# Patient Record
Sex: MALE | Race: OTHER | NOT HISPANIC OR LATINO | URBAN - METROPOLITAN AREA
[De-identification: names, ages, dates, MRNs, and addresses within clinical notes are randomized per-mention and may not be internally consistent; named-entity substitution may affect disease eponyms.]

---

## 2019-04-16 RX ORDER — CHLORHEXIDINE GLUCONATE 213 G/1000ML
1 SOLUTION TOPICAL ONCE
Qty: 0 | Refills: 0 | Status: DISCONTINUED | OUTPATIENT
Start: 2019-04-17 | End: 2019-05-02

## 2019-04-16 NOTE — H&P ADULT - HISTORY OF PRESENT ILLNESS
SDA Skeleton  *Confirm Medications on Arrival    50 y/o M with PMH of   who presented to cardiologist Dr. Zhong c/o…  Pt denies … CP, SOB, dizziness, diaphoresis, palpitations, fatigue, LE edema, orthopnea, PND, syncope.  Pt. had STRESS ECHO 4/15/19 which was terminated 2/2 exertional chest pain. Pt. only achieved 72 % of maximum predicted target HR which renders the stress load achieved inconclusive/non-diagnostic for illustration of possible presence of myocardial ischemia. Baseline ECHO revealed normal LV size; wall motion; systolic function with LVEF 60-65%.   Due to pts risk factors, CCS Angina Class _ Sx, abnormal STRESS ECHO , pt is referred for cardiac catheterization w/ possible intervention.

## 2019-04-17 ENCOUNTER — OUTPATIENT (OUTPATIENT)
Dept: OUTPATIENT SERVICES | Facility: HOSPITAL | Age: 49
LOS: 1 days | Discharge: HOME | End: 2019-04-17
Payer: COMMERCIAL

## 2019-04-17 ENCOUNTER — INPATIENT (INPATIENT)
Facility: HOSPITAL | Age: 49
LOS: 9 days | Discharge: HOME HEALTH | DRG: 233 | End: 2019-04-27
Attending: THORACIC SURGERY (CARDIOTHORACIC VASCULAR SURGERY) | Admitting: THORACIC SURGERY (CARDIOTHORACIC VASCULAR SURGERY)
Payer: COMMERCIAL

## 2019-04-17 VITALS
HEIGHT: 67 IN | TEMPERATURE: 98 F | OXYGEN SATURATION: 98 % | SYSTOLIC BLOOD PRESSURE: 118 MMHG | DIASTOLIC BLOOD PRESSURE: 76 MMHG | WEIGHT: 154.32 LBS | HEART RATE: 69 BPM | RESPIRATION RATE: 19 BRPM

## 2019-04-17 DIAGNOSIS — Z98.890 OTHER SPECIFIED POSTPROCEDURAL STATES: Chronic | ICD-10-CM

## 2019-04-17 DIAGNOSIS — I10 ESSENTIAL (PRIMARY) HYPERTENSION: ICD-10-CM

## 2019-04-17 DIAGNOSIS — D72.829 ELEVATED WHITE BLOOD CELL COUNT, UNSPECIFIED: ICD-10-CM

## 2019-04-17 DIAGNOSIS — E78.5 HYPERLIPIDEMIA, UNSPECIFIED: ICD-10-CM

## 2019-04-17 DIAGNOSIS — I20.0 UNSTABLE ANGINA: ICD-10-CM

## 2019-04-17 DIAGNOSIS — Z82.49 FAMILY HISTORY OF ISCHEMIC HEART DISEASE AND OTHER DISEASES OF THE CIRCULATORY SYSTEM: ICD-10-CM

## 2019-04-17 DIAGNOSIS — I25.110 ATHEROSCLEROTIC HEART DISEASE OF NATIVE CORONARY ARTERY WITH UNSTABLE ANGINA PECTORIS: ICD-10-CM

## 2019-04-17 PROBLEM — Z00.00 ENCOUNTER FOR PREVENTIVE HEALTH EXAMINATION: Status: ACTIVE | Noted: 2019-04-17

## 2019-04-17 LAB
ALBUMIN SERPL ELPH-MCNC: 4 G/DL — SIGNIFICANT CHANGE UP (ref 3.3–5)
ALP SERPL-CCNC: 88 U/L — SIGNIFICANT CHANGE UP (ref 40–120)
ALT FLD-CCNC: 17 U/L — SIGNIFICANT CHANGE UP (ref 10–45)
ANION GAP SERPL CALC-SCNC: 11 MMOL/L — SIGNIFICANT CHANGE UP (ref 5–17)
APTT BLD: 38.2 SEC — HIGH (ref 27.5–36.3)
AST SERPL-CCNC: 15 U/L — SIGNIFICANT CHANGE UP (ref 10–40)
BASOPHILS # BLD AUTO: 0.05 K/UL — SIGNIFICANT CHANGE UP (ref 0–0.2)
BASOPHILS NFR BLD AUTO: 0.5 % — SIGNIFICANT CHANGE UP (ref 0–2)
BILIRUB SERPL-MCNC: 0.2 MG/DL — SIGNIFICANT CHANGE UP (ref 0.2–1.2)
BUN SERPL-MCNC: 17 MG/DL — SIGNIFICANT CHANGE UP (ref 7–23)
CALCIUM SERPL-MCNC: 9.2 MG/DL — SIGNIFICANT CHANGE UP (ref 8.4–10.5)
CHLORIDE SERPL-SCNC: 104 MMOL/L — SIGNIFICANT CHANGE UP (ref 96–108)
CO2 SERPL-SCNC: 25 MMOL/L — SIGNIFICANT CHANGE UP (ref 22–31)
CREAT SERPL-MCNC: 0.91 MG/DL — SIGNIFICANT CHANGE UP (ref 0.5–1.3)
EOSINOPHIL # BLD AUTO: 0.29 K/UL — SIGNIFICANT CHANGE UP (ref 0–0.5)
EOSINOPHIL NFR BLD AUTO: 2.6 % — SIGNIFICANT CHANGE UP (ref 0–6)
GLUCOSE SERPL-MCNC: 99 MG/DL — SIGNIFICANT CHANGE UP (ref 70–99)
HCT VFR BLD CALC: 46.1 % — SIGNIFICANT CHANGE UP (ref 39–50)
HGB BLD-MCNC: 14.5 G/DL — SIGNIFICANT CHANGE UP (ref 13–17)
IMM GRANULOCYTES NFR BLD AUTO: 0.4 % — SIGNIFICANT CHANGE UP (ref 0–1.5)
INR BLD: 1.09 — SIGNIFICANT CHANGE UP (ref 0.88–1.16)
LYMPHOCYTES # BLD AUTO: 4.46 K/UL — HIGH (ref 1–3.3)
LYMPHOCYTES # BLD AUTO: 40.7 % — SIGNIFICANT CHANGE UP (ref 13–44)
MCHC RBC-ENTMCNC: 27.5 PG — SIGNIFICANT CHANGE UP (ref 27–34)
MCHC RBC-ENTMCNC: 31.5 GM/DL — LOW (ref 32–36)
MCV RBC AUTO: 87.5 FL — SIGNIFICANT CHANGE UP (ref 80–100)
MONOCYTES # BLD AUTO: 1.12 K/UL — HIGH (ref 0–0.9)
MONOCYTES NFR BLD AUTO: 10.2 % — SIGNIFICANT CHANGE UP (ref 2–14)
NEUTROPHILS # BLD AUTO: 4.99 K/UL — SIGNIFICANT CHANGE UP (ref 1.8–7.4)
NEUTROPHILS NFR BLD AUTO: 45.6 % — SIGNIFICANT CHANGE UP (ref 43–77)
NRBC # BLD: 0 /100 WBCS — SIGNIFICANT CHANGE UP (ref 0–0)
PLATELET # BLD AUTO: 372 K/UL — SIGNIFICANT CHANGE UP (ref 150–400)
POTASSIUM SERPL-MCNC: 4 MMOL/L — SIGNIFICANT CHANGE UP (ref 3.5–5.3)
POTASSIUM SERPL-SCNC: 4 MMOL/L — SIGNIFICANT CHANGE UP (ref 3.5–5.3)
PROT SERPL-MCNC: 7.6 G/DL — SIGNIFICANT CHANGE UP (ref 6–8.3)
PROTHROM AB SERPL-ACNC: 12.4 SEC — SIGNIFICANT CHANGE UP (ref 10–12.9)
RBC # BLD: 5.27 M/UL — SIGNIFICANT CHANGE UP (ref 4.2–5.8)
RBC # FLD: 15.2 % — HIGH (ref 10.3–14.5)
SODIUM SERPL-SCNC: 140 MMOL/L — SIGNIFICANT CHANGE UP (ref 135–145)
TROPONIN T SERPL-MCNC: <0.01 NG/ML — SIGNIFICANT CHANGE UP (ref 0–0.01)
WBC # BLD: 10.95 K/UL — HIGH (ref 3.8–10.5)
WBC # FLD AUTO: 10.95 K/UL — HIGH (ref 3.8–10.5)

## 2019-04-17 PROCEDURE — 99223 1ST HOSP IP/OBS HIGH 75: CPT

## 2019-04-17 PROCEDURE — 94010 BREATHING CAPACITY TEST: CPT | Mod: 26

## 2019-04-17 PROCEDURE — 71045 X-RAY EXAM CHEST 1 VIEW: CPT | Mod: 26

## 2019-04-17 PROCEDURE — 93880 EXTRACRANIAL BILAT STUDY: CPT | Mod: 26

## 2019-04-17 PROCEDURE — 99285 EMERGENCY DEPT VISIT HI MDM: CPT

## 2019-04-17 PROCEDURE — 93458 L HRT ARTERY/VENTRICLE ANGIO: CPT

## 2019-04-17 RX ORDER — ACETAMINOPHEN 500 MG
1000 TABLET ORAL ONCE
Qty: 0 | Refills: 0 | Status: COMPLETED | OUTPATIENT
Start: 2019-04-17 | End: 2019-04-17

## 2019-04-17 RX ORDER — ATORVASTATIN CALCIUM 80 MG/1
80 TABLET, FILM COATED ORAL AT BEDTIME
Qty: 0 | Refills: 0 | Status: DISCONTINUED | OUTPATIENT
Start: 2019-04-17 | End: 2019-04-19

## 2019-04-17 RX ORDER — HEPARIN SODIUM 5000 [USP'U]/ML
10 INJECTION INTRAVENOUS; SUBCUTANEOUS
Qty: 25000 | Refills: 0 | Status: DISCONTINUED | OUTPATIENT
Start: 2019-04-17 | End: 2019-04-17

## 2019-04-17 RX ORDER — ISOSORBIDE MONONITRATE 60 MG/1
30 TABLET, EXTENDED RELEASE ORAL DAILY
Qty: 0 | Refills: 0 | Status: DISCONTINUED | OUTPATIENT
Start: 2019-04-17 | End: 2019-04-19

## 2019-04-17 RX ORDER — ASPIRIN/CALCIUM CARB/MAGNESIUM 324 MG
325 TABLET ORAL ONCE
Qty: 0 | Refills: 0 | Status: COMPLETED | OUTPATIENT
Start: 2019-04-17 | End: 2019-04-17

## 2019-04-17 RX ORDER — MORPHINE SULFATE 50 MG/1
4 CAPSULE, EXTENDED RELEASE ORAL ONCE
Qty: 0 | Refills: 0 | Status: DISCONTINUED | OUTPATIENT
Start: 2019-04-17 | End: 2019-04-17

## 2019-04-17 RX ORDER — NICOTINE POLACRILEX 2 MG
1 GUM BUCCAL DAILY
Qty: 0 | Refills: 0 | Status: DISCONTINUED | OUTPATIENT
Start: 2019-04-17 | End: 2019-04-19

## 2019-04-17 RX ORDER — ATORVASTATIN CALCIUM 80 MG/1
40 TABLET, FILM COATED ORAL AT BEDTIME
Qty: 0 | Refills: 0 | Status: DISCONTINUED | OUTPATIENT
Start: 2019-04-17 | End: 2019-04-17

## 2019-04-17 RX ORDER — METOPROLOL TARTRATE 50 MG
12.5 TABLET ORAL EVERY 12 HOURS
Qty: 0 | Refills: 0 | Status: DISCONTINUED | OUTPATIENT
Start: 2019-04-17 | End: 2019-04-17

## 2019-04-17 RX ORDER — HEPARIN SODIUM 5000 [USP'U]/ML
1000 INJECTION INTRAVENOUS; SUBCUTANEOUS
Qty: 25000 | Refills: 0 | Status: DISCONTINUED | OUTPATIENT
Start: 2019-04-18 | End: 2019-04-19

## 2019-04-17 RX ORDER — ASPIRIN/CALCIUM CARB/MAGNESIUM 324 MG
81 TABLET ORAL DAILY
Qty: 0 | Refills: 0 | Status: DISCONTINUED | OUTPATIENT
Start: 2019-04-18 | End: 2019-04-19

## 2019-04-17 RX ORDER — SODIUM CHLORIDE 9 MG/ML
500 INJECTION INTRAMUSCULAR; INTRAVENOUS; SUBCUTANEOUS
Qty: 0 | Refills: 0 | Status: DISCONTINUED | OUTPATIENT
Start: 2019-04-17 | End: 2019-04-19

## 2019-04-17 RX ORDER — PANTOPRAZOLE SODIUM 20 MG/1
40 TABLET, DELAYED RELEASE ORAL
Qty: 0 | Refills: 0 | Status: DISCONTINUED | OUTPATIENT
Start: 2019-04-17 | End: 2019-04-19

## 2019-04-17 RX ORDER — SODIUM CHLORIDE 9 MG/ML
500 INJECTION INTRAMUSCULAR; INTRAVENOUS; SUBCUTANEOUS
Qty: 0 | Refills: 0 | Status: DISCONTINUED | OUTPATIENT
Start: 2019-04-17 | End: 2019-04-17

## 2019-04-17 RX ORDER — NITROGLYCERIN 6.5 MG
0.4 CAPSULE, EXTENDED RELEASE ORAL
Qty: 0 | Refills: 0 | Status: DISCONTINUED | OUTPATIENT
Start: 2019-04-17 | End: 2019-04-17

## 2019-04-17 RX ORDER — MORPHINE SULFATE 50 MG/1
2 CAPSULE, EXTENDED RELEASE ORAL EVERY 4 HOURS
Qty: 0 | Refills: 0 | Status: DISCONTINUED | OUTPATIENT
Start: 2019-04-17 | End: 2019-04-19

## 2019-04-17 RX ORDER — CLOPIDOGREL BISULFATE 75 MG/1
600 TABLET, FILM COATED ORAL ONCE
Qty: 0 | Refills: 0 | Status: COMPLETED | OUTPATIENT
Start: 2019-04-17 | End: 2019-04-17

## 2019-04-17 RX ORDER — NITROGLYCERIN 6.5 MG
0.4 CAPSULE, EXTENDED RELEASE ORAL
Qty: 0 | Refills: 0 | Status: DISCONTINUED | OUTPATIENT
Start: 2019-04-17 | End: 2019-04-19

## 2019-04-17 RX ORDER — HEPARIN SODIUM 5000 [USP'U]/ML
1000 INJECTION INTRAVENOUS; SUBCUTANEOUS
Qty: 25000 | Refills: 0 | Status: DISCONTINUED | OUTPATIENT
Start: 2019-04-17 | End: 2019-04-17

## 2019-04-17 RX ORDER — METOPROLOL TARTRATE 50 MG
12.5 TABLET ORAL EVERY 12 HOURS
Qty: 0 | Refills: 0 | Status: DISCONTINUED | OUTPATIENT
Start: 2019-04-17 | End: 2019-04-19

## 2019-04-17 RX ADMIN — ATORVASTATIN CALCIUM 80 MILLIGRAM(S): 80 TABLET, FILM COATED ORAL at 23:07

## 2019-04-17 RX ADMIN — CLOPIDOGREL BISULFATE 600 MILLIGRAM(S): 75 TABLET, FILM COATED ORAL at 08:50

## 2019-04-17 RX ADMIN — Medication 400 MILLIGRAM(S): at 16:02

## 2019-04-17 RX ADMIN — Medication 0.4 MILLIGRAM(S): at 08:51

## 2019-04-17 RX ADMIN — MORPHINE SULFATE 4 MILLIGRAM(S): 50 CAPSULE, EXTENDED RELEASE ORAL at 10:08

## 2019-04-17 RX ADMIN — Medication 325 MILLIGRAM(S): at 08:50

## 2019-04-17 NOTE — H&P ADULT - NSHPLABSRESULTS_GEN_ALL_CORE
14.5   10.95 )-----------( 372      ( 17 Apr 2019 08:30 )             46.1       04-17    140  |  104  |  17  ----------------------------<  99  4.0   |  25  |  0.91    Ca    9.2      17 Apr 2019 08:30    TPro  7.6  /  Alb  4.0  /  TBili  0.2  /  DBili  x   /  AST  15  /  ALT  17  /  AlkPhos  88  04-17      PT/INR - ( 17 Apr 2019 08:30 )   PT: 12.4 sec;   INR: 1.09          PTT - ( 17 Apr 2019 08:30 )  PTT:38.2 sec    CARDIAC MARKERS ( 17 Apr 2019 08:30 )  x     / <0.01 ng/mL / x     / x     / x

## 2019-04-17 NOTE — H&P ADULT - NSHPPHYSICALEXAM_GEN_ALL_CORE
V/S 	BP: [id number] 	  HR: 60s     RR: 16   T: 97   O2: 98% RA   	  GEN: NAD  PULM:  CTA B/L, No Crackles, Wheezes or Rhonchi  CARD: No JVD B/L, RRR, S1 and S2   ABD: +BS, NT, soft/ND	  EXT: No Edema B/L LE  NEURO: A+Ox3, CN 2-12 intact, motor strength 5/5 in B/L UE and LE, sensation intact in all extremities   PULSES: 2+ Radial b/l, 1-2+ Femoral b/l (no bruit b/l), DP 2+ b/l, PT 1-2+ B/L  ASA III, Mallampati III
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
## 2019-04-17 NOTE — ED ADULT TRIAGE NOTE - CHIEF COMPLAINT QUOTE
Pt states per translation line ID #[id number] "he was scheduled for a cardiac catheterization this morning but his insurance didn't approve it. He has Chest Pain right now". RN in cath lab confirmed pt statement

## 2019-04-17 NOTE — ED ADULT NURSE NOTE - OBJECTIVE STATEMENT
50 y/o male presents to the ED c/o 10/10 midsternal CP that began this AM associated w/ dizziness. Pt was scheduled for cardiac cath today for +stress test. Denies SOB, headache, back pain, nausea, vomiting, and any other sx.

## 2019-04-17 NOTE — H&P ADULT - PROBLEM SELECTOR PLAN 2
Afebrile. No infiltrate on CXR per verbal radiology read. No infectious symptoms.  DVT PPX: Intermittent Pneumatic Compression B/L LE     Case discussed w/ Dr. Zhong    Dispo: for cardiac cath. Afebrile. No infiltrate on CXR per verbal radiology read. No infectious symptoms.    DVT PPX: Intermittent Pneumatic Compression B/L LE     Case discussed w/ Dr. Zhong    Dispo: for cardiac cath.

## 2019-04-17 NOTE — ED PROVIDER NOTE - CARDIOVASCULAR [+], MLM
Controlled Substance Refill Request for Norco 5-325mg    Last refill: 12/1/17 - 60qty    Last clinic visit: 12/21/17     Next appt: None with [facility] provider    Controlled substance agreement on file: No.    Documentation in problem list reviewed:  Yes    Processing:  Mail to pended pharmacy   CHEST PAIN
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
## 2019-04-17 NOTE — ED PROVIDER NOTE - CLINICAL SUMMARY MEDICAL DECISION MAKING FREE TEXT BOX
48 yo M, unstable angina, given aspirin, plavix, nitro in the ED. Patient discussed with Dr. Zhong. Admit to cardiology prior to labs returning for cath.

## 2019-04-17 NOTE — ED ADULT NURSE NOTE - CHIEF COMPLAINT QUOTE
Pt states per translation line ID #[id number] "he was scheduled for a cardiac catheterization this morning but his insurance didn't approve it. He has Chest Pain right now". RN in cath lab confirmed pt statement

## 2019-04-17 NOTE — H&P ADULT - PROBLEM SELECTOR PLAN 1
ASA/Plavix load given in ED  Consented for cath.  Plan for cath. now  Risks & benefits of procedure and alternative therapy have been explained to the patient including but not limited to: allergic reaction, bleeding w/possible need for blood transfusion, infection, renal and vascular compromise, limb damage, arrhythmia, stroke, vessel dissection/perforation, Myocardial infarction, emergent CABG. Informed consent obtained and in chart.     TSH/A1C/Lipid Panel  NS @ 50cc/hr pre-cath  BP similar in B/L UE. R /82, L 126/78. No radial-femoral pulse delay B/L.

## 2019-04-17 NOTE — H&P ADULT - HISTORY OF PRESENT ILLNESS
History obtained from pt via translation of [employer]   ID [id number] and ID [id number].   50 y/o Portuguese Speaking M (who came from [place] 1 month ago, now living in [place]), On No Current Meds at Home, w/ FMHX CAD/MI, Ex-Marijuana User (Quit 15 yrs ago) w/ PMHX Prior Diagnostic Cardiac Cath. / presumed Non-Obstructive CAD in [place], who reports C/P x 3 years, known to Dr. Zhong w/ recent abnormal Stress Echo 4/15/19 revealing, terminated due to C/P, 72 % of maximum predicted target HR which renders the stress load achieved inconclusive/non-diagnostic for illustration of possible presence of myocardial ischemia LVEF 60-65%, who presented to [facility] ED today c/o progressive 10/10 L sided "sharp" C/P independent of rest/activity, w/ prior radiation to the back (not radiating today). Associated symptoms inclide SOB, dizziness, diaphoresis, nausea, fatigue / general weakness. Pt denies vomiting, orthopnea/PND, LE edema, syncope. In ED /76, HR 60s, RR 16, T 97, O2 98% RA. Labs significant for Troponin negative, WBC 10.9. EKG showed NSR @ 73bpm, w/ incomplete RBBB, j point elevation in V2-V4, I, AVL, TWI and ST depression up to 0.5mmg in II, III, AVF. ASA 325mg and Plavix 600mg given. Pt received SL NTG x 3 without relief. C/P noted to be worse with palpation of chest wall. Repeat EKG done, both EKGs reviewed with Dr. Zhong, additional j point elevation noted in V1, otherwise no change. Pt then received Morphine 4mg IV, currently comfortable at rest and on ambulation with no reported symptoms. CXR clear per verbal radiology read. Denies fever/chills, cough, abdominal pain, and diarrhea.    Due to pts risk factors, unable angina and non-conclusive stress echo pt is referred for cardiac catheterization w/ possible intervention History obtained from pt via translation of [employer]   ID [id number] and ID [id number].   48 y/o Spanish Speaking M (who came from [place] 1 month ago, now living in [place]), On No Current Meds at Home, w/ FMHX CAD/MI, Ex-Marijuana User (Quit 15 yrs ago) w/ PMHX Prior Diagnostic Cardiac Cath. / presumed Non-Obstructive CAD in [place], who reports C/P x 3 years, known to Dr. Zhong w/ recent abnormal Stress Echo 4/15/19 revealing, terminated due to C/P, 72 % of maximum predicted target HR which renders the stress load achieved inconclusive/non-diagnostic for illustration of possible presence of myocardial ischemia LVEF 60-65%, patient was initially scheduled for ambulatory cath today however due to unstable angina presented to [facility] ED today. Pt c/o progressive 10/10 L sided "sharp" C/P independent of rest/activity, w/ prior radiation to the back (not radiating today). Associated symptoms include SOB, dizziness, diaphoresis, nausea, fatigue / general weakness. Pt denies vomiting, orthopnea/PND, LE edema, syncope. In ED /76, HR 60s, RR 16, T 97, O2 98% RA. Labs significant for Troponin negative, WBC 10.9. EKG showed NSR @ 73bpm, w/ incomplete RBBB, j point elevation in V2-V4, I, AVL, TWI and ST depression up to 0.5mmg in II, III, AVF. ASA 325mg and Plavix 600mg given. Pt received SL NTG x 3 without relief. C/P noted to be worse with palpation of chest wall. Repeat EKG done, both EKGs reviewed with Dr. Zhong, additional j point elevation noted in V1, otherwise no change. Pt then received Morphine 4mg IV, currently comfortable at rest and on ambulation with no reported symptoms. CXR clear per verbal radiology read. Denies fever/chills, cough, abdominal pain, and diarrhea.    Due to pts risk factors, unable angina and non-conclusive stress echo pt is referred for cardiac catheterization w/ possible intervention

## 2019-04-17 NOTE — ED PROVIDER NOTE - OBJECTIVE STATEMENT
50 yo M, h/o tobacco use, sent in by cardiology for cath. Patient with positive stress test, supposed to have cath scheduled but issues scheduling it. Patient with 10/10 chest pain, burning, substernal. Discussed with Dr. Zhong, patients cardiologist who would like the patient admitted for cardiac cath today.

## 2019-04-17 NOTE — H&P ADULT - ASSESSMENT
50 y/o Kiswahili Speaking M, w/ FMHX CAD/MI, Ex-Marijuana User, w/ PMHX Prior Diagnostic Cardiac Cath. / presumed Non-Obstructive CAD in [place], who presents w/ unstable angina.   Due to pts risk factors, unable angina and non-conclusive stress echo pt is referred for cardiac catheterization w/ possible intervention

## 2019-04-17 NOTE — CHART NOTE - NSCHARTNOTEFT_GEN_A_CORE
Currently VSS. No reported symptoms. R radial access site stable, radial band in place, no hematoma/bleeding, 1+ radial pulse.   s/p cardiac cath 4/17/19: severe 4vCAD, 100% RCA, 95% Ramus, 80% pLCX, 100% dLCX (thrombotic), 75% pLAD, 80% D1, EF 45%, EDP 22  CTS consult (Dr. Goldberg). Pre-op orders.  Started ASA 81mg daily, Lipitor 40 QHS, Lopressor 12.5 BID  No Heparin gtt at this time as per Dr. Zhong post cath., currently C/P free.   Case discussed w/ Dr. Zhong Currently VSS. No reported symptoms. R radial access site stable, radial band in place, no hematoma/bleeding, 1+ radial pulse.   s/p cardiac cath 4/17/19: severe 4vCAD, 100% RCA, 95% Ramus, 80% pLCX, 100% dLCX (thrombotic), 75% pLAD, 80% D1, EF 45%, EDP 22  CTS consult (Dr. Goldberg). Pre-op orders.  Started ASA 81mg daily, Lipitor 80 QHS, Lopressor 12.5 BID  No Heparin gtt at this time as per Dr. Zhong post cath., currently C/P free.   Case discussed w/ Dr. Zhong

## 2019-04-18 LAB
ALBUMIN SERPL ELPH-MCNC: 4.1 G/DL — SIGNIFICANT CHANGE UP (ref 3.3–5)
ALP SERPL-CCNC: 87 U/L — SIGNIFICANT CHANGE UP (ref 40–120)
ALT FLD-CCNC: 17 U/L — SIGNIFICANT CHANGE UP (ref 10–45)
ANION GAP SERPL CALC-SCNC: 12 MMOL/L — SIGNIFICANT CHANGE UP (ref 5–17)
APPEARANCE UR: ABNORMAL
APTT BLD: 35.9 SEC — SIGNIFICANT CHANGE UP (ref 27.5–36.3)
APTT BLD: 40.8 SEC — HIGH (ref 27.5–36.3)
APTT BLD: 47.2 SEC — HIGH (ref 27.5–36.3)
AST SERPL-CCNC: 16 U/L — SIGNIFICANT CHANGE UP (ref 10–40)
BASE EXCESS BLDA CALC-SCNC: -0.4 MMOL/L — SIGNIFICANT CHANGE UP (ref -2–3)
BILIRUB SERPL-MCNC: 0.3 MG/DL — SIGNIFICANT CHANGE UP (ref 0.2–1.2)
BILIRUB UR-MCNC: NEGATIVE — SIGNIFICANT CHANGE UP
BLD GP AB SCN SERPL QL: NEGATIVE — SIGNIFICANT CHANGE UP
BLD GP AB SCN SERPL QL: NEGATIVE — SIGNIFICANT CHANGE UP
BUN SERPL-MCNC: 13 MG/DL — SIGNIFICANT CHANGE UP (ref 7–23)
CALCIUM SERPL-MCNC: 9.4 MG/DL — SIGNIFICANT CHANGE UP (ref 8.4–10.5)
CHLORIDE SERPL-SCNC: 105 MMOL/L — SIGNIFICANT CHANGE UP (ref 96–108)
CO2 SERPL-SCNC: 24 MMOL/L — SIGNIFICANT CHANGE UP (ref 22–31)
COLOR SPEC: YELLOW — SIGNIFICANT CHANGE UP
CREAT SERPL-MCNC: 0.76 MG/DL — SIGNIFICANT CHANGE UP (ref 0.5–1.3)
DIFF PNL FLD: ABNORMAL
GLUCOSE SERPL-MCNC: 103 MG/DL — HIGH (ref 70–99)
GLUCOSE UR QL: NEGATIVE — SIGNIFICANT CHANGE UP
HCO3 BLDA-SCNC: 23 MMOL/L — SIGNIFICANT CHANGE UP (ref 21–28)
HCT VFR BLD CALC: 47 % — SIGNIFICANT CHANGE UP (ref 39–50)
HGB BLD-MCNC: 14.6 G/DL — SIGNIFICANT CHANGE UP (ref 13–17)
INR BLD: 1.09 — SIGNIFICANT CHANGE UP (ref 0.88–1.16)
KETONES UR-MCNC: NEGATIVE — SIGNIFICANT CHANGE UP
LEUKOCYTE ESTERASE UR-ACNC: NEGATIVE — SIGNIFICANT CHANGE UP
MAGNESIUM SERPL-MCNC: 2 MG/DL — SIGNIFICANT CHANGE UP (ref 1.6–2.6)
MCHC RBC-ENTMCNC: 27.1 PG — SIGNIFICANT CHANGE UP (ref 27–34)
MCHC RBC-ENTMCNC: 31.1 GM/DL — LOW (ref 32–36)
MCV RBC AUTO: 87.2 FL — SIGNIFICANT CHANGE UP (ref 80–100)
NITRITE UR-MCNC: NEGATIVE — SIGNIFICANT CHANGE UP
NRBC # BLD: 0 /100 WBCS — SIGNIFICANT CHANGE UP (ref 0–0)
NT-PROBNP SERPL-SCNC: 230 PG/ML — SIGNIFICANT CHANGE UP (ref 0–300)
PCO2 BLDA: 35 MMHG — SIGNIFICANT CHANGE UP (ref 35–48)
PH BLDA: 7.44 — SIGNIFICANT CHANGE UP (ref 7.35–7.45)
PH UR: 6 — SIGNIFICANT CHANGE UP (ref 5–8)
PLATELET # BLD AUTO: 368 K/UL — SIGNIFICANT CHANGE UP (ref 150–400)
PO2 BLDA: 86 MMHG — SIGNIFICANT CHANGE UP (ref 83–108)
POTASSIUM SERPL-MCNC: 4.2 MMOL/L — SIGNIFICANT CHANGE UP (ref 3.5–5.3)
POTASSIUM SERPL-SCNC: 4.2 MMOL/L — SIGNIFICANT CHANGE UP (ref 3.5–5.3)
PROT SERPL-MCNC: 7.5 G/DL — SIGNIFICANT CHANGE UP (ref 6–8.3)
PROT UR-MCNC: NEGATIVE MG/DL — SIGNIFICANT CHANGE UP
PROTHROM AB SERPL-ACNC: 12.3 SEC — SIGNIFICANT CHANGE UP (ref 10–12.9)
RBC # BLD: 5.39 M/UL — SIGNIFICANT CHANGE UP (ref 4.2–5.8)
RBC # FLD: 15.4 % — HIGH (ref 10.3–14.5)
RH IG SCN BLD-IMP: POSITIVE — SIGNIFICANT CHANGE UP
RH IG SCN BLD-IMP: POSITIVE — SIGNIFICANT CHANGE UP
SAO2 % BLDA: 97 % — SIGNIFICANT CHANGE UP (ref 95–100)
SODIUM SERPL-SCNC: 141 MMOL/L — SIGNIFICANT CHANGE UP (ref 135–145)
SP GR SPEC: 1.02 — SIGNIFICANT CHANGE UP (ref 1–1.03)
UROBILINOGEN FLD QL: 0.2 E.U./DL — SIGNIFICANT CHANGE UP
WBC # BLD: 9.04 K/UL — SIGNIFICANT CHANGE UP (ref 3.8–10.5)
WBC # FLD AUTO: 9.04 K/UL — SIGNIFICANT CHANGE UP (ref 3.8–10.5)

## 2019-04-18 PROCEDURE — 93306 TTE W/DOPPLER COMPLETE: CPT | Mod: 26

## 2019-04-18 PROCEDURE — 71250 CT THORAX DX C-: CPT | Mod: 26

## 2019-04-18 PROCEDURE — 93010 ELECTROCARDIOGRAM REPORT: CPT

## 2019-04-18 PROCEDURE — 99231 SBSQ HOSP IP/OBS SF/LOW 25: CPT | Mod: 57

## 2019-04-18 RX ORDER — INFLUENZA VIRUS VACCINE 15; 15; 15; 15 UG/.5ML; UG/.5ML; UG/.5ML; UG/.5ML
0.5 SUSPENSION INTRAMUSCULAR ONCE
Qty: 0 | Refills: 0 | Status: DISCONTINUED | OUTPATIENT
Start: 2019-04-18 | End: 2019-04-19

## 2019-04-18 RX ORDER — CHLORHEXIDINE GLUCONATE 213 G/1000ML
1 SOLUTION TOPICAL ONCE
Qty: 0 | Refills: 0 | Status: COMPLETED | OUTPATIENT
Start: 2019-04-19 | End: 2019-04-19

## 2019-04-18 RX ORDER — CHLORHEXIDINE GLUCONATE 213 G/1000ML
1 SOLUTION TOPICAL ONCE
Qty: 0 | Refills: 0 | Status: COMPLETED | OUTPATIENT
Start: 2019-04-18 | End: 2019-04-18

## 2019-04-18 RX ORDER — CHLORHEXIDINE GLUCONATE 213 G/1000ML
5 SOLUTION TOPICAL ONCE
Qty: 0 | Refills: 0 | Status: COMPLETED | OUTPATIENT
Start: 2019-04-19 | End: 2019-04-19

## 2019-04-18 RX ADMIN — ISOSORBIDE MONONITRATE 30 MILLIGRAM(S): 60 TABLET, EXTENDED RELEASE ORAL at 11:20

## 2019-04-18 RX ADMIN — Medication 12.5 MILLIGRAM(S): at 17:57

## 2019-04-18 RX ADMIN — PANTOPRAZOLE SODIUM 40 MILLIGRAM(S): 20 TABLET, DELAYED RELEASE ORAL at 06:49

## 2019-04-18 RX ADMIN — ATORVASTATIN CALCIUM 80 MILLIGRAM(S): 80 TABLET, FILM COATED ORAL at 22:10

## 2019-04-18 RX ADMIN — Medication 1 PATCH: at 11:20

## 2019-04-18 RX ADMIN — Medication 12.5 MILLIGRAM(S): at 17:51

## 2019-04-18 RX ADMIN — HEPARIN SODIUM 10 UNIT(S)/HR: 5000 INJECTION INTRAVENOUS; SUBCUTANEOUS at 00:20

## 2019-04-18 RX ADMIN — CHLORHEXIDINE GLUCONATE 1 APPLICATION(S): 213 SOLUTION TOPICAL at 22:08

## 2019-04-18 RX ADMIN — Medication 1 PATCH: at 17:58

## 2019-04-18 RX ADMIN — Medication 81 MILLIGRAM(S): at 11:20

## 2019-04-18 RX ADMIN — Medication 12.5 MILLIGRAM(S): at 06:49

## 2019-04-18 RX ADMIN — HEPARIN SODIUM 12 UNIT(S)/HR: 5000 INJECTION INTRAVENOUS; SUBCUTANEOUS at 19:21
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
## 2019-04-19 ENCOUNTER — APPOINTMENT (OUTPATIENT)
Dept: CARDIOTHORACIC SURGERY | Facility: HOSPITAL | Age: 49
End: 2019-04-19
Payer: COMMERCIAL

## 2019-04-19 LAB
ALBUMIN SERPL ELPH-MCNC: 2.8 G/DL — LOW (ref 3.3–5)
ALBUMIN SERPL ELPH-MCNC: 3.4 G/DL — SIGNIFICANT CHANGE UP (ref 3.3–5)
ALBUMIN SERPL ELPH-MCNC: 3.8 G/DL — SIGNIFICANT CHANGE UP (ref 3.3–5)
ALP SERPL-CCNC: 49 U/L — SIGNIFICANT CHANGE UP (ref 40–120)
ALP SERPL-CCNC: 55 U/L — SIGNIFICANT CHANGE UP (ref 40–120)
ALP SERPL-CCNC: 65 U/L — SIGNIFICANT CHANGE UP (ref 40–120)
ALT FLD-CCNC: 17 U/L — SIGNIFICANT CHANGE UP (ref 10–45)
ALT FLD-CCNC: 18 U/L — SIGNIFICANT CHANGE UP (ref 10–45)
ALT FLD-CCNC: 20 U/L — SIGNIFICANT CHANGE UP (ref 10–45)
ANION GAP SERPL CALC-SCNC: 11 MMOL/L — SIGNIFICANT CHANGE UP (ref 5–17)
ANION GAP SERPL CALC-SCNC: 12 MMOL/L — SIGNIFICANT CHANGE UP (ref 5–17)
ANION GAP SERPL CALC-SCNC: 13 MMOL/L — SIGNIFICANT CHANGE UP (ref 5–17)
ANION GAP SERPL CALC-SCNC: 13 MMOL/L — SIGNIFICANT CHANGE UP (ref 5–17)
APTT BLD: 29 SEC — SIGNIFICANT CHANGE UP (ref 27.5–36.3)
APTT BLD: 30.3 SEC — SIGNIFICANT CHANGE UP (ref 27.5–36.3)
APTT BLD: 31.8 SEC — SIGNIFICANT CHANGE UP (ref 27.5–36.3)
APTT BLD: 76.3 SEC — HIGH (ref 27.5–36.3)
AST SERPL-CCNC: 53 U/L — HIGH (ref 10–40)
AST SERPL-CCNC: 61 U/L — HIGH (ref 10–40)
AST SERPL-CCNC: 61 U/L — HIGH (ref 10–40)
BASE EXCESS BLDA CALC-SCNC: -1.2 MMOL/L — SIGNIFICANT CHANGE UP (ref -2–3)
BASOPHILS # BLD AUTO: 0 K/UL — SIGNIFICANT CHANGE UP (ref 0–0.2)
BASOPHILS NFR BLD AUTO: 0 % — SIGNIFICANT CHANGE UP (ref 0–2)
BILIRUB SERPL-MCNC: 0.8 MG/DL — SIGNIFICANT CHANGE UP (ref 0.2–1.2)
BILIRUB SERPL-MCNC: 0.9 MG/DL — SIGNIFICANT CHANGE UP (ref 0.2–1.2)
BILIRUB SERPL-MCNC: 1.3 MG/DL — HIGH (ref 0.2–1.2)
BUN SERPL-MCNC: 10 MG/DL — SIGNIFICANT CHANGE UP (ref 7–23)
BUN SERPL-MCNC: 11 MG/DL — SIGNIFICANT CHANGE UP (ref 7–23)
BUN SERPL-MCNC: 9 MG/DL — SIGNIFICANT CHANGE UP (ref 7–23)
BUN SERPL-MCNC: 9 MG/DL — SIGNIFICANT CHANGE UP (ref 7–23)
CALCIUM SERPL-MCNC: 8.4 MG/DL — SIGNIFICANT CHANGE UP (ref 8.4–10.5)
CALCIUM SERPL-MCNC: 8.4 MG/DL — SIGNIFICANT CHANGE UP (ref 8.4–10.5)
CALCIUM SERPL-MCNC: 9.3 MG/DL — SIGNIFICANT CHANGE UP (ref 8.4–10.5)
CALCIUM SERPL-MCNC: 9.4 MG/DL — SIGNIFICANT CHANGE UP (ref 8.4–10.5)
CHLORIDE SERPL-SCNC: 102 MMOL/L — SIGNIFICANT CHANGE UP (ref 96–108)
CHLORIDE SERPL-SCNC: 107 MMOL/L — SIGNIFICANT CHANGE UP (ref 96–108)
CHLORIDE SERPL-SCNC: 107 MMOL/L — SIGNIFICANT CHANGE UP (ref 96–108)
CHLORIDE SERPL-SCNC: 108 MMOL/L — SIGNIFICANT CHANGE UP (ref 96–108)
CO2 SERPL-SCNC: 24 MMOL/L — SIGNIFICANT CHANGE UP (ref 22–31)
CO2 SERPL-SCNC: 25 MMOL/L — SIGNIFICANT CHANGE UP (ref 22–31)
CO2 SERPL-SCNC: 26 MMOL/L — SIGNIFICANT CHANGE UP (ref 22–31)
CO2 SERPL-SCNC: 28 MMOL/L — SIGNIFICANT CHANGE UP (ref 22–31)
CREAT SERPL-MCNC: 0.75 MG/DL — SIGNIFICANT CHANGE UP (ref 0.5–1.3)
CREAT SERPL-MCNC: 0.77 MG/DL — SIGNIFICANT CHANGE UP (ref 0.5–1.3)
CREAT SERPL-MCNC: 0.78 MG/DL — SIGNIFICANT CHANGE UP (ref 0.5–1.3)
CREAT SERPL-MCNC: 0.83 MG/DL — SIGNIFICANT CHANGE UP (ref 0.5–1.3)
EOSINOPHIL # BLD AUTO: 0.52 K/UL — HIGH (ref 0–0.5)
EOSINOPHIL NFR BLD AUTO: 1.7 % — SIGNIFICANT CHANGE UP (ref 0–6)
GAS PNL BLDA: SIGNIFICANT CHANGE UP
GLUCOSE BLDC GLUCOMTR-MCNC: 134 MG/DL — HIGH (ref 70–99)
GLUCOSE BLDC GLUCOMTR-MCNC: 135 MG/DL — HIGH (ref 70–99)
GLUCOSE BLDC GLUCOMTR-MCNC: 155 MG/DL — HIGH (ref 70–99)
GLUCOSE BLDC GLUCOMTR-MCNC: 82 MG/DL — SIGNIFICANT CHANGE UP (ref 70–99)
GLUCOSE SERPL-MCNC: 140 MG/DL — HIGH (ref 70–99)
GLUCOSE SERPL-MCNC: 148 MG/DL — HIGH (ref 70–99)
GLUCOSE SERPL-MCNC: 151 MG/DL — HIGH (ref 70–99)
GLUCOSE SERPL-MCNC: 83 MG/DL — SIGNIFICANT CHANGE UP (ref 70–99)
HCO3 BLDA-SCNC: 23 MMOL/L — SIGNIFICANT CHANGE UP (ref 21–28)
HCT VFR BLD CALC: 27.4 % — LOW (ref 39–50)
HCT VFR BLD CALC: 30.3 % — LOW (ref 39–50)
HCT VFR BLD CALC: 33.7 % — LOW (ref 39–50)
HCT VFR BLD CALC: 46.6 % — SIGNIFICANT CHANGE UP (ref 39–50)
HGB BLD-MCNC: 11.1 G/DL — LOW (ref 13–17)
HGB BLD-MCNC: 14.6 G/DL — SIGNIFICANT CHANGE UP (ref 13–17)
HGB BLD-MCNC: 8.9 G/DL — LOW (ref 13–17)
HGB BLD-MCNC: 9.9 G/DL — LOW (ref 13–17)
INR BLD: 1.14 — SIGNIFICANT CHANGE UP (ref 0.88–1.16)
INR BLD: 1.33 — HIGH (ref 0.88–1.16)
INR BLD: 1.38 — HIGH (ref 0.88–1.16)
INR BLD: 1.47 — HIGH (ref 0.88–1.16)
LACTATE SERPL-SCNC: 2.6 MMOL/L — HIGH (ref 0.5–2)
LACTATE SERPL-SCNC: 2.7 MMOL/L — HIGH (ref 0.5–2)
LACTATE SERPL-SCNC: 3.3 MMOL/L — HIGH (ref 0.5–2)
LYMPHOCYTES # BLD AUTO: 1.35 K/UL — SIGNIFICANT CHANGE UP (ref 1–3.3)
LYMPHOCYTES # BLD AUTO: 4.4 % — LOW (ref 13–44)
MAGNESIUM SERPL-MCNC: 2.2 MG/DL — SIGNIFICANT CHANGE UP (ref 1.6–2.6)
MAGNESIUM SERPL-MCNC: 2.3 MG/DL — SIGNIFICANT CHANGE UP (ref 1.6–2.6)
MAGNESIUM SERPL-MCNC: 2.3 MG/DL — SIGNIFICANT CHANGE UP (ref 1.6–2.6)
MCHC RBC-ENTMCNC: 26.9 PG — LOW (ref 27–34)
MCHC RBC-ENTMCNC: 27.8 PG — SIGNIFICANT CHANGE UP (ref 27–34)
MCHC RBC-ENTMCNC: 28.2 PG — SIGNIFICANT CHANGE UP (ref 27–34)
MCHC RBC-ENTMCNC: 28.2 PG — SIGNIFICANT CHANGE UP (ref 27–34)
MCHC RBC-ENTMCNC: 31.3 GM/DL — LOW (ref 32–36)
MCHC RBC-ENTMCNC: 32.5 GM/DL — SIGNIFICANT CHANGE UP (ref 32–36)
MCHC RBC-ENTMCNC: 32.7 GM/DL — SIGNIFICANT CHANGE UP (ref 32–36)
MCHC RBC-ENTMCNC: 32.9 GM/DL — SIGNIFICANT CHANGE UP (ref 32–36)
MCV RBC AUTO: 85.6 FL — SIGNIFICANT CHANGE UP (ref 80–100)
MCV RBC AUTO: 85.8 FL — SIGNIFICANT CHANGE UP (ref 80–100)
MCV RBC AUTO: 85.8 FL — SIGNIFICANT CHANGE UP (ref 80–100)
MCV RBC AUTO: 86.3 FL — SIGNIFICANT CHANGE UP (ref 80–100)
MONOCYTES # BLD AUTO: 0.8 K/UL — SIGNIFICANT CHANGE UP (ref 0–0.9)
MONOCYTES NFR BLD AUTO: 2.6 % — SIGNIFICANT CHANGE UP (ref 2–14)
NEUTROPHILS # BLD AUTO: 27.83 K/UL — HIGH (ref 1.8–7.4)
NEUTROPHILS NFR BLD AUTO: 62.3 % — SIGNIFICANT CHANGE UP (ref 43–77)
NRBC # BLD: 0 /100 WBCS — SIGNIFICANT CHANGE UP (ref 0–0)
PCO2 BLDA: 35 MMHG — SIGNIFICANT CHANGE UP (ref 35–48)
PH BLDA: 7.43 — SIGNIFICANT CHANGE UP (ref 7.35–7.45)
PHOSPHATE SERPL-MCNC: 4.1 MG/DL — SIGNIFICANT CHANGE UP (ref 2.5–4.5)
PLATELET # BLD AUTO: 248 K/UL — SIGNIFICANT CHANGE UP (ref 150–400)
PLATELET # BLD AUTO: 264 K/UL — SIGNIFICANT CHANGE UP (ref 150–400)
PLATELET # BLD AUTO: 270 K/UL — SIGNIFICANT CHANGE UP (ref 150–400)
PLATELET # BLD AUTO: 376 K/UL — SIGNIFICANT CHANGE UP (ref 150–400)
PO2 BLDA: 137 MMHG — HIGH (ref 83–108)
POTASSIUM SERPL-MCNC: 3.7 MMOL/L — SIGNIFICANT CHANGE UP (ref 3.5–5.3)
POTASSIUM SERPL-MCNC: 4.1 MMOL/L — SIGNIFICANT CHANGE UP (ref 3.5–5.3)
POTASSIUM SERPL-MCNC: 4.1 MMOL/L — SIGNIFICANT CHANGE UP (ref 3.5–5.3)
POTASSIUM SERPL-MCNC: 4.4 MMOL/L — SIGNIFICANT CHANGE UP (ref 3.5–5.3)
POTASSIUM SERPL-SCNC: 3.7 MMOL/L — SIGNIFICANT CHANGE UP (ref 3.5–5.3)
POTASSIUM SERPL-SCNC: 4.1 MMOL/L — SIGNIFICANT CHANGE UP (ref 3.5–5.3)
POTASSIUM SERPL-SCNC: 4.1 MMOL/L — SIGNIFICANT CHANGE UP (ref 3.5–5.3)
POTASSIUM SERPL-SCNC: 4.4 MMOL/L — SIGNIFICANT CHANGE UP (ref 3.5–5.3)
PROT SERPL-MCNC: 4.9 G/DL — LOW (ref 6–8.3)
PROT SERPL-MCNC: 5.3 G/DL — LOW (ref 6–8.3)
PROT SERPL-MCNC: 5.8 G/DL — LOW (ref 6–8.3)
PROTHROM AB SERPL-ACNC: 12.9 SEC — SIGNIFICANT CHANGE UP (ref 10–12.9)
PROTHROM AB SERPL-ACNC: 15.2 SEC — HIGH (ref 10–12.9)
PROTHROM AB SERPL-ACNC: 15.7 SEC — HIGH (ref 10–12.9)
PROTHROM AB SERPL-ACNC: 16.8 SEC — HIGH (ref 10–12.9)
RBC # BLD: 3.2 M/UL — LOW (ref 4.2–5.8)
RBC # BLD: 3.51 M/UL — LOW (ref 4.2–5.8)
RBC # BLD: 3.93 M/UL — LOW (ref 4.2–5.8)
RBC # BLD: 5.43 M/UL — SIGNIFICANT CHANGE UP (ref 4.2–5.8)
RBC # FLD: 14.6 % — HIGH (ref 10.3–14.5)
RBC # FLD: 14.8 % — HIGH (ref 10.3–14.5)
RBC # FLD: 14.9 % — HIGH (ref 10.3–14.5)
RBC # FLD: 14.9 % — HIGH (ref 10.3–14.5)
SAO2 % BLDA: 99 % — SIGNIFICANT CHANGE UP (ref 95–100)
SODIUM SERPL-SCNC: 138 MMOL/L — SIGNIFICANT CHANGE UP (ref 135–145)
SODIUM SERPL-SCNC: 146 MMOL/L — HIGH (ref 135–145)
WBC # BLD: 11.66 K/UL — HIGH (ref 3.8–10.5)
WBC # BLD: 17.98 K/UL — HIGH (ref 3.8–10.5)
WBC # BLD: 22.98 K/UL — HIGH (ref 3.8–10.5)
WBC # BLD: 30.79 K/UL — HIGH (ref 3.8–10.5)
WBC # FLD AUTO: 11.66 K/UL — HIGH (ref 3.8–10.5)
WBC # FLD AUTO: 17.98 K/UL — HIGH (ref 3.8–10.5)
WBC # FLD AUTO: 22.98 K/UL — HIGH (ref 3.8–10.5)
WBC # FLD AUTO: 30.79 K/UL — HIGH (ref 3.8–10.5)

## 2019-04-19 PROCEDURE — 33508 ENDOSCOPIC VEIN HARVEST: CPT | Mod: AS

## 2019-04-19 PROCEDURE — 99292 CRITICAL CARE ADDL 30 MIN: CPT

## 2019-04-19 PROCEDURE — 99291 CRITICAL CARE FIRST HOUR: CPT

## 2019-04-19 PROCEDURE — 33518 CABG ARTERY-VEIN TWO: CPT | Mod: AS

## 2019-04-19 PROCEDURE — 33534 CABG ARTERIAL TWO: CPT

## 2019-04-19 PROCEDURE — 33518 CABG ARTERY-VEIN TWO: CPT

## 2019-04-19 PROCEDURE — 33534 CABG ARTERIAL TWO: CPT | Mod: AS

## 2019-04-19 PROCEDURE — 71045 X-RAY EXAM CHEST 1 VIEW: CPT | Mod: 26

## 2019-04-19 PROCEDURE — 93010 ELECTROCARDIOGRAM REPORT: CPT

## 2019-04-19 RX ORDER — CHLORHEXIDINE GLUCONATE 213 G/1000ML
5 SOLUTION TOPICAL EVERY 4 HOURS
Qty: 0 | Refills: 0 | Status: DISCONTINUED | OUTPATIENT
Start: 2019-04-19 | End: 2019-04-20

## 2019-04-19 RX ORDER — DEXMEDETOMIDINE HYDROCHLORIDE IN 0.9% SODIUM CHLORIDE 4 UG/ML
0.3 INJECTION INTRAVENOUS
Qty: 200 | Refills: 0 | Status: DISCONTINUED | OUTPATIENT
Start: 2019-04-19 | End: 2019-04-20

## 2019-04-19 RX ORDER — INSULIN HUMAN 100 [IU]/ML
1 INJECTION, SOLUTION SUBCUTANEOUS
Qty: 100 | Refills: 0 | Status: DISCONTINUED | OUTPATIENT
Start: 2019-04-19 | End: 2019-04-20

## 2019-04-19 RX ORDER — DEXTROSE 50 % IN WATER 50 %
50 SYRINGE (ML) INTRAVENOUS
Qty: 0 | Refills: 0 | Status: DISCONTINUED | OUTPATIENT
Start: 2019-04-19 | End: 2019-04-26

## 2019-04-19 RX ORDER — CHLORHEXIDINE GLUCONATE 213 G/1000ML
1 SOLUTION TOPICAL DAILY
Qty: 0 | Refills: 0 | Status: DISCONTINUED | OUTPATIENT
Start: 2019-04-19 | End: 2019-04-26

## 2019-04-19 RX ORDER — FAMOTIDINE 10 MG/ML
20 INJECTION INTRAVENOUS EVERY 12 HOURS
Qty: 0 | Refills: 0 | Status: DISCONTINUED | OUTPATIENT
Start: 2019-04-19 | End: 2019-04-20

## 2019-04-19 RX ORDER — LEVALBUTEROL 1.25 MG/.5ML
0.63 SOLUTION, CONCENTRATE RESPIRATORY (INHALATION) EVERY 6 HOURS
Qty: 0 | Refills: 0 | Status: DISCONTINUED | OUTPATIENT
Start: 2019-04-19 | End: 2019-04-21

## 2019-04-19 RX ORDER — ALBUMIN HUMAN 25 %
250 VIAL (ML) INTRAVENOUS
Qty: 0 | Refills: 0 | Status: COMPLETED | OUTPATIENT
Start: 2019-04-19 | End: 2019-04-19

## 2019-04-19 RX ORDER — PHENYLEPHRINE HYDROCHLORIDE 10 MG/ML
1 INJECTION INTRAVENOUS
Qty: 40 | Refills: 0 | Status: DISCONTINUED | OUTPATIENT
Start: 2019-04-19 | End: 2019-04-20

## 2019-04-19 RX ORDER — SODIUM CHLORIDE 9 MG/ML
500 INJECTION, SOLUTION INTRAVENOUS ONCE
Qty: 0 | Refills: 0 | Status: COMPLETED | OUTPATIENT
Start: 2019-04-19 | End: 2019-04-19

## 2019-04-19 RX ORDER — CEFAZOLIN SODIUM 1 G
2000 VIAL (EA) INJECTION EVERY 8 HOURS
Qty: 0 | Refills: 0 | Status: COMPLETED | OUTPATIENT
Start: 2019-04-19 | End: 2019-04-21

## 2019-04-19 RX ORDER — PROPOFOL 10 MG/ML
10 INJECTION, EMULSION INTRAVENOUS
Qty: 1000 | Refills: 0 | Status: DISCONTINUED | OUTPATIENT
Start: 2019-04-19 | End: 2019-04-20

## 2019-04-19 RX ORDER — NOREPINEPHRINE BITARTRATE/D5W 8 MG/250ML
0.05 PLASTIC BAG, INJECTION (ML) INTRAVENOUS
Qty: 8 | Refills: 0 | Status: DISCONTINUED | OUTPATIENT
Start: 2019-04-19 | End: 2019-04-19

## 2019-04-19 RX ORDER — ACETAMINOPHEN 500 MG
1000 TABLET ORAL ONCE
Qty: 0 | Refills: 0 | Status: COMPLETED | OUTPATIENT
Start: 2019-04-19 | End: 2019-04-20

## 2019-04-19 RX ORDER — DOBUTAMINE HCL 250MG/20ML
2.5 VIAL (ML) INTRAVENOUS
Qty: 500 | Refills: 0 | Status: DISCONTINUED | OUTPATIENT
Start: 2019-04-19 | End: 2019-04-20

## 2019-04-19 RX ORDER — SODIUM CHLORIDE 9 MG/ML
1000 INJECTION INTRAMUSCULAR; INTRAVENOUS; SUBCUTANEOUS
Qty: 0 | Refills: 0 | Status: DISCONTINUED | OUTPATIENT
Start: 2019-04-19 | End: 2019-04-26

## 2019-04-19 RX ORDER — HEPARIN SODIUM 5000 [USP'U]/ML
1400 INJECTION INTRAVENOUS; SUBCUTANEOUS
Qty: 25000 | Refills: 0 | Status: DISCONTINUED | OUTPATIENT
Start: 2019-04-19 | End: 2019-04-19

## 2019-04-19 RX ORDER — KETOROLAC TROMETHAMINE 30 MG/ML
15 SYRINGE (ML) INJECTION ONCE
Qty: 0 | Refills: 0 | Status: DISCONTINUED | OUTPATIENT
Start: 2019-04-19 | End: 2019-04-19

## 2019-04-19 RX ORDER — BUDESONIDE, MICRONIZED 100 %
0.25 POWDER (GRAM) MISCELLANEOUS
Qty: 0 | Refills: 0 | Status: DISCONTINUED | OUTPATIENT
Start: 2019-04-19 | End: 2019-04-21

## 2019-04-19 RX ORDER — HEPARIN SODIUM 5000 [USP'U]/ML
5000 INJECTION INTRAVENOUS; SUBCUTANEOUS EVERY 8 HOURS
Qty: 0 | Refills: 0 | Status: DISCONTINUED | OUTPATIENT
Start: 2019-04-19 | End: 2019-04-27

## 2019-04-19 RX ORDER — ACETAMINOPHEN 500 MG
1000 TABLET ORAL ONCE
Qty: 0 | Refills: 0 | Status: COMPLETED | OUTPATIENT
Start: 2019-04-19 | End: 2019-04-19

## 2019-04-19 RX ORDER — CEFAZOLIN SODIUM 1 G
2000 VIAL (EA) INJECTION EVERY 8 HOURS
Qty: 0 | Refills: 0 | Status: DISCONTINUED | OUTPATIENT
Start: 2019-04-19 | End: 2019-04-19

## 2019-04-19 RX ORDER — FENTANYL CITRATE 50 UG/ML
25 INJECTION INTRAVENOUS ONCE
Qty: 0 | Refills: 0 | Status: DISCONTINUED | OUTPATIENT
Start: 2019-04-19 | End: 2019-04-20

## 2019-04-19 RX ORDER — FENTANYL CITRATE 50 UG/ML
25 INJECTION INTRAVENOUS ONCE
Qty: 0 | Refills: 0 | Status: DISCONTINUED | OUTPATIENT
Start: 2019-04-19 | End: 2019-04-19

## 2019-04-19 RX ORDER — DEXTROSE 50 % IN WATER 50 %
25 SYRINGE (ML) INTRAVENOUS
Qty: 0 | Refills: 0 | Status: DISCONTINUED | OUTPATIENT
Start: 2019-04-19 | End: 2019-04-26

## 2019-04-19 RX ORDER — ASPIRIN/CALCIUM CARB/MAGNESIUM 324 MG
81 TABLET ORAL DAILY
Qty: 0 | Refills: 0 | Status: DISCONTINUED | OUTPATIENT
Start: 2019-04-19 | End: 2019-04-27

## 2019-04-19 RX ADMIN — CHLORHEXIDINE GLUCONATE 1 APPLICATION(S): 213 SOLUTION TOPICAL at 05:39

## 2019-04-19 RX ADMIN — SODIUM CHLORIDE 1000 MILLILITER(S): 9 INJECTION, SOLUTION INTRAVENOUS at 22:00

## 2019-04-19 RX ADMIN — Medication 125 MILLILITER(S): at 16:39

## 2019-04-19 RX ADMIN — Medication 2000 MILLIGRAM(S): at 19:19

## 2019-04-19 RX ADMIN — Medication 125 MILLILITER(S): at 16:35

## 2019-04-19 RX ADMIN — Medication 15 MILLIGRAM(S): at 22:45

## 2019-04-19 RX ADMIN — DEXMEDETOMIDINE HYDROCHLORIDE IN 0.9% SODIUM CHLORIDE 5.25 MICROGRAM(S)/KG/HR: 4 INJECTION INTRAVENOUS at 21:10

## 2019-04-19 RX ADMIN — FENTANYL CITRATE 25 MICROGRAM(S): 50 INJECTION INTRAVENOUS at 15:36

## 2019-04-19 RX ADMIN — Medication 15 MILLIGRAM(S): at 23:15

## 2019-04-19 RX ADMIN — Medication 0.25 MILLIGRAM(S): at 21:21

## 2019-04-19 RX ADMIN — Medication 400 MILLIGRAM(S): at 19:20

## 2019-04-19 RX ADMIN — Medication 125 MILLILITER(S): at 15:36

## 2019-04-19 RX ADMIN — LEVALBUTEROL 0.63 MILLIGRAM(S): 1.25 SOLUTION, CONCENTRATE RESPIRATORY (INHALATION) at 21:43

## 2019-04-19 RX ADMIN — Medication 125 MILLILITER(S): at 18:12

## 2019-04-19 RX ADMIN — HEPARIN SODIUM 14 UNIT(S)/HR: 5000 INJECTION INTRAVENOUS; SUBCUTANEOUS at 05:00

## 2019-04-19 RX ADMIN — FENTANYL CITRATE 25 MICROGRAM(S): 50 INJECTION INTRAVENOUS at 16:00

## 2019-04-19 RX ADMIN — CHLORHEXIDINE GLUCONATE 5 MILLILITER(S): 213 SOLUTION TOPICAL at 05:39

## 2019-04-19 RX ADMIN — HEPARIN SODIUM 14 UNIT(S)/HR: 5000 INJECTION INTRAVENOUS; SUBCUTANEOUS at 03:34

## 2019-04-19 RX ADMIN — FAMOTIDINE 20 MILLIGRAM(S): 10 INJECTION INTRAVENOUS at 17:57

## 2019-04-19 RX ADMIN — CHLORHEXIDINE GLUCONATE 5 MILLILITER(S): 213 SOLUTION TOPICAL at 17:57

## 2019-04-19 NOTE — DIETITIAN INITIAL EVALUATION ADULT. - ENERGY NEEDS
Ht 170.18cm; Wt 70Kg  IBW 67.3Kg; %%  BMI 24.2    Utilized IBW to calculate needs, adjusted for vent/sx.

## 2019-04-19 NOTE — PROGRESS NOTE ADULT - SUBJECTIVE AND OBJECTIVE BOX
CTICU  CRITICAL  CARE  attending     Hand off received 					   Pertinent clinical, laboratory, radiographic, hemodynamic, echocardiographic, respiratory data, microbiologic data and chart were reviewed and analyzed frequently throughout the course of the day and night  Patient seen and examined with CTS/ SH attending at bedside      50 y/o [place]ish Speaking Male with ? H/O DM, HTN, HLD.  (He  came from [place] 1 month ago, now living in [place]), On No Current Meds at Home, w/ FMHX CAD/MI, Ex-Marijuana User (Quit 15 yrs ago) w/ PMHX Prior Diagnostic Cardiac Cath. / presumed Non-Obstructive CAD in [place], who reports C/P x 3 years, known to Dr. Zhong w/ recent abnormal Stress Echo 4/15/19 revealing, terminated due to C/P, 72 % of maximum predicted target HR which renders the stress load achieved inconclusive/non-diagnostic for illustration of possible presence of myocardial ischemia LVEF 60-65%, patient was initially scheduled for ambulatory cath today however due to unstable angina presented to [facility] ED today. Pt c/o progressive 10/10 L sided "sharp" C/P independent of rest/activity, w/ prior radiation to the back (not radiating today). Associated symptoms include SOB, dizziness, diaphoresis, nausea, fatigue / general weakness. Pt denies vomiting, orthopnea/PND, LE edema, syncope. In ED /76, HR 60s, RR 16, T 97, O2 98% RA. Labs significant for Troponin negative, WBC 10.9. EKG showed NSR @ 73bpm, w/ incomplete RBBB, j point elevation in V2-V4, I, AVL, TWI and ST depression up to 0.5mmg in II, III, AVF. ASA 325mg and Plavix 600mg given. Pt received SL NTG x 3 without relief. C/P noted to be worse with palpation of chest wall. Repeat EKG done, both EKGs reviewed with Dr. Zhong, additional j point elevation noted in V1, otherwise no change. Pt then received Morphine 4mg IV, currently comfortable at rest and on ambulation with no reported symptoms. CXR clear per verbal radiology read. Denies fever/chills, cough, abdominal pain, and diarrhea.    Due to patients  risk factors, unable angina and non-conclusive stress echo pt is referred for cardiac catheterization w/ possible intervention       HEALTH ISSUES - PROBLEM Dx:  Leukocytosis: Leukocytosis  Unstable angina: Unstable angina      FAMILY HISTORY:  Family history of myocardial infarction: Father (age unknown)  PAST MEDICAL & SURGICAL HISTORY:  No pertinent past medical history  H/O cardiac catheterization    Patient is a 49y old  Male who presents with Unstable Angina.    14 system review was unremarkable  acute changes include acute respiratory failure  Vital signs, hemodynamic and respiratory parameters were reviewed from the bedside nursing flow sheet.  ICU Vital Signs Last 24 Hrs  T(C): 36.6 (2019 21:40), Max: 36.6 (2019 21:40)  T(F): 97.8 (2019 21:40), Max: 97.8 (2019 21:40)  HR: 98 (2019 21:34) (60 - 106)  BP: 98/54 (2019 05:32) (98/54 - 115/60)  BP(mean): 74 (2019 05:00) (74 - 82)  ABP: 120/68 (2019 21:00) (88/45 - 144/68)  ABP(mean): 86 (2019 21:00) (59 - 90)  RR: 17 (2019 21:34) (14 - 21)  SpO2: 98% (2019 21:34) (95% - 100%)    Adult Advanced Hemodynamics Last 24 Hrs  CVP(mm Hg): 20 (2019 21:00) (0 - 20)  CVP(cm H2O): --  CO: --  CI: --  PA: --  PA(mean): --  PCWP: --  SVR: --  SVRI: --  PVR: --  PVRI: --, ABG - ( 2019 21:49 )  pH, Arterial: 7.43  pH, Blood: x     /  pCO2: 35    /  pO2: 137   / HCO3: 23    / Base Excess: -1.2  /  SaO2: 99                Mode: CPAP with PS  FiO2: 40  PEEP: 5  PS: 12  MAP: 8  PIP: 18    Intake and output was reviewed and the fluid balance was calculated  Daily Height in cm: 170.18 (2019 05:32)    Daily Weight in k.3 (2019 14:27)  I&O's Summary    2019 07:01  -  2019 07:00  --------------------------------------------------------  IN: 411 mL / OUT: 1000 mL / NET: -589 mL    2019 07:01  -  2019 22:27  --------------------------------------------------------  IN: 1198.4 mL / OUT: 1655 mL / NET: -456.6 mL        All lines and drain sites were assessed  Glycemic trend was reviewed CAPILLARY BLOOD GLUCOSE      POCT Blood Glucose.: 135 mg/dL (2019 21:41)        No acute change in mental status from the baseline. Pupils 3 mm (Reactive).  CVS: S1, S2, No S3.  Auscultation of the chest reveals Bilateral Rhonchi.  GI: Abdomen is soft. No tenderness. + Bowel sounds.  Extremities are warm and well perfused.  VASCULAR: + DP/PT.  Wounds appear clean and unremarkable  Antibiotics are perioperative cefazolin.      labs  CBC Full  -  ( 2019 21:53 )  WBC Count : 17.98 K/uL  RBC Count : 3.51 M/uL  Hemoglobin : 9.9 g/dL  Hematocrit : 30.3 %  Platelet Count - Automated : 264 K/uL  Mean Cell Volume : 86.3 fl  Mean Cell Hemoglobin : 28.2 pg  Mean Cell Hemoglobin Concentration : 32.7 gm/dL  Auto Neutrophil # : x  Auto Lymphocyte # : x  Auto Monocyte # : x  Auto Eosinophil # : x  Auto Basophil # : x  Auto Neutrophil % : x  Auto Lymphocyte % : x  Auto Monocyte % : x  Auto Eosinophil % : x  Auto Basophil % : x    04-    146<H>  |  107  |  10  ----------------------------<  151<H>  4.4   |  26  |  0.83    Ca    8.4      2019 21:53  Phos  4.1     04-19  Mg     2.3     -19    TPro  5.8<L>  /  Alb  3.8  /  TBili  0.8  /  DBili  x   /  AST  61<H>  /  ALT  18  /  AlkPhos  55  04-    PT/INR - ( 2019 21:53 )   PT: 15.2 sec;   INR: 1.33          PTT - ( 2019 21:53 )  PTT:31.8 sec  The current medications were reviewed   MEDICATIONS  (STANDING):  acetaminophen  IVPB .. 1000 milliGRAM(s) IV Intermittent once  aspirin enteric coated 81 milliGRAM(s) Oral daily  buDESOnide   0.25 milliGRAM(s) Respule 0.25 milliGRAM(s) Inhalation two times a day  ceFAZolin  Injectable. 2000 milliGRAM(s) IV Push every 8 hours  chlorhexidine 0.12% Liquid 5 milliLiter(s) Oral Mucosa every 4 hours  chlorhexidine 2% Cloths 1 Application(s) Topical daily  dexmedetomidine Infusion 0.3 MICROgram(s)/kG/Hr (5.25 mL/Hr) IV Continuous <Continuous>  dextrose 50% Injectable 50 milliLiter(s) IV Push every 15 minutes  dextrose 50% Injectable 25 milliLiter(s) IV Push every 15 minutes  DOBUTamine Infusion 2.5 MICROgram(s)/kG/Min (5.25 mL/Hr) IV Continuous <Continuous>  famotidine Injectable 20 milliGRAM(s) IV Push every 12 hours  fentaNYL    Injectable 25 MICROGram(s) IV Push once  heparin  Injectable 5000 Unit(s) SubCutaneous every 8 hours  insulin Infusion 1 Unit(s)/Hr (1 mL/Hr) IV Continuous <Continuous>  levalbuterol Inhalation 0.63 milliGRAM(s) Inhalation every 6 hours  phenylephrine    Infusion 1 MICROgram(s)/kG/Min (26.25 mL/Hr) IV Continuous <Continuous>  propofol Infusion 10 MICROgram(s)/kG/Min (4.2 mL/Hr) IV Continuous <Continuous>  sodium chloride 0.9%. 1000 milliLiter(s) (10 mL/Hr) IV Continuous <Continuous>    MEDICATIONS  (PRN):       PROBLEM LIST/ ASSESSMENT:  HEALTH ISSUES - PROBLEM Dx:  Leukocytosis: Leukocytosis  Unstable angina: Unstable angina        Patient is a 49y old  Male who presents with  Unstable Angina   WORK UP: Triple vessel CAD.  S/P CABG  Hemodynamically stable.  Diuresing well.  Good oxygenation.  Overall doing well.       My plan includes :  EXTUBATE  Close hemodynamic, ventilatory and drain monitoring and management.  Monitor for arrhythmias and monitor parameters for organ perfusion  Monitor neurologic status.  BETA  BLOCKER AND STATIN.  Head of the bed should remain elevated to 45 deg .   Chest PT and IS will be encouraged  Monitor adequacy of oxygenation and ventilation and attempt to wean oxygen  Nutritional goals will be met using po eventually , ensure adequate caloric intake and montior the same  Stress ulcer and VTE prophylaxis will be achieved    Glycemic control is satisfactory  Electrolytes have been repleted as necessary and wound care has been carried out. Pain control has been achieved.   agressive physical therapy and early mobility and ambulation goals will be met   The family was updated about the course and plan  CRITICAL CARE TIME SPENT in evaluation and management, reassessments, review and interpretation of labs and x-rays, ventilator and hemodynamic management, formulating a plan and coordinating care: ___30____ MIN.  Time does not include procedural time.  CTICU ATTENDING     					    Nicola Rome MD

## 2019-04-19 NOTE — DIETITIAN INITIAL EVALUATION ADULT. - NS AS NUTRI INTERV ENTERAL NUTRITION
Rate/Route/Composition/If pt remains intubated >48hrs, recommend early EN with route per MD as medically feasible; Vital 1.5 with goal rate of 50ml/hr x 24hr + 1x prostat (1200ml TV, 1900kcal, 96g, 916ml water). Additional fluids per MD. Start at 30ml and advance as tolerated by 10ml q4h. Monitor for s/s of intolerance and maintain aspiration precautions.

## 2019-04-19 NOTE — DIETITIAN INITIAL EVALUATION ADULT. - PROBLEM SELECTOR PLAN 2
Afebrile. No infiltrate on CXR per verbal radiology read. No infectious symptoms.    DVT PPX: Intermittent Pneumatic Compression B/L LE     Case discussed w/ Dr. Zhong    Dispo: for cardiac cath.

## 2019-04-19 NOTE — BRIEF OPERATIVE NOTE - NSICDXBRIEFPROCEDURE_GEN_ALL_CORE_FT
PROCEDURES:  CABG x 4 19-Apr-2019 14:37:16 LIMA - LAD, SVG - RCA, SVG - OM, RADIAL - DIAG, EF 45% Tank Johnson

## 2019-04-19 NOTE — DIETITIAN INITIAL EVALUATION ADULT. - OTHER INFO
50y/o M admitted with unstable angina pre-op CABG today. NPO. Attempted to see pt this am, but off the unit for the OR. Unsure of diet/wt history PTA. With continued intubation, recommend EN initiation with route per MD as medically feasible. If pt able to be safely extubated, perform dysphagia screen vs formal S&S prior to PO. RD to follow per policy.

## 2019-04-19 NOTE — PRE-OP CHECKLIST - SELECT TESTS ORDERED
Type and Screen/CXR/BMP/INR/Urinalysis/EKG/CBC/PT/PTT 82/BMP/Type and Screen/Urinalysis/EKG/POCT Blood Glucose/CXR/CBC/PT/PTT/INR

## 2019-04-19 NOTE — PROGRESS NOTE ADULT - SUBJECTIVE AND OBJECTIVE BOX
CTICU  CRITICAL  CARE  attending     Hand off received 					   Pertinent clinical, laboratory, radiographic, hemodynamic, echocardiographic, respiratory data, microbiologic data and chart were reviewed and analyzed frequently throughout the course of the day and night  Patient seen and examined with CTS/ SH attending at bedside    Pt is a 49y , Male, s/p CABG x 4    intraop:    hemodynamic instability  s/p 1 unit PRBC    post op:    continued hemodynamic instability in the ICU  fluid resuscitated  mild lactic acidosis  full vent support  pressor support          Leukocytosis: Leukocytosis  Unstable angina: Unstable angina      , FAMILY HISTORY:  Family history of myocardial infarction: Father (age unknown)  PAST MEDICAL & SURGICAL HISTORY:  No pertinent past medical history  H/O cardiac catheterization    Patient is a 49y old  Male who presents with a chief complaint of Unstable Angina (2019 16:19)      14 system review was unremarkable  acute changes include acute respiratory failure  Vital signs, hemodynamic and respiratory parameters were reviewed from the bedside nursing flowsheet.  ICU Vital Signs Last 24 Hrs  T(C): 36.2 (2019 17:09), Max: 36.8 (2019 22:24)  T(F): 97.2 (2019 17:09), Max: 98.2 (2019 22:24)  HR: 96 (2019 17:45) (60 - 103)  BP: 98/54 (2019 05:32) (98/54 - 115/60)  BP(mean): 74 (2019 05:00) (74 - 82)  ABP: 130/64 (2019 17:45) (88/45 - 130/64)  ABP(mean): 84 (2019 17:45) (59 - 84)  RR: 18 (2019 17:45) (14 - 21)  SpO2: 100% (2019 17:45) (95% - 100%)    Adult Advanced Hemodynamics Last 24 Hrs  CVP(mm Hg): 19 (2019 17:45) (0 - 19)  CVP(cm H2O): --  CO: --  CI: --  PA: --  PA(mean): --  PCWP: --  SVR: --  SVRI: --  PVR: --  PVRI: --, ABG - ( 2019 17:03 )  pH, Arterial: 7.37  pH, Blood: x     /  pCO2: 49    /  pO2: 170   / HCO3: 28    / Base Excess: 2.0   /  SaO2: 99                Mode: AC/ CMV (Assist Control/ Continuous Mandatory Ventilation)  RR (machine): 15  TV (machine): 450  FiO2: 100  PEEP: 5  ITime: 1  MAP: 10  PIP: 23    Intake and output was reviewed and the fluid balance was calculated  Daily Height in cm: 170.18 (2019 05:32)    Daily Weight in k.3 (2019 14:27)  I&O's Summary    2019 07:  -  2019 07:00  --------------------------------------------------------  IN: 411 mL / OUT: 1000 mL / NET: -589 mL    2019 07:01  -  2019 18:13  --------------------------------------------------------  IN: 1330.3 mL / OUT: 1135 mL / NET: 195.3 mL        All lines and drain sites were assessed  [facility]      POCT Blood Glucose.: 155 mg/dL (2019 17:01)    No acute change in mental status  Auscultation of the chest reveals equal bs  Abdomen is soft  Extremities are warm and well perfused  Wounds appear clean and unremarkable  Antibiotics are periop    labs  CBC Full  -  ( 2019 15:19 )  WBC Count : 30.79 K/uL  RBC Count : 3.93 M/uL  Hemoglobin : 11.1 g/dL  Hematocrit : 33.7 %  Platelet Count - Automated : 270 K/uL  Mean Cell Volume : 85.8 fl  Mean Cell Hemoglobin : 28.2 pg  Mean Cell Hemoglobin Concentration : 32.9 gm/dL  Auto Neutrophil # : 27.83 K/uL  Auto Lymphocyte # : 1.35 K/uL  Auto Monocyte # : 0.80 K/uL  Auto Eosinophil # : 0.52 K/uL  Auto Basophil # : 0.00 K/uL  Auto Neutrophil % : 62.3 %  Auto Lymphocyte % : 4.4 %  Auto Monocyte % : 2.6 %  Auto Eosinophil % : 1.7 %  Auto Basophil % : 0.0 %        146<H>  |  108  |  9   ----------------------------<  148<H>  4.1   |  25  |  0.77    Ca    8.4      2019 17:45  Mg     2.2         TPro  5.3<L>  /  Alb  3.4  /  TBili  1.3<H>  /  DBili  x   /  AST  53<H>  /  ALT  17  /  AlkPhos  49      PT/INR - ( 2019 17:45 )   PT: 16.8 sec;   INR: 1.47          PTT - ( 2019 17:45 )  PTT:30.3 sec  The current medications were reviewed   MEDICATIONS  (STANDING):  aspirin enteric coated 81 milliGRAM(s) Oral daily  ceFAZolin  Injectable. 2000 milliGRAM(s) IV Push every 8 hours  chlorhexidine 0.12% Liquid 5 milliLiter(s) Oral Mucosa every 4 hours  chlorhexidine 2% Cloths 1 Application(s) Topical daily  dexmedetomidine Infusion 0.3 MICROgram(s)/kG/Hr (5.25 mL/Hr) IV Continuous <Continuous>  dextrose 50% Injectable 50 milliLiter(s) IV Push every 15 minutes  dextrose 50% Injectable 25 milliLiter(s) IV Push every 15 minutes  DOBUTamine Infusion 2.5 MICROgram(s)/kG/Min (5.25 mL/Hr) IV Continuous <Continuous>  famotidine Injectable 20 milliGRAM(s) IV Push every 12 hours  heparin  Injectable 5000 Unit(s) SubCutaneous every 8 hours  insulin Infusion 1 Unit(s)/Hr (1 mL/Hr) IV Continuous <Continuous>  norepinephrine Infusion 0.05 MICROgram(s)/kG/Min (6.563 mL/Hr) IV Continuous <Continuous>  phenylephrine    Infusion 1 MICROgram(s)/kG/Min (26.25 mL/Hr) IV Continuous <Continuous>  propofol Infusion 10 MICROgram(s)/kG/Min (4.2 mL/Hr) IV Continuous <Continuous>  sodium chloride 0.9%. 1000 milliLiter(s) (10 mL/Hr) IV Continuous <Continuous>    MEDICATIONS  (PRN):       PROBLEM LIST/ ASSESSMENT:  HEALTH ISSUES - PROBLEM Dx:  hemodynamic instability  s/p CABG  Leukocytosis: Leukocytosis  Unstable angina: Unstable angina      ,   Patient is a 49y old  Male who presents with a chief complaint of Unstable Angina (2019 16:19)     s/p CABG      My plan includes :  close hemodynamic, ventilatory and drain monitoring and management per post op routine    Monitor for arrhythmias and monitor parameters for organ perfusion  monitor neurologic status  Head of the bed should remain elevated to 45 deg .   chest PT and IS will be encouraged  monitor adequacy of oxygenation and ventilation and attempt to wean oxygen  Nutritional goals will be met using po eventually , ensure adequate caloric intake and montior the same  Stress ulcer and VTE prophylaxis will be achieved    Glycemic control is satisfactory  Electrolytes have been repleted as necessary and wound care has been carried out. Pain control has been achieved.   agressive physical therapy and early mobility and ambulation goals will be met   The family was updated about the course and plan  CRITICAL CARE TIME SPENT in evaluation and management, reassessments, review and interpretation of labs and x-rays, ventilator and hemodynamic management, formulating a plan and coordinating care: __55____ MIN.  Time does not include procedural time.  CTICU ATTENDING     					    Niko Cardenas MD

## 2019-04-19 NOTE — DIETITIAN INITIAL EVALUATION ADULT. - NS AS NUTRI INTERV MEALS SNACK
Diets modified for specific foods and ingredients/Mineral - modified diet/Dash/TLC diet per appropriate consistency if extubated post-op

## 2019-04-20 LAB
ALBUMIN SERPL ELPH-MCNC: 3.4 G/DL — SIGNIFICANT CHANGE UP (ref 3.3–5)
ALBUMIN SERPL ELPH-MCNC: 4.2 G/DL — SIGNIFICANT CHANGE UP (ref 3.3–5)
ALP SERPL-CCNC: 45 U/L — SIGNIFICANT CHANGE UP (ref 40–120)
ALP SERPL-CCNC: 53 U/L — SIGNIFICANT CHANGE UP (ref 40–120)
ALT FLD-CCNC: 17 U/L — SIGNIFICANT CHANGE UP (ref 10–45)
ALT FLD-CCNC: 21 U/L — SIGNIFICANT CHANGE UP (ref 10–45)
ANION GAP SERPL CALC-SCNC: 11 MMOL/L — SIGNIFICANT CHANGE UP (ref 5–17)
ANION GAP SERPL CALC-SCNC: 12 MMOL/L — SIGNIFICANT CHANGE UP (ref 5–17)
ANION GAP SERPL CALC-SCNC: 7 MMOL/L — SIGNIFICANT CHANGE UP (ref 5–17)
APTT BLD: 32 SEC — SIGNIFICANT CHANGE UP (ref 27.5–36.3)
APTT BLD: 33.2 SEC — SIGNIFICANT CHANGE UP (ref 27.5–36.3)
APTT BLD: 34.5 SEC — SIGNIFICANT CHANGE UP (ref 27.5–36.3)
AST SERPL-CCNC: 43 U/L — HIGH (ref 10–40)
AST SERPL-CCNC: 46 U/L — HIGH (ref 10–40)
BASE EXCESS BLDV CALC-SCNC: 0.3 MMOL/L — SIGNIFICANT CHANGE UP
BASOPHILS # BLD AUTO: 0.01 K/UL — SIGNIFICANT CHANGE UP (ref 0–0.2)
BASOPHILS NFR BLD AUTO: 0.1 % — SIGNIFICANT CHANGE UP (ref 0–2)
BILIRUB SERPL-MCNC: 0.4 MG/DL — SIGNIFICANT CHANGE UP (ref 0.2–1.2)
BILIRUB SERPL-MCNC: 0.4 MG/DL — SIGNIFICANT CHANGE UP (ref 0.2–1.2)
BUN SERPL-MCNC: 12 MG/DL — SIGNIFICANT CHANGE UP (ref 7–23)
BUN SERPL-MCNC: 12 MG/DL — SIGNIFICANT CHANGE UP (ref 7–23)
BUN SERPL-MCNC: 14 MG/DL — SIGNIFICANT CHANGE UP (ref 7–23)
CALCIUM SERPL-MCNC: 8.4 MG/DL — SIGNIFICANT CHANGE UP (ref 8.4–10.5)
CALCIUM SERPL-MCNC: 8.7 MG/DL — SIGNIFICANT CHANGE UP (ref 8.4–10.5)
CALCIUM SERPL-MCNC: 8.8 MG/DL — SIGNIFICANT CHANGE UP (ref 8.4–10.5)
CHLORIDE SERPL-SCNC: 103 MMOL/L — SIGNIFICANT CHANGE UP (ref 96–108)
CHLORIDE SERPL-SCNC: 106 MMOL/L — SIGNIFICANT CHANGE UP (ref 96–108)
CHLORIDE SERPL-SCNC: 108 MMOL/L — SIGNIFICANT CHANGE UP (ref 96–108)
CO2 SERPL-SCNC: 26 MMOL/L — SIGNIFICANT CHANGE UP (ref 22–31)
CO2 SERPL-SCNC: 26 MMOL/L — SIGNIFICANT CHANGE UP (ref 22–31)
CO2 SERPL-SCNC: 30 MMOL/L — SIGNIFICANT CHANGE UP (ref 22–31)
CREAT SERPL-MCNC: 0.82 MG/DL — SIGNIFICANT CHANGE UP (ref 0.5–1.3)
CREAT SERPL-MCNC: 0.85 MG/DL — SIGNIFICANT CHANGE UP (ref 0.5–1.3)
CREAT SERPL-MCNC: 0.86 MG/DL — SIGNIFICANT CHANGE UP (ref 0.5–1.3)
EOSINOPHIL # BLD AUTO: 0.01 K/UL — SIGNIFICANT CHANGE UP (ref 0–0.5)
EOSINOPHIL NFR BLD AUTO: 0.1 % — SIGNIFICANT CHANGE UP (ref 0–6)
GAS PNL BLDA: SIGNIFICANT CHANGE UP
GLUCOSE BLDC GLUCOMTR-MCNC: 105 MG/DL — HIGH (ref 70–99)
GLUCOSE BLDC GLUCOMTR-MCNC: 121 MG/DL — HIGH (ref 70–99)
GLUCOSE BLDC GLUCOMTR-MCNC: 127 MG/DL — HIGH (ref 70–99)
GLUCOSE BLDC GLUCOMTR-MCNC: 130 MG/DL — HIGH (ref 70–99)
GLUCOSE BLDC GLUCOMTR-MCNC: 130 MG/DL — HIGH (ref 70–99)
GLUCOSE SERPL-MCNC: 117 MG/DL — HIGH (ref 70–99)
GLUCOSE SERPL-MCNC: 132 MG/DL — HIGH (ref 70–99)
GLUCOSE SERPL-MCNC: 219 MG/DL — HIGH (ref 70–99)
HCO3 BLDV-SCNC: 26 MMOL/L — SIGNIFICANT CHANGE UP (ref 20–27)
HCT VFR BLD CALC: 24.8 % — LOW (ref 39–50)
HCT VFR BLD CALC: 26.1 % — LOW (ref 39–50)
HCT VFR BLD CALC: 28.3 % — LOW (ref 39–50)
HCT VFR BLD CALC: 28.6 % — LOW (ref 39–50)
HGB BLD-MCNC: 8 G/DL — LOW (ref 13–17)
HGB BLD-MCNC: 8.3 G/DL — LOW (ref 13–17)
HGB BLD-MCNC: 9 G/DL — LOW (ref 13–17)
HGB BLD-MCNC: 9.3 G/DL — LOW (ref 13–17)
IMM GRANULOCYTES NFR BLD AUTO: 0.3 % — SIGNIFICANT CHANGE UP (ref 0–1.5)
INR BLD: 1.45 — HIGH (ref 0.88–1.16)
INR BLD: 1.6 — HIGH (ref 0.88–1.16)
INR BLD: 1.6 — HIGH (ref 0.88–1.16)
LACTATE SERPL-SCNC: 1.4 MMOL/L — SIGNIFICANT CHANGE UP (ref 0.5–2)
LACTATE SERPL-SCNC: 2.8 MMOL/L — HIGH (ref 0.5–2)
LACTATE SERPL-SCNC: 2.9 MMOL/L — HIGH (ref 0.5–2)
LYMPHOCYTES # BLD AUTO: 19.2 % — SIGNIFICANT CHANGE UP (ref 13–44)
LYMPHOCYTES # BLD AUTO: 2.62 K/UL — SIGNIFICANT CHANGE UP (ref 1–3.3)
MAGNESIUM SERPL-MCNC: 2.1 MG/DL — SIGNIFICANT CHANGE UP (ref 1.6–2.6)
MAGNESIUM SERPL-MCNC: 2.2 MG/DL — SIGNIFICANT CHANGE UP (ref 1.6–2.6)
MAGNESIUM SERPL-MCNC: 2.3 MG/DL — SIGNIFICANT CHANGE UP (ref 1.6–2.6)
MCHC RBC-ENTMCNC: 27.5 PG — SIGNIFICANT CHANGE UP (ref 27–34)
MCHC RBC-ENTMCNC: 27.6 PG — SIGNIFICANT CHANGE UP (ref 27–34)
MCHC RBC-ENTMCNC: 27.9 PG — SIGNIFICANT CHANGE UP (ref 27–34)
MCHC RBC-ENTMCNC: 28.3 PG — SIGNIFICANT CHANGE UP (ref 27–34)
MCHC RBC-ENTMCNC: 31.8 GM/DL — LOW (ref 32–36)
MCHC RBC-ENTMCNC: 31.8 GM/DL — LOW (ref 32–36)
MCHC RBC-ENTMCNC: 32.3 GM/DL — SIGNIFICANT CHANGE UP (ref 32–36)
MCHC RBC-ENTMCNC: 32.5 GM/DL — SIGNIFICANT CHANGE UP (ref 32–36)
MCV RBC AUTO: 85.9 FL — SIGNIFICANT CHANGE UP (ref 80–100)
MCV RBC AUTO: 86.4 FL — SIGNIFICANT CHANGE UP (ref 80–100)
MCV RBC AUTO: 86.8 FL — SIGNIFICANT CHANGE UP (ref 80–100)
MCV RBC AUTO: 87.6 FL — SIGNIFICANT CHANGE UP (ref 80–100)
MONOCYTES # BLD AUTO: 1.39 K/UL — HIGH (ref 0–0.9)
MONOCYTES NFR BLD AUTO: 10.2 % — SIGNIFICANT CHANGE UP (ref 2–14)
NEUTROPHILS # BLD AUTO: 9.58 K/UL — HIGH (ref 1.8–7.4)
NEUTROPHILS NFR BLD AUTO: 70.1 % — SIGNIFICANT CHANGE UP (ref 43–77)
NRBC # BLD: 0 /100 WBCS — SIGNIFICANT CHANGE UP (ref 0–0)
PCO2 BLDV: 45 MMHG — SIGNIFICANT CHANGE UP (ref 41–51)
PH BLDV: 7.37 — SIGNIFICANT CHANGE UP (ref 7.32–7.43)
PHOSPHATE SERPL-MCNC: 3.1 MG/DL — SIGNIFICANT CHANGE UP (ref 2.5–4.5)
PHOSPHATE SERPL-MCNC: 3.3 MG/DL — SIGNIFICANT CHANGE UP (ref 2.5–4.5)
PHOSPHATE SERPL-MCNC: 5 MG/DL — HIGH (ref 2.5–4.5)
PLATELET # BLD AUTO: 206 K/UL — SIGNIFICANT CHANGE UP (ref 150–400)
PLATELET # BLD AUTO: 220 K/UL — SIGNIFICANT CHANGE UP (ref 150–400)
PLATELET # BLD AUTO: 230 K/UL — SIGNIFICANT CHANGE UP (ref 150–400)
PLATELET # BLD AUTO: 250 K/UL — SIGNIFICANT CHANGE UP (ref 150–400)
PO2 BLDV: 31 MMHG — SIGNIFICANT CHANGE UP
POTASSIUM SERPL-MCNC: 3.9 MMOL/L — SIGNIFICANT CHANGE UP (ref 3.5–5.3)
POTASSIUM SERPL-MCNC: 4.3 MMOL/L — SIGNIFICANT CHANGE UP (ref 3.5–5.3)
POTASSIUM SERPL-MCNC: 4.7 MMOL/L — SIGNIFICANT CHANGE UP (ref 3.5–5.3)
POTASSIUM SERPL-SCNC: 3.9 MMOL/L — SIGNIFICANT CHANGE UP (ref 3.5–5.3)
POTASSIUM SERPL-SCNC: 4.3 MMOL/L — SIGNIFICANT CHANGE UP (ref 3.5–5.3)
POTASSIUM SERPL-SCNC: 4.7 MMOL/L — SIGNIFICANT CHANGE UP (ref 3.5–5.3)
PROT SERPL-MCNC: 6 G/DL — SIGNIFICANT CHANGE UP (ref 6–8.3)
PROT SERPL-MCNC: 6.2 G/DL — SIGNIFICANT CHANGE UP (ref 6–8.3)
PROTHROM AB SERPL-ACNC: 16.6 SEC — HIGH (ref 10–12.9)
PROTHROM AB SERPL-ACNC: 18.4 SEC — HIGH (ref 10–12.9)
PROTHROM AB SERPL-ACNC: 18.4 SEC — HIGH (ref 10–12.9)
RBC # BLD: 2.83 M/UL — LOW (ref 4.2–5.8)
RBC # BLD: 3.02 M/UL — LOW (ref 4.2–5.8)
RBC # BLD: 3.26 M/UL — LOW (ref 4.2–5.8)
RBC # BLD: 3.33 M/UL — LOW (ref 4.2–5.8)
RBC # FLD: 14.8 % — HIGH (ref 10.3–14.5)
RBC # FLD: 15.1 % — HIGH (ref 10.3–14.5)
RBC # FLD: 15.3 % — HIGH (ref 10.3–14.5)
RBC # FLD: 15.3 % — HIGH (ref 10.3–14.5)
SAO2 % BLDV: 60 % — SIGNIFICANT CHANGE UP
SODIUM SERPL-SCNC: 141 MMOL/L — SIGNIFICANT CHANGE UP (ref 135–145)
SODIUM SERPL-SCNC: 143 MMOL/L — SIGNIFICANT CHANGE UP (ref 135–145)
SODIUM SERPL-SCNC: 145 MMOL/L — SIGNIFICANT CHANGE UP (ref 135–145)
WBC # BLD: 12.37 K/UL — HIGH (ref 3.8–10.5)
WBC # BLD: 13.65 K/UL — HIGH (ref 3.8–10.5)
WBC # BLD: 13.74 K/UL — HIGH (ref 3.8–10.5)
WBC # BLD: 15.2 K/UL — HIGH (ref 3.8–10.5)
WBC # FLD AUTO: 12.37 K/UL — HIGH (ref 3.8–10.5)
WBC # FLD AUTO: 13.65 K/UL — HIGH (ref 3.8–10.5)
WBC # FLD AUTO: 13.74 K/UL — HIGH (ref 3.8–10.5)
WBC # FLD AUTO: 15.2 K/UL — HIGH (ref 3.8–10.5)

## 2019-04-20 PROCEDURE — 99291 CRITICAL CARE FIRST HOUR: CPT

## 2019-04-20 PROCEDURE — 71045 X-RAY EXAM CHEST 1 VIEW: CPT | Mod: 26

## 2019-04-20 RX ORDER — GLUCAGON INJECTION, SOLUTION 0.5 MG/.1ML
1 INJECTION, SOLUTION SUBCUTANEOUS ONCE
Qty: 0 | Refills: 0 | Status: DISCONTINUED | OUTPATIENT
Start: 2019-04-20 | End: 2019-04-27

## 2019-04-20 RX ORDER — HYDROMORPHONE HYDROCHLORIDE 2 MG/ML
0.5 INJECTION INTRAMUSCULAR; INTRAVENOUS; SUBCUTANEOUS ONCE
Qty: 0 | Refills: 0 | Status: DISCONTINUED | OUTPATIENT
Start: 2019-04-20 | End: 2019-04-20

## 2019-04-20 RX ORDER — FUROSEMIDE 40 MG
20 TABLET ORAL ONCE
Qty: 0 | Refills: 0 | Status: COMPLETED | OUTPATIENT
Start: 2019-04-20 | End: 2019-04-20

## 2019-04-20 RX ORDER — POTASSIUM CHLORIDE 20 MEQ
20 PACKET (EA) ORAL ONCE
Qty: 0 | Refills: 0 | Status: COMPLETED | OUTPATIENT
Start: 2019-04-20 | End: 2019-04-20

## 2019-04-20 RX ORDER — ACETAMINOPHEN 500 MG
1000 TABLET ORAL ONCE
Qty: 0 | Refills: 0 | Status: COMPLETED | OUTPATIENT
Start: 2019-04-20 | End: 2019-04-20

## 2019-04-20 RX ORDER — PANTOPRAZOLE SODIUM 20 MG/1
40 TABLET, DELAYED RELEASE ORAL
Qty: 0 | Refills: 0 | Status: DISCONTINUED | OUTPATIENT
Start: 2019-04-20 | End: 2019-04-27

## 2019-04-20 RX ORDER — ATORVASTATIN CALCIUM 80 MG/1
80 TABLET, FILM COATED ORAL AT BEDTIME
Qty: 0 | Refills: 0 | Status: DISCONTINUED | OUTPATIENT
Start: 2019-04-20 | End: 2019-04-27

## 2019-04-20 RX ORDER — KETOROLAC TROMETHAMINE 30 MG/ML
15 SYRINGE (ML) INJECTION ONCE
Qty: 0 | Refills: 0 | Status: DISCONTINUED | OUTPATIENT
Start: 2019-04-20 | End: 2019-04-20

## 2019-04-20 RX ORDER — OXYCODONE AND ACETAMINOPHEN 5; 325 MG/1; MG/1
1 TABLET ORAL EVERY 6 HOURS
Qty: 0 | Refills: 0 | Status: DISCONTINUED | OUTPATIENT
Start: 2019-04-20 | End: 2019-04-26

## 2019-04-20 RX ORDER — METOPROLOL TARTRATE 50 MG
12.5 TABLET ORAL EVERY 6 HOURS
Qty: 0 | Refills: 0 | Status: DISCONTINUED | OUTPATIENT
Start: 2019-04-20 | End: 2019-04-21

## 2019-04-20 RX ORDER — ALBUMIN HUMAN 25 %
250 VIAL (ML) INTRAVENOUS ONCE
Qty: 0 | Refills: 0 | Status: COMPLETED | OUTPATIENT
Start: 2019-04-20 | End: 2019-04-20

## 2019-04-20 RX ORDER — INSULIN LISPRO 100/ML
VIAL (ML) SUBCUTANEOUS
Qty: 0 | Refills: 0 | Status: DISCONTINUED | OUTPATIENT
Start: 2019-04-20 | End: 2019-04-27

## 2019-04-20 RX ORDER — ONDANSETRON 8 MG/1
4 TABLET, FILM COATED ORAL ONCE
Qty: 0 | Refills: 0 | Status: COMPLETED | OUTPATIENT
Start: 2019-04-20 | End: 2019-04-20

## 2019-04-20 RX ORDER — KETOROLAC TROMETHAMINE 30 MG/ML
30 SYRINGE (ML) INJECTION ONCE
Qty: 0 | Refills: 0 | Status: DISCONTINUED | OUTPATIENT
Start: 2019-04-20 | End: 2019-04-20

## 2019-04-20 RX ORDER — FENTANYL CITRATE 50 UG/ML
25 INJECTION INTRAVENOUS ONCE
Qty: 0 | Refills: 0 | Status: DISCONTINUED | OUTPATIENT
Start: 2019-04-20 | End: 2019-04-20

## 2019-04-20 RX ORDER — CALCIUM GLUCONATE 100 MG/ML
2 VIAL (ML) INTRAVENOUS ONCE
Qty: 0 | Refills: 0 | Status: COMPLETED | OUTPATIENT
Start: 2019-04-20 | End: 2019-04-20

## 2019-04-20 RX ORDER — SODIUM CHLORIDE 9 MG/ML
1000 INJECTION, SOLUTION INTRAVENOUS
Qty: 0 | Refills: 0 | Status: DISCONTINUED | OUTPATIENT
Start: 2019-04-20 | End: 2019-04-26

## 2019-04-20 RX ORDER — DEXTROSE 50 % IN WATER 50 %
15 SYRINGE (ML) INTRAVENOUS ONCE
Qty: 0 | Refills: 0 | Status: DISCONTINUED | OUTPATIENT
Start: 2019-04-20 | End: 2019-04-26

## 2019-04-20 RX ORDER — POLYETHYLENE GLYCOL 3350 17 G/17G
17 POWDER, FOR SOLUTION ORAL DAILY
Qty: 0 | Refills: 0 | Status: DISCONTINUED | OUTPATIENT
Start: 2019-04-20 | End: 2019-04-27

## 2019-04-20 RX ORDER — POTASSIUM CHLORIDE 20 MEQ
40 PACKET (EA) ORAL ONCE
Qty: 0 | Refills: 0 | Status: COMPLETED | OUTPATIENT
Start: 2019-04-20 | End: 2019-04-20

## 2019-04-20 RX ORDER — METOPROLOL TARTRATE 50 MG
12.5 TABLET ORAL EVERY 12 HOURS
Qty: 0 | Refills: 0 | Status: DISCONTINUED | OUTPATIENT
Start: 2019-04-20 | End: 2019-04-20

## 2019-04-20 RX ORDER — ACETAMINOPHEN 500 MG
650 TABLET ORAL EVERY 6 HOURS
Qty: 0 | Refills: 0 | Status: DISCONTINUED | OUTPATIENT
Start: 2019-04-20 | End: 2019-04-27

## 2019-04-20 RX ADMIN — LEVALBUTEROL 0.63 MILLIGRAM(S): 1.25 SOLUTION, CONCENTRATE RESPIRATORY (INHALATION) at 05:03

## 2019-04-20 RX ADMIN — Medication 125 MILLILITER(S): at 09:20

## 2019-04-20 RX ADMIN — Medication 2000 MILLIGRAM(S): at 02:11

## 2019-04-20 RX ADMIN — Medication 200 GRAM(S): at 02:11

## 2019-04-20 RX ADMIN — Medication 30 MILLIGRAM(S): at 19:40

## 2019-04-20 RX ADMIN — Medication 0.25 MILLIGRAM(S): at 21:01

## 2019-04-20 RX ADMIN — LEVALBUTEROL 0.63 MILLIGRAM(S): 1.25 SOLUTION, CONCENTRATE RESPIRATORY (INHALATION) at 09:08

## 2019-04-20 RX ADMIN — Medication 30 MILLIGRAM(S): at 20:00

## 2019-04-20 RX ADMIN — Medication 125 MILLILITER(S): at 11:28

## 2019-04-20 RX ADMIN — FENTANYL CITRATE 25 MICROGRAM(S): 50 INJECTION INTRAVENOUS at 07:05

## 2019-04-20 RX ADMIN — LEVALBUTEROL 0.63 MILLIGRAM(S): 1.25 SOLUTION, CONCENTRATE RESPIRATORY (INHALATION) at 21:01

## 2019-04-20 RX ADMIN — ONDANSETRON 4 MILLIGRAM(S): 8 TABLET, FILM COATED ORAL at 07:05

## 2019-04-20 RX ADMIN — HEPARIN SODIUM 5000 UNIT(S): 5000 INJECTION INTRAVENOUS; SUBCUTANEOUS at 13:47

## 2019-04-20 RX ADMIN — POLYETHYLENE GLYCOL 3350 17 GRAM(S): 17 POWDER, FOR SOLUTION ORAL at 11:38

## 2019-04-20 RX ADMIN — Medication 20 MILLIEQUIVALENT(S): at 21:29

## 2019-04-20 RX ADMIN — FENTANYL CITRATE 25 MICROGRAM(S): 50 INJECTION INTRAVENOUS at 22:00

## 2019-04-20 RX ADMIN — Medication 200 GRAM(S): at 11:29

## 2019-04-20 RX ADMIN — OXYCODONE AND ACETAMINOPHEN 1 TABLET(S): 5; 325 TABLET ORAL at 14:56

## 2019-04-20 RX ADMIN — Medication 2000 MILLIGRAM(S): at 18:16

## 2019-04-20 RX ADMIN — OXYCODONE AND ACETAMINOPHEN 1 TABLET(S): 5; 325 TABLET ORAL at 22:30

## 2019-04-20 RX ADMIN — LEVALBUTEROL 0.63 MILLIGRAM(S): 1.25 SOLUTION, CONCENTRATE RESPIRATORY (INHALATION) at 17:21

## 2019-04-20 RX ADMIN — Medication 2000 MILLIGRAM(S): at 11:37

## 2019-04-20 RX ADMIN — ATORVASTATIN CALCIUM 80 MILLIGRAM(S): 80 TABLET, FILM COATED ORAL at 21:29

## 2019-04-20 RX ADMIN — Medication 20 MILLIGRAM(S): at 21:28

## 2019-04-20 RX ADMIN — Medication 15 MILLIGRAM(S): at 04:00

## 2019-04-20 RX ADMIN — Medication 1000 MILLIGRAM(S): at 10:30

## 2019-04-20 RX ADMIN — OXYCODONE AND ACETAMINOPHEN 1 TABLET(S): 5; 325 TABLET ORAL at 15:30

## 2019-04-20 RX ADMIN — HEPARIN SODIUM 5000 UNIT(S): 5000 INJECTION INTRAVENOUS; SUBCUTANEOUS at 22:38

## 2019-04-20 RX ADMIN — CHLORHEXIDINE GLUCONATE 5 MILLILITER(S): 213 SOLUTION TOPICAL at 07:15

## 2019-04-20 RX ADMIN — HYDROMORPHONE HYDROCHLORIDE 0.5 MILLIGRAM(S): 2 INJECTION INTRAMUSCULAR; INTRAVENOUS; SUBCUTANEOUS at 23:30

## 2019-04-20 RX ADMIN — CHLORHEXIDINE GLUCONATE 5 MILLILITER(S): 213 SOLUTION TOPICAL at 00:27

## 2019-04-20 RX ADMIN — Medication 15 MILLIGRAM(S): at 04:15

## 2019-04-20 RX ADMIN — CHLORHEXIDINE GLUCONATE 1 APPLICATION(S): 213 SOLUTION TOPICAL at 11:38

## 2019-04-20 RX ADMIN — PANTOPRAZOLE SODIUM 40 MILLIGRAM(S): 20 TABLET, DELAYED RELEASE ORAL at 07:45

## 2019-04-20 RX ADMIN — Medication 1000 MILLIGRAM(S): at 02:30

## 2019-04-20 RX ADMIN — FENTANYL CITRATE 25 MICROGRAM(S): 50 INJECTION INTRAVENOUS at 13:58

## 2019-04-20 RX ADMIN — Medication 0.25 MILLIGRAM(S): at 08:23

## 2019-04-20 RX ADMIN — Medication 15 MILLIGRAM(S): at 10:30

## 2019-04-20 RX ADMIN — Medication 125 MILLILITER(S): at 11:46

## 2019-04-20 RX ADMIN — Medication 15 MILLIGRAM(S): at 09:45

## 2019-04-20 RX ADMIN — Medication 20 MILLIGRAM(S): at 04:34

## 2019-04-20 RX ADMIN — Medication 400 MILLIGRAM(S): at 02:05

## 2019-04-20 RX ADMIN — Medication 12.5 MILLIGRAM(S): at 13:47

## 2019-04-20 RX ADMIN — OXYCODONE AND ACETAMINOPHEN 1 TABLET(S): 5; 325 TABLET ORAL at 21:29

## 2019-04-20 RX ADMIN — HEPARIN SODIUM 5000 UNIT(S): 5000 INJECTION INTRAVENOUS; SUBCUTANEOUS at 07:30

## 2019-04-20 RX ADMIN — Medication 40 MILLIEQUIVALENT(S): at 15:32

## 2019-04-20 RX ADMIN — FENTANYL CITRATE 25 MICROGRAM(S): 50 INJECTION INTRAVENOUS at 21:15

## 2019-04-20 RX ADMIN — PHENYLEPHRINE HYDROCHLORIDE 26.25 MICROGRAM(S)/KG/MIN: 10 INJECTION INTRAVENOUS at 00:26

## 2019-04-20 RX ADMIN — Medication 81 MILLIGRAM(S): at 11:37

## 2019-04-20 RX ADMIN — FENTANYL CITRATE 25 MICROGRAM(S): 50 INJECTION INTRAVENOUS at 13:20

## 2019-04-20 RX ADMIN — Medication 125 MILLILITER(S): at 18:44

## 2019-04-20 RX ADMIN — HYDROMORPHONE HYDROCHLORIDE 0.5 MILLIGRAM(S): 2 INJECTION INTRAMUSCULAR; INTRAVENOUS; SUBCUTANEOUS at 23:45

## 2019-04-20 RX ADMIN — Medication 400 MILLIGRAM(S): at 09:58

## 2019-04-20 RX ADMIN — Medication 12.5 MILLIGRAM(S): at 22:38

## 2019-04-20 NOTE — AIRWAY REMOVAL NOTE  ADULT & PEDS - ARTIFICAL AIRWAY REMOVAL COMMENTS
History of Present Illness: I have reviewed and appreciated the technician's history and test results as outlined above.      CC: 1 yr ret for complete eye exam.   Patient states w/out any vision changes or any eye problem,so far feels very good.     Slit Lamp exam:  LIDS, LASHES, LACRIMAL: OD (RIGHT EYE) // normal  OS (LEFT EYE): // normal    CONJUCTIVA OD (RIGHT EYE):// clear OS (LEFT EYE) :// clear    CORNEA:  OD (RIGHT EYE):// clear OS (LEFT EYE):// clear   ANTERIOR CHAMBER:  OD (RIGHT EYE):// deep and quiet OS (LEFT EYE): // deep and quiet    IRIS:  OD (RIGHT EYE)://round and regular without neovascularization OS (LEFT EYE): // round and regular without neovascularization   LENS:  OD (RIGHT EYE): // 1+ nuclear sclerosis OS (LEFT EYE): // 1+ nuclear sclerosis        Posterior Segment Exam:    Vitreous:  OD (Right eye): clear // OS (left eye): clear  Optic Nerve:  OD: flat, pink, healthy with + SVP and C/D 0.40 // OS: flat, pink, healthy with + SVP and C/D 0.40  Macula:  OU: healthy with sharp foveal reflex  , trace pigment changes ?  Vessels:  OD: healthy with normal A-V ratio // OS: healthy with normal A-V ratio  Periphery:  OD: flat, healthy without tears or retinal detachment // OS: flat, healthy without tears or retinal detachment    DX (DIAGNOSIS):  Cataract    TX (TREATMENT):  Copy of MR  Patient is generally getting along okay and will be monitored    Assessment:  GLAUCOMA SUSPECT: Based on Family History  mother  and brother      Plan:   Exam in 1 yr      Patient does have mild pigment changes in the macular area, recommend eye vitamins for macular degeneration patient also takes fresh avocado             per order, vss, post extubation gas wnl

## 2019-04-20 NOTE — PHYSICAL THERAPY INITIAL EVALUATION ADULT - GENERAL OBSERVATIONS, REHAB EVAL
Pt received seated, +TPM, +CT to suction (suction removed prior to ambulation by PETRONA Valentino,) +R radial a-line, +sternal incision bandage C/D/I, +RIJ TLC, +4L NC O2, +telemetry, +pulse ox, +alejandro, +b/l SCDs, NAD, agreeable to PT.

## 2019-04-20 NOTE — PHYSICAL THERAPY INITIAL EVALUATION ADULT - ACTIVE RANGE OF MOTION EXAMINATION, REHAB EVAL
bilateral  lower extremity Active ROM was WFL (within functional limits)/b/l UE AROM WFL with exception of shoulder flex/abd limited to 90 degrees secondary to sternal precautions

## 2019-04-20 NOTE — PHYSICAL THERAPY INITIAL EVALUATION ADULT - ADDITIONAL COMMENTS
Social history obtained via phone from pt's daughter. Pt lives with his friend in an apt with 1 flight of stairs to enter. At baseline, ambulates independently with no DME.

## 2019-04-20 NOTE — PROGRESS NOTE ADULT - SUBJECTIVE AND OBJECTIVE BOX
CTICU  CRITICAL  CARE  attending     Hand off received 					   Pertinent clinical, laboratory, radiographic, hemodynamic, echocardiographic, respiratory data, microbiologic data and chart were reviewed and analyzed frequently throughout the course of the day and night  Patient seen and examined with CTS/ SH attending at bedside  Pt is a 49y , Male, HEALTH ISSUES - PROBLEM Dx:  Leukocytosis: Leukocytosis  Unstable angina: Unstable angina      , FAMILY HISTORY:  Family history of myocardial infarction: Father (age unknown)  PAST MEDICAL & SURGICAL HISTORY:  No pertinent past medical history  H/O cardiac catheterization    Patient is a 49y old  Male who presents with a chief complaint of Unstable Angina (19 Apr 2019 22:26)      14 system review was unremarkable  acute changes include acute respiratory failure  Vital signs, hemodynamic and respiratory parameters were reviewed from the bedside nursing flowsheet.  ICU Vital Signs Last 24 Hrs  T(C): 37.3 (20 Apr 2019 18:05), Max: 37.5 (20 Apr 2019 14:00)  T(F): 99.1 (20 Apr 2019 18:05), Max: 99.5 (20 Apr 2019 14:00)  HR: 98 (20 Apr 2019 20:00) (86 - 110)  BP: 98/58 (20 Apr 2019 08:00) (86/58 - 108/67)  BP(mean): 71 (20 Apr 2019 08:00) (67 - 80)  ABP: 118/52 (20 Apr 2019 20:00) (90/52 - 148/68)  ABP(mean): 70 (20 Apr 2019 20:00) (62 - 90)  RR: 18 (20 Apr 2019 20:00) (16 - 24)  SpO2: 97% (20 Apr 2019 20:00) (91% - 100%)    Adult Advanced Hemodynamics Last 24 Hrs  CVP(mm Hg): 15 (20 Apr 2019 20:00) (7 - 28)  CVP(cm H2O): --  CO: --  CI: --  PA: --  PA(mean): --  PCWP: --  SVR: --  SVRI: --  PVR: --  PVRI: --, ABG - ( 20 Apr 2019 14:10 )  pH, Arterial: 7.46  pH, Blood: x     /  pCO2: 39    /  pO2: 79    / HCO3: 27    / Base Excess: 2.5   /  SaO2: 96                Mode: CPAP with PS  FiO2: 40  PEEP: 5  PS: 12  MAP: 8  PIP: 18    Intake and output was reviewed and the fluid balance was calculated  Daily     Daily   I&O's Summary    19 Apr 2019 07:01  -  20 Apr 2019 07:00  --------------------------------------------------------  IN: 2713.2 mL / OUT: 3305 mL / NET: -591.8 mL    20 Apr 2019 07:01  -  20 Apr 2019 20:08  --------------------------------------------------------  IN: 1320 mL / OUT: 685 mL / NET: 635 mL        All lines and drain sites were assessed  Glycemic trend was reviewedCAPILLARY BLOOD GLUCOSE      POCT Blood Glucose.: 121 mg/dL (20 Apr 2019 18:14)    No acute change in mental status  Auscultation of the chest reveals equal bs  Abdomen is soft  Extremities are warm and well perfused  Wounds appear clean and unremarkable  Antibiotics are periop    labs  CBC Full  -  ( 20 Apr 2019 14:08 )  WBC Count : 13.65 K/uL  RBC Count : 3.02 M/uL  Hemoglobin : 8.3 g/dL  Hematocrit : 26.1 %  Platelet Count - Automated : 220 K/uL  Mean Cell Volume : 86.4 fl  Mean Cell Hemoglobin : 27.5 pg  Mean Cell Hemoglobin Concentration : 31.8 gm/dL  Auto Neutrophil # : 9.58 K/uL  Auto Lymphocyte # : 2.62 K/uL  Auto Monocyte # : 1.39 K/uL  Auto Eosinophil # : 0.01 K/uL  Auto Basophil # : 0.01 K/uL  Auto Neutrophil % : 70.1 %  Auto Lymphocyte % : 19.2 %  Auto Monocyte % : 10.2 %  Auto Eosinophil % : 0.1 %  Auto Basophil % : 0.1 %    04-20    143  |  106  |  12  ----------------------------<  117<H>  3.9   |  30  |  0.82    Ca    8.8      20 Apr 2019 14:07  Phos  3.1     04-20  Mg     2.2     04-20    TPro  6.2  /  Alb  4.2  /  TBili  0.4  /  DBili  x   /  AST  43<H>  /  ALT  21  /  AlkPhos  45  04-20    PT/INR - ( 20 Apr 2019 14:08 )   PT: 18.4 sec;   INR: 1.60          PTT - ( 20 Apr 2019 14:08 )  PTT:34.5 sec  The current medications were reviewed   MEDICATIONS  (STANDING):  aspirin enteric coated 81 milliGRAM(s) Oral daily  atorvastatin 80 milliGRAM(s) Oral at bedtime  buDESOnide   0.25 milliGRAM(s) Respule 0.25 milliGRAM(s) Inhalation two times a day  ceFAZolin  Injectable. 2000 milliGRAM(s) IV Push every 8 hours  chlorhexidine 0.12% Liquid 5 milliLiter(s) Oral Mucosa every 4 hours  chlorhexidine 2% Cloths 1 Application(s) Topical daily  dextrose 5%. 1000 milliLiter(s) (50 mL/Hr) IV Continuous <Continuous>  dextrose 50% Injectable 50 milliLiter(s) IV Push every 15 minutes  dextrose 50% Injectable 25 milliLiter(s) IV Push every 15 minutes  heparin  Injectable 5000 Unit(s) SubCutaneous every 8 hours  insulin lispro (HumaLOG) corrective regimen sliding scale   SubCutaneous Before meals and at bedtime  levalbuterol Inhalation 0.63 milliGRAM(s) Inhalation every 6 hours  metoprolol tartrate 12.5 milliGRAM(s) Oral every 6 hours  pantoprazole    Tablet 40 milliGRAM(s) Oral before breakfast  phenylephrine    Infusion 1 MICROgram(s)/kG/Min (26.25 mL/Hr) IV Continuous <Continuous>  polyethylene glycol 3350 17 Gram(s) Oral daily  sodium chloride 0.9%. 1000 milliLiter(s) (10 mL/Hr) IV Continuous <Continuous>    MEDICATIONS  (PRN):  acetaminophen   Tablet .. 650 milliGRAM(s) Oral every 6 hours PRN Mild Pain (1 - 3)  dextrose 40% Gel 15 Gram(s) Oral once PRN Blood Glucose LESS THAN 70 milliGRAM(s)/deciliter  glucagon  Injectable 1 milliGRAM(s) IntraMuscular once PRN Glucose LESS THAN 70 milligrams/deciliter  oxyCODONE    5 mG/acetaminophen 325 mG 1 Tablet(s) Oral every 6 hours PRN Moderate Pain (4 - 6)       PROBLEM LIST/ ASSESSMENT:  HEALTH ISSUES - PROBLEM Dx:  Leukocytosis: Leukocytosis  Unstable angina: Unstable angina      ,   Patient is a 49y old  Male who presents with a chief complaint of Unstable Angina (19 Apr 2019 22:26)     s/p cardiac surgery      My plan includes :  close hemodynamic, ventilatory and drain monitoring and management per post op routine    Monitor for arrhythmias and monitor parameters for organ perfusion  beta blockade not administered due to hemodynamic instability and bradycardia  monitor neurologic status  Head of the bed should remain elevated to 45 deg .   chest PT and IS will be encouraged  monitor adequacy of oxygenation and ventilation and attempt to wean oxygen  antibiotic regimen will be tailored to the clinical, laboratory and microbiologic data  Nutritional goals will be met using po eventually , ensure adequate caloric intake and montior the same  Stress ulcer and VTE prophylaxis will be achieved    Glycemic control is satisfactory  Electrolytes have been repleted as necessary and wound care has been carried out. Pain control has been achieved.   agressive physical therapy and early mobility and ambulation goals will be met   The family was updated about the course and plan  CRITICAL CARE TIME SPENT in evaluation and management, reassessments, review and interpretation of labs and x-rays, ventilator and hemodynamic management, formulating a plan and coordinating care: ___90____ MIN.  Time does not include procedural time.  CTICU ATTENDING     					    Demarco Moya MD

## 2019-04-21 LAB
ANION GAP SERPL CALC-SCNC: 10 MMOL/L — SIGNIFICANT CHANGE UP (ref 5–17)
ANION GAP SERPL CALC-SCNC: 12 MMOL/L — SIGNIFICANT CHANGE UP (ref 5–17)
ANION GAP SERPL CALC-SCNC: 7 MMOL/L — SIGNIFICANT CHANGE UP (ref 5–17)
ANION GAP SERPL CALC-SCNC: 8 MMOL/L — SIGNIFICANT CHANGE UP (ref 5–17)
APTT BLD: 34.1 SEC — SIGNIFICANT CHANGE UP (ref 27.5–36.3)
BUN SERPL-MCNC: 13 MG/DL — SIGNIFICANT CHANGE UP (ref 7–23)
BUN SERPL-MCNC: 14 MG/DL — SIGNIFICANT CHANGE UP (ref 7–23)
BUN SERPL-MCNC: 15 MG/DL — SIGNIFICANT CHANGE UP (ref 7–23)
BUN SERPL-MCNC: 15 MG/DL — SIGNIFICANT CHANGE UP (ref 7–23)
CALCIUM SERPL-MCNC: 8.7 MG/DL — SIGNIFICANT CHANGE UP (ref 8.4–10.5)
CALCIUM SERPL-MCNC: 8.7 MG/DL — SIGNIFICANT CHANGE UP (ref 8.4–10.5)
CALCIUM SERPL-MCNC: 8.8 MG/DL — SIGNIFICANT CHANGE UP (ref 8.4–10.5)
CALCIUM SERPL-MCNC: 9.2 MG/DL — SIGNIFICANT CHANGE UP (ref 8.4–10.5)
CHLORIDE SERPL-SCNC: 102 MMOL/L — SIGNIFICANT CHANGE UP (ref 96–108)
CHLORIDE SERPL-SCNC: 103 MMOL/L — SIGNIFICANT CHANGE UP (ref 96–108)
CHLORIDE SERPL-SCNC: 104 MMOL/L — SIGNIFICANT CHANGE UP (ref 96–108)
CHLORIDE SERPL-SCNC: 97 MMOL/L — SIGNIFICANT CHANGE UP (ref 96–108)
CO2 SERPL-SCNC: 28 MMOL/L — SIGNIFICANT CHANGE UP (ref 22–31)
CO2 SERPL-SCNC: 29 MMOL/L — SIGNIFICANT CHANGE UP (ref 22–31)
CO2 SERPL-SCNC: 31 MMOL/L — SIGNIFICANT CHANGE UP (ref 22–31)
CO2 SERPL-SCNC: 31 MMOL/L — SIGNIFICANT CHANGE UP (ref 22–31)
CREAT SERPL-MCNC: 0.91 MG/DL — SIGNIFICANT CHANGE UP (ref 0.5–1.3)
CREAT SERPL-MCNC: 0.91 MG/DL — SIGNIFICANT CHANGE UP (ref 0.5–1.3)
CREAT SERPL-MCNC: 0.96 MG/DL — SIGNIFICANT CHANGE UP (ref 0.5–1.3)
CREAT SERPL-MCNC: 0.97 MG/DL — SIGNIFICANT CHANGE UP (ref 0.5–1.3)
GAS PNL BLDA: SIGNIFICANT CHANGE UP
GLUCOSE BLDC GLUCOMTR-MCNC: 101 MG/DL — HIGH (ref 70–99)
GLUCOSE BLDC GLUCOMTR-MCNC: 179 MG/DL — HIGH (ref 70–99)
GLUCOSE BLDC GLUCOMTR-MCNC: 182 MG/DL — HIGH (ref 70–99)
GLUCOSE BLDC GLUCOMTR-MCNC: 188 MG/DL — HIGH (ref 70–99)
GLUCOSE SERPL-MCNC: 100 MG/DL — HIGH (ref 70–99)
GLUCOSE SERPL-MCNC: 105 MG/DL — HIGH (ref 70–99)
GLUCOSE SERPL-MCNC: 117 MG/DL — HIGH (ref 70–99)
GLUCOSE SERPL-MCNC: 189 MG/DL — HIGH (ref 70–99)
HCT VFR BLD CALC: 25.4 % — LOW (ref 39–50)
HCT VFR BLD CALC: 26.1 % — LOW (ref 39–50)
HCT VFR BLD CALC: 29.3 % — LOW (ref 39–50)
HGB BLD-MCNC: 8 G/DL — LOW (ref 13–17)
HGB BLD-MCNC: 8.2 G/DL — LOW (ref 13–17)
HGB BLD-MCNC: 9.5 G/DL — LOW (ref 13–17)
INR BLD: 1.37 — HIGH (ref 0.88–1.16)
MAGNESIUM SERPL-MCNC: 2.1 MG/DL — SIGNIFICANT CHANGE UP (ref 1.6–2.6)
MAGNESIUM SERPL-MCNC: 2.2 MG/DL — SIGNIFICANT CHANGE UP (ref 1.6–2.6)
MCHC RBC-ENTMCNC: 27.6 PG — SIGNIFICANT CHANGE UP (ref 27–34)
MCHC RBC-ENTMCNC: 27.8 PG — SIGNIFICANT CHANGE UP (ref 27–34)
MCHC RBC-ENTMCNC: 28.3 PG — SIGNIFICANT CHANGE UP (ref 27–34)
MCHC RBC-ENTMCNC: 31.4 GM/DL — LOW (ref 32–36)
MCHC RBC-ENTMCNC: 31.5 GM/DL — LOW (ref 32–36)
MCHC RBC-ENTMCNC: 32.4 GM/DL — SIGNIFICANT CHANGE UP (ref 32–36)
MCV RBC AUTO: 87.2 FL — SIGNIFICANT CHANGE UP (ref 80–100)
MCV RBC AUTO: 87.9 FL — SIGNIFICANT CHANGE UP (ref 80–100)
MCV RBC AUTO: 88.2 FL — SIGNIFICANT CHANGE UP (ref 80–100)
NRBC # BLD: 0 /100 WBCS — SIGNIFICANT CHANGE UP (ref 0–0)
PHOSPHATE SERPL-MCNC: 1.9 MG/DL — LOW (ref 2.5–4.5)
PHOSPHATE SERPL-MCNC: 2 MG/DL — LOW (ref 2.5–4.5)
PHOSPHATE SERPL-MCNC: 2.6 MG/DL — SIGNIFICANT CHANGE UP (ref 2.5–4.5)
PHOSPHATE SERPL-MCNC: 2.7 MG/DL — SIGNIFICANT CHANGE UP (ref 2.5–4.5)
PLATELET # BLD AUTO: 200 K/UL — SIGNIFICANT CHANGE UP (ref 150–400)
PLATELET # BLD AUTO: 202 K/UL — SIGNIFICANT CHANGE UP (ref 150–400)
PLATELET # BLD AUTO: 212 K/UL — SIGNIFICANT CHANGE UP (ref 150–400)
POTASSIUM SERPL-MCNC: 3.9 MMOL/L — SIGNIFICANT CHANGE UP (ref 3.5–5.3)
POTASSIUM SERPL-MCNC: 4.2 MMOL/L — SIGNIFICANT CHANGE UP (ref 3.5–5.3)
POTASSIUM SERPL-MCNC: 4.4 MMOL/L — SIGNIFICANT CHANGE UP (ref 3.5–5.3)
POTASSIUM SERPL-MCNC: 4.5 MMOL/L — SIGNIFICANT CHANGE UP (ref 3.5–5.3)
POTASSIUM SERPL-SCNC: 3.9 MMOL/L — SIGNIFICANT CHANGE UP (ref 3.5–5.3)
POTASSIUM SERPL-SCNC: 4.2 MMOL/L — SIGNIFICANT CHANGE UP (ref 3.5–5.3)
POTASSIUM SERPL-SCNC: 4.4 MMOL/L — SIGNIFICANT CHANGE UP (ref 3.5–5.3)
POTASSIUM SERPL-SCNC: 4.5 MMOL/L — SIGNIFICANT CHANGE UP (ref 3.5–5.3)
PROTHROM AB SERPL-ACNC: 15.6 SEC — HIGH (ref 10–12.9)
RBC # BLD: 2.88 M/UL — LOW (ref 4.2–5.8)
RBC # BLD: 2.97 M/UL — LOW (ref 4.2–5.8)
RBC # BLD: 3.36 M/UL — LOW (ref 4.2–5.8)
RBC # FLD: 14.7 % — HIGH (ref 10.3–14.5)
RBC # FLD: 15.2 % — HIGH (ref 10.3–14.5)
RBC # FLD: 15.3 % — HIGH (ref 10.3–14.5)
SODIUM SERPL-SCNC: 138 MMOL/L — SIGNIFICANT CHANGE UP (ref 135–145)
SODIUM SERPL-SCNC: 141 MMOL/L — SIGNIFICANT CHANGE UP (ref 135–145)
SODIUM SERPL-SCNC: 141 MMOL/L — SIGNIFICANT CHANGE UP (ref 135–145)
SODIUM SERPL-SCNC: 142 MMOL/L — SIGNIFICANT CHANGE UP (ref 135–145)
WBC # BLD: 14.26 K/UL — HIGH (ref 3.8–10.5)
WBC # BLD: 14.73 K/UL — HIGH (ref 3.8–10.5)
WBC # BLD: 16.67 K/UL — HIGH (ref 3.8–10.5)
WBC # FLD AUTO: 14.26 K/UL — HIGH (ref 3.8–10.5)
WBC # FLD AUTO: 14.73 K/UL — HIGH (ref 3.8–10.5)
WBC # FLD AUTO: 16.67 K/UL — HIGH (ref 3.8–10.5)

## 2019-04-21 PROCEDURE — 99291 CRITICAL CARE FIRST HOUR: CPT

## 2019-04-21 PROCEDURE — 71045 X-RAY EXAM CHEST 1 VIEW: CPT | Mod: 26

## 2019-04-21 PROCEDURE — 71045 X-RAY EXAM CHEST 1 VIEW: CPT | Mod: 26,77

## 2019-04-21 RX ORDER — IPRATROPIUM BROMIDE 0.2 MG/ML
1 SOLUTION, NON-ORAL INHALATION ONCE
Qty: 0 | Refills: 0 | Status: COMPLETED | OUTPATIENT
Start: 2019-04-21 | End: 2019-04-21

## 2019-04-21 RX ORDER — FUROSEMIDE 40 MG
20 TABLET ORAL ONCE
Qty: 0 | Refills: 0 | Status: COMPLETED | OUTPATIENT
Start: 2019-04-21 | End: 2019-04-21

## 2019-04-21 RX ORDER — POTASSIUM CHLORIDE 20 MEQ
20 PACKET (EA) ORAL ONCE
Qty: 0 | Refills: 0 | Status: COMPLETED | OUTPATIENT
Start: 2019-04-21 | End: 2019-04-21

## 2019-04-21 RX ORDER — DILTIAZEM HCL 120 MG
30 CAPSULE, EXT RELEASE 24 HR ORAL EVERY 8 HOURS
Qty: 0 | Refills: 0 | Status: DISCONTINUED | OUTPATIENT
Start: 2019-04-21 | End: 2019-04-21

## 2019-04-21 RX ORDER — POTASSIUM PHOSPHATE, MONOBASIC POTASSIUM PHOSPHATE, DIBASIC 236; 224 MG/ML; MG/ML
15 INJECTION, SOLUTION INTRAVENOUS ONCE
Qty: 0 | Refills: 0 | Status: COMPLETED | OUTPATIENT
Start: 2019-04-21 | End: 2019-04-21

## 2019-04-21 RX ORDER — CALCIUM GLUCONATE 100 MG/ML
2 VIAL (ML) INTRAVENOUS ONCE
Qty: 0 | Refills: 0 | Status: COMPLETED | OUTPATIENT
Start: 2019-04-21 | End: 2019-04-21

## 2019-04-21 RX ORDER — METOPROLOL TARTRATE 50 MG
25 TABLET ORAL EVERY 6 HOURS
Qty: 0 | Refills: 0 | Status: DISCONTINUED | OUTPATIENT
Start: 2019-04-21 | End: 2019-04-21

## 2019-04-21 RX ORDER — BUDESONIDE, MICRONIZED 100 %
0.5 POWDER (GRAM) MISCELLANEOUS EVERY 12 HOURS
Qty: 0 | Refills: 0 | Status: DISCONTINUED | OUTPATIENT
Start: 2019-04-21 | End: 2019-04-21

## 2019-04-21 RX ORDER — ALBUMIN HUMAN 25 %
250 VIAL (ML) INTRAVENOUS ONCE
Qty: 0 | Refills: 0 | Status: COMPLETED | OUTPATIENT
Start: 2019-04-21 | End: 2019-04-21

## 2019-04-21 RX ORDER — OXYCODONE AND ACETAMINOPHEN 5; 325 MG/1; MG/1
1 TABLET ORAL ONCE
Qty: 0 | Refills: 0 | Status: DISCONTINUED | OUTPATIENT
Start: 2019-04-21 | End: 2019-04-21

## 2019-04-21 RX ORDER — LEVALBUTEROL 1.25 MG/.5ML
0.63 SOLUTION, CONCENTRATE RESPIRATORY (INHALATION) EVERY 4 HOURS
Qty: 0 | Refills: 0 | Status: COMPLETED | OUTPATIENT
Start: 2019-04-21 | End: 2019-04-23

## 2019-04-21 RX ORDER — IPRATROPIUM BROMIDE 0.2 MG/ML
500 SOLUTION, NON-ORAL INHALATION EVERY 6 HOURS
Qty: 0 | Refills: 0 | Status: DISCONTINUED | OUTPATIENT
Start: 2019-04-21 | End: 2019-04-27

## 2019-04-21 RX ORDER — NICOTINE POLACRILEX 2 MG
1 GUM BUCCAL DAILY
Qty: 0 | Refills: 0 | Status: DISCONTINUED | OUTPATIENT
Start: 2019-04-21 | End: 2019-04-27

## 2019-04-21 RX ORDER — BUDESONIDE AND FORMOTEROL FUMARATE DIHYDRATE 160; 4.5 UG/1; UG/1
2 AEROSOL RESPIRATORY (INHALATION)
Qty: 0 | Refills: 0 | Status: DISCONTINUED | OUTPATIENT
Start: 2019-04-21 | End: 2019-04-21

## 2019-04-21 RX ORDER — BUDESONIDE, MICRONIZED 100 %
0.5 POWDER (GRAM) MISCELLANEOUS
Qty: 0 | Refills: 0 | Status: DISCONTINUED | OUTPATIENT
Start: 2019-04-21 | End: 2019-04-27

## 2019-04-21 RX ORDER — DILTIAZEM HCL 120 MG
60 CAPSULE, EXT RELEASE 24 HR ORAL EVERY 8 HOURS
Qty: 0 | Refills: 0 | Status: DISCONTINUED | OUTPATIENT
Start: 2019-04-21 | End: 2019-04-24

## 2019-04-21 RX ORDER — OXYCODONE AND ACETAMINOPHEN 5; 325 MG/1; MG/1
2 TABLET ORAL AT BEDTIME
Qty: 0 | Refills: 0 | Status: DISCONTINUED | OUTPATIENT
Start: 2019-04-21 | End: 2019-04-21

## 2019-04-21 RX ORDER — ACETAMINOPHEN 500 MG
1000 TABLET ORAL ONCE
Qty: 0 | Refills: 0 | Status: COMPLETED | OUTPATIENT
Start: 2019-04-21 | End: 2019-04-22

## 2019-04-21 RX ADMIN — HEPARIN SODIUM 5000 UNIT(S): 5000 INJECTION INTRAVENOUS; SUBCUTANEOUS at 13:19

## 2019-04-21 RX ADMIN — Medication 100 MILLIEQUIVALENT(S): at 18:43

## 2019-04-21 RX ADMIN — OXYCODONE AND ACETAMINOPHEN 1 TABLET(S): 5; 325 TABLET ORAL at 17:12

## 2019-04-21 RX ADMIN — OXYCODONE AND ACETAMINOPHEN 2 TABLET(S): 5; 325 TABLET ORAL at 21:20

## 2019-04-21 RX ADMIN — Medication 25 MILLIGRAM(S): at 05:29

## 2019-04-21 RX ADMIN — Medication 0.25 MILLIGRAM(S): at 08:13

## 2019-04-21 RX ADMIN — HEPARIN SODIUM 5000 UNIT(S): 5000 INJECTION INTRAVENOUS; SUBCUTANEOUS at 05:29

## 2019-04-21 RX ADMIN — ATORVASTATIN CALCIUM 80 MILLIGRAM(S): 80 TABLET, FILM COATED ORAL at 21:20

## 2019-04-21 RX ADMIN — PANTOPRAZOLE SODIUM 40 MILLIGRAM(S): 20 TABLET, DELAYED RELEASE ORAL at 06:52

## 2019-04-21 RX ADMIN — LEVALBUTEROL 0.63 MILLIGRAM(S): 1.25 SOLUTION, CONCENTRATE RESPIRATORY (INHALATION) at 20:02

## 2019-04-21 RX ADMIN — Medication 81 MILLIGRAM(S): at 11:06

## 2019-04-21 RX ADMIN — BUDESONIDE AND FORMOTEROL FUMARATE DIHYDRATE 2 PUFF(S): 160; 4.5 AEROSOL RESPIRATORY (INHALATION) at 20:00

## 2019-04-21 RX ADMIN — Medication 2: at 08:13

## 2019-04-21 RX ADMIN — LEVALBUTEROL 0.63 MILLIGRAM(S): 1.25 SOLUTION, CONCENTRATE RESPIRATORY (INHALATION) at 06:59

## 2019-04-21 RX ADMIN — Medication 1 PATCH: at 20:55

## 2019-04-21 RX ADMIN — Medication 2: at 17:19

## 2019-04-21 RX ADMIN — Medication 20 MILLIGRAM(S): at 15:07

## 2019-04-21 RX ADMIN — SODIUM CHLORIDE 10 MILLILITER(S): 9 INJECTION INTRAMUSCULAR; INTRAVENOUS; SUBCUTANEOUS at 04:48

## 2019-04-21 RX ADMIN — OXYCODONE AND ACETAMINOPHEN 1 TABLET(S): 5; 325 TABLET ORAL at 12:07

## 2019-04-21 RX ADMIN — POTASSIUM PHOSPHATE, MONOBASIC POTASSIUM PHOSPHATE, DIBASIC 62.5 MILLIMOLE(S): 236; 224 INJECTION, SOLUTION INTRAVENOUS at 20:59

## 2019-04-21 RX ADMIN — Medication 1 PUFF(S): at 17:30

## 2019-04-21 RX ADMIN — Medication 30 MILLIGRAM(S): at 13:19

## 2019-04-21 RX ADMIN — OXYCODONE AND ACETAMINOPHEN 1 TABLET(S): 5; 325 TABLET ORAL at 11:05

## 2019-04-21 RX ADMIN — OXYCODONE AND ACETAMINOPHEN 2 TABLET(S): 5; 325 TABLET ORAL at 22:48

## 2019-04-21 RX ADMIN — OXYCODONE AND ACETAMINOPHEN 1 TABLET(S): 5; 325 TABLET ORAL at 16:29

## 2019-04-21 RX ADMIN — Medication 25 MILLIGRAM(S): at 11:05

## 2019-04-21 RX ADMIN — OXYCODONE AND ACETAMINOPHEN 1 TABLET(S): 5; 325 TABLET ORAL at 05:28

## 2019-04-21 RX ADMIN — Medication 2000 MILLIGRAM(S): at 03:49

## 2019-04-21 RX ADMIN — Medication 60 MILLIGRAM(S): at 17:11

## 2019-04-21 RX ADMIN — OXYCODONE AND ACETAMINOPHEN 1 TABLET(S): 5; 325 TABLET ORAL at 06:30

## 2019-04-21 RX ADMIN — Medication 125 MILLILITER(S): at 11:00

## 2019-04-21 RX ADMIN — Medication 20 MILLIGRAM(S): at 09:30

## 2019-04-21 RX ADMIN — LEVALBUTEROL 0.63 MILLIGRAM(S): 1.25 SOLUTION, CONCENTRATE RESPIRATORY (INHALATION) at 16:25

## 2019-04-21 RX ADMIN — LEVALBUTEROL 0.63 MILLIGRAM(S): 1.25 SOLUTION, CONCENTRATE RESPIRATORY (INHALATION) at 09:46

## 2019-04-21 RX ADMIN — Medication 60 MILLIGRAM(S): at 11:00

## 2019-04-21 RX ADMIN — HEPARIN SODIUM 5000 UNIT(S): 5000 INJECTION INTRAVENOUS; SUBCUTANEOUS at 21:19

## 2019-04-21 RX ADMIN — Medication 20 MILLIGRAM(S): at 17:30

## 2019-04-21 RX ADMIN — Medication 2: at 22:46

## 2019-04-21 RX ADMIN — Medication 20 MILLIGRAM(S): at 06:52

## 2019-04-21 NOTE — PROGRESS NOTE ADULT - SUBJECTIVE AND OBJECTIVE BOX
CTICU  CRITICAL  CARE  attending     Hand off received 					   Pertinent clinical, laboratory, radiographic, hemodynamic, echocardiographic, respiratory data, microbiologic data and chart were reviewed and analyzed frequently throughout the course of the day and night  Patient seen and examined with CTS/ SH attending at bedside    Pt is a 49y , Male, post op day # 2 s/p CABG x 4    intraop:    hemodynamic instability  s/p 1 unit PRBC    today:    acute post H'gic anemia  s/p 1 unit PC  hypoxemia/wheezing requiring supplemental O2 via HFNC  diuresis    Leukocytosis: Leukocytosis  Unstable angina: Unstable angina      , FAMILY HISTORY:  Family history of myocardial infarction: Father (age unknown)  PAST MEDICAL & SURGICAL HISTORY:  No pertinent past medical history  H/O cardiac catheterization    Patient is a 49y old  Male who presents with a chief complaint of Unstable Angina (20 Apr 2019 20:08)      14 system review was unremarkable  acute changes include acute respiratory failure  Vital signs, hemodynamic and respiratory parameters were reviewed from the bedside nursing flowsheet.  ICU Vital Signs Last 24 Hrs  T(C): 37.4 (21 Apr 2019 16:43), Max: 37.4 (21 Apr 2019 16:43)  T(F): 99.4 (21 Apr 2019 16:43), Max: 99.4 (21 Apr 2019 16:43)  HR: 108 (21 Apr 2019 17:00) (92 - 118)  BP: 119/75 (21 Apr 2019 16:00) (71/55 - 119/75)  BP(mean): 87 (21 Apr 2019 16:00) (60 - 87)  ABP: 132/56 (21 Apr 2019 17:00) (90/44 - 162/54)  ABP(mean): 76 (21 Apr 2019 17:00) (58 - 102)  RR: 26 (21 Apr 2019 17:00) (18 - 26)  SpO2: 90% (21 Apr 2019 17:00) (89% - 100%)    Adult Advanced Hemodynamics Last 24 Hrs  CVP(mm Hg): 17 (21 Apr 2019 07:00) (14 - 20)  CVP(cm H2O): --  CO: --  CI: --  PA: --  PA(mean): --  PCWP: --  SVR: --  SVRI: --  PVR: --  PVRI: --, ABG - ( 21 Apr 2019 12:12 )  pH, Arterial: 7.49  pH, Blood: x     /  pCO2: 43    /  pO2: 68    / HCO3: 32    / Base Excess: 7.8   /  SaO2: 94                  Intake and output was reviewed and the fluid balance was calculated  Daily     Daily   I&O's Summary    20 Apr 2019 07:01  -  21 Apr 2019 07:00  --------------------------------------------------------  IN: 1440 mL / OUT: 2085 mL / NET: -645 mL    21 Apr 2019 07:01  -  21 Apr 2019 17:27  --------------------------------------------------------  IN: 710 mL / OUT: 1670 mL / NET: -960 mL        All lines and drain sites were assessed  Glycemic trend was reviewedCAPILLARY BLOOD GLUCOSE      POCT Blood Glucose.: 179 mg/dL (21 Apr 2019 16:22)    No acute change in mental status  Auscultation of the chest reveals equal bs  Abdomen is soft  Extremities are warm and well perfused  Wounds appear clean and unremarkable  Antibiotics are periop    labs  CBC Full  -  ( 21 Apr 2019 12:23 )  WBC Count : 14.26 K/uL  RBC Count : 2.97 M/uL  Hemoglobin : 8.2 g/dL  Hematocrit : 26.1 %  Platelet Count - Automated : 212 K/uL  Mean Cell Volume : 87.9 fl  Mean Cell Hemoglobin : 27.6 pg  Mean Cell Hemoglobin Concentration : 31.4 gm/dL  Auto Neutrophil # : x  Auto Lymphocyte # : x  Auto Monocyte # : x  Auto Eosinophil # : x  Auto Basophil # : x  Auto Neutrophil % : x  Auto Lymphocyte % : x  Auto Monocyte % : x  Auto Eosinophil % : x  Auto Basophil % : x    04-21    141  |  103  |  15  ----------------------------<  100<H>  4.5   |  28  |  0.97    Ca    8.7      21 Apr 2019 12:23  Phos  2.0     04-21  Mg     2.2     04-21    TPro  6.2  /  Alb  4.2  /  TBili  0.4  /  DBili  x   /  AST  43<H>  /  ALT  21  /  AlkPhos  45  04-20    PT/INR - ( 21 Apr 2019 12:23 )   PT: 15.6 sec;   INR: 1.37          PTT - ( 21 Apr 2019 12:23 )  PTT:34.1 sec  The current medications were reviewed   MEDICATIONS  (STANDING):  aspirin enteric coated 81 milliGRAM(s) Oral daily  atorvastatin 80 milliGRAM(s) Oral at bedtime  buDESOnide 160 MICROgram(s)/formoterol 4.5 MICROgram(s) Inhaler 2 Puff(s) Inhalation two times a day  chlorhexidine 2% Cloths 1 Application(s) Topical daily  dextrose 5%. 1000 milliLiter(s) (50 mL/Hr) IV Continuous <Continuous>  dextrose 50% Injectable 50 milliLiter(s) IV Push every 15 minutes  dextrose 50% Injectable 25 milliLiter(s) IV Push every 15 minutes  diltiazem    Tablet 60 milliGRAM(s) Oral every 8 hours  heparin  Injectable 5000 Unit(s) SubCutaneous every 8 hours  insulin lispro (HumaLOG) corrective regimen sliding scale   SubCutaneous Before meals and at bedtime  levalbuterol Inhalation 0.63 milliGRAM(s) Inhalation every 4 hours  pantoprazole    Tablet 40 milliGRAM(s) Oral before breakfast  polyethylene glycol 3350 17 Gram(s) Oral daily  sodium chloride 0.9%. 1000 milliLiter(s) (10 mL/Hr) IV Continuous <Continuous>    MEDICATIONS  (PRN):  acetaminophen   Tablet .. 650 milliGRAM(s) Oral every 6 hours PRN Mild Pain (1 - 3)  dextrose 40% Gel 15 Gram(s) Oral once PRN Blood Glucose LESS THAN 70 milliGRAM(s)/deciliter  glucagon  Injectable 1 milliGRAM(s) IntraMuscular once PRN Glucose LESS THAN 70 milligrams/deciliter  oxyCODONE    5 mG/acetaminophen 325 mG 1 Tablet(s) Oral every 6 hours PRN Moderate Pain (4 - 6)       PROBLEM LIST/ ASSESSMENT:  HEALTH ISSUES - PROBLEM Dx:  hemodynamic instability  s/p CABG  postoperative hypoxemia  acute post H'gic anemia  Leukocytosis: Leukocytosis  Unstable angina: Unstable angina        ,   Patient is a 49y old  Male who presents with a chief complaint of Unstable Angina (20 Apr 2019 20:08)     s/p CABG      My plan includes :  close hemodynamic, ventilatory and drain monitoring and management per post op routine    Monitor for arrhythmias and monitor parameters for organ perfusion  monitor neurologic status  Head of the bed should remain elevated to 45 deg .   chest PT and IS will be encouraged  monitor adequacy of oxygenation and ventilation and attempt to wean oxygen  Nutritional goals will be met using po eventually , ensure adequate caloric intake and montior the same  Stress ulcer and VTE prophylaxis will be achieved    Glycemic control is satisfactory  Electrolytes have been repleted as necessary and wound care has been carried out. Pain control has been achieved.   agressive physical therapy and early mobility and ambulation goals will be met   The family was updated about the course and plan  CRITICAL CARE TIME SPENT in evaluation and management, reassessments, review and interpretation of labs and x-rays, ventilator and hemodynamic management, formulating a plan and coordinating care: __55___ MIN.  Time does not include procedural time.  CTICU ATTENDING     					    Niko Cardenas MD

## 2019-04-22 DIAGNOSIS — J40 BRONCHITIS, NOT SPECIFIED AS ACUTE OR CHRONIC: ICD-10-CM

## 2019-04-22 DIAGNOSIS — J43.2 CENTRILOBULAR EMPHYSEMA: ICD-10-CM

## 2019-04-22 LAB
ALBUMIN SERPL ELPH-MCNC: 3.7 G/DL — SIGNIFICANT CHANGE UP (ref 3.3–5)
ALP SERPL-CCNC: 90 U/L — SIGNIFICANT CHANGE UP (ref 40–120)
ALT FLD-CCNC: 19 U/L — SIGNIFICANT CHANGE UP (ref 10–45)
ANION GAP SERPL CALC-SCNC: 10 MMOL/L — SIGNIFICANT CHANGE UP (ref 5–17)
ANION GAP SERPL CALC-SCNC: 11 MMOL/L — SIGNIFICANT CHANGE UP (ref 5–17)
ANION GAP SERPL CALC-SCNC: 13 MMOL/L — SIGNIFICANT CHANGE UP (ref 5–17)
APTT BLD: 31.5 SEC — SIGNIFICANT CHANGE UP (ref 27.5–36.3)
APTT BLD: 31.8 SEC — SIGNIFICANT CHANGE UP (ref 27.5–36.3)
APTT BLD: 37.3 SEC — HIGH (ref 27.5–36.3)
AST SERPL-CCNC: 24 U/L — SIGNIFICANT CHANGE UP (ref 10–40)
BILIRUB SERPL-MCNC: 0.5 MG/DL — SIGNIFICANT CHANGE UP (ref 0.2–1.2)
BUN SERPL-MCNC: 15 MG/DL — SIGNIFICANT CHANGE UP (ref 7–23)
BUN SERPL-MCNC: 16 MG/DL — SIGNIFICANT CHANGE UP (ref 7–23)
BUN SERPL-MCNC: 16 MG/DL — SIGNIFICANT CHANGE UP (ref 7–23)
CALCIUM SERPL-MCNC: 9.1 MG/DL — SIGNIFICANT CHANGE UP (ref 8.4–10.5)
CHLORIDE SERPL-SCNC: 100 MMOL/L — SIGNIFICANT CHANGE UP (ref 96–108)
CHLORIDE SERPL-SCNC: 102 MMOL/L — SIGNIFICANT CHANGE UP (ref 96–108)
CHLORIDE SERPL-SCNC: 98 MMOL/L — SIGNIFICANT CHANGE UP (ref 96–108)
CO2 SERPL-SCNC: 28 MMOL/L — SIGNIFICANT CHANGE UP (ref 22–31)
CREAT SERPL-MCNC: 0.73 MG/DL — SIGNIFICANT CHANGE UP (ref 0.5–1.3)
CREAT SERPL-MCNC: 0.74 MG/DL — SIGNIFICANT CHANGE UP (ref 0.5–1.3)
CREAT SERPL-MCNC: 0.87 MG/DL — SIGNIFICANT CHANGE UP (ref 0.5–1.3)
GAS PNL BLDA: SIGNIFICANT CHANGE UP
GAS PNL BLDA: SIGNIFICANT CHANGE UP
GLUCOSE BLDC GLUCOMTR-MCNC: 105 MG/DL — HIGH (ref 70–99)
GLUCOSE BLDC GLUCOMTR-MCNC: 189 MG/DL — HIGH (ref 70–99)
GLUCOSE BLDC GLUCOMTR-MCNC: 251 MG/DL — HIGH (ref 70–99)
GLUCOSE SERPL-MCNC: 135 MG/DL — HIGH (ref 70–99)
GLUCOSE SERPL-MCNC: 151 MG/DL — HIGH (ref 70–99)
GLUCOSE SERPL-MCNC: 183 MG/DL — HIGH (ref 70–99)
HCT VFR BLD CALC: 26.9 % — LOW (ref 39–50)
HCT VFR BLD CALC: 27.7 % — LOW (ref 39–50)
HCT VFR BLD CALC: 27.9 % — LOW (ref 39–50)
HGB BLD-MCNC: 8.8 G/DL — LOW (ref 13–17)
HGB BLD-MCNC: 8.9 G/DL — LOW (ref 13–17)
HGB BLD-MCNC: 9.1 G/DL — LOW (ref 13–17)
INR BLD: 1.28 — HIGH (ref 0.88–1.16)
INR BLD: 1.3 — HIGH (ref 0.88–1.16)
INR BLD: 1.35 — HIGH (ref 0.88–1.16)
LACTATE SERPL-SCNC: 1.1 MMOL/L — SIGNIFICANT CHANGE UP (ref 0.5–2)
MAGNESIUM SERPL-MCNC: 2.3 MG/DL — SIGNIFICANT CHANGE UP (ref 1.6–2.6)
MAGNESIUM SERPL-MCNC: 2.3 MG/DL — SIGNIFICANT CHANGE UP (ref 1.6–2.6)
MAGNESIUM SERPL-MCNC: 2.5 MG/DL — SIGNIFICANT CHANGE UP (ref 1.6–2.6)
MCHC RBC-ENTMCNC: 27.8 PG — SIGNIFICANT CHANGE UP (ref 27–34)
MCHC RBC-ENTMCNC: 28.5 PG — SIGNIFICANT CHANGE UP (ref 27–34)
MCHC RBC-ENTMCNC: 28.8 PG — SIGNIFICANT CHANGE UP (ref 27–34)
MCHC RBC-ENTMCNC: 31.8 GM/DL — LOW (ref 32–36)
MCHC RBC-ENTMCNC: 32.6 GM/DL — SIGNIFICANT CHANGE UP (ref 32–36)
MCHC RBC-ENTMCNC: 33.1 GM/DL — SIGNIFICANT CHANGE UP (ref 32–36)
MCV RBC AUTO: 87.1 FL — SIGNIFICANT CHANGE UP (ref 80–100)
MCV RBC AUTO: 87.4 FL — SIGNIFICANT CHANGE UP (ref 80–100)
MCV RBC AUTO: 87.5 FL — SIGNIFICANT CHANGE UP (ref 80–100)
NRBC # BLD: 0 /100 WBCS — SIGNIFICANT CHANGE UP (ref 0–0)
PHOSPHATE SERPL-MCNC: 2.3 MG/DL — LOW (ref 2.5–4.5)
PHOSPHATE SERPL-MCNC: 2.6 MG/DL — SIGNIFICANT CHANGE UP (ref 2.5–4.5)
PHOSPHATE SERPL-MCNC: 2.7 MG/DL — SIGNIFICANT CHANGE UP (ref 2.5–4.5)
PLATELET # BLD AUTO: 194 K/UL — SIGNIFICANT CHANGE UP (ref 150–400)
PLATELET # BLD AUTO: 201 K/UL — SIGNIFICANT CHANGE UP (ref 150–400)
PLATELET # BLD AUTO: 221 K/UL — SIGNIFICANT CHANGE UP (ref 150–400)
POTASSIUM SERPL-MCNC: 4.3 MMOL/L — SIGNIFICANT CHANGE UP (ref 3.5–5.3)
POTASSIUM SERPL-MCNC: 4.4 MMOL/L — SIGNIFICANT CHANGE UP (ref 3.5–5.3)
POTASSIUM SERPL-MCNC: 4.5 MMOL/L — SIGNIFICANT CHANGE UP (ref 3.5–5.3)
POTASSIUM SERPL-SCNC: 4.3 MMOL/L — SIGNIFICANT CHANGE UP (ref 3.5–5.3)
POTASSIUM SERPL-SCNC: 4.4 MMOL/L — SIGNIFICANT CHANGE UP (ref 3.5–5.3)
POTASSIUM SERPL-SCNC: 4.5 MMOL/L — SIGNIFICANT CHANGE UP (ref 3.5–5.3)
PROT SERPL-MCNC: 6.9 G/DL — SIGNIFICANT CHANGE UP (ref 6–8.3)
PROTHROM AB SERPL-ACNC: 14.6 SEC — HIGH (ref 10–12.9)
PROTHROM AB SERPL-ACNC: 14.8 SEC — HIGH (ref 10–12.9)
PROTHROM AB SERPL-ACNC: 15.4 SEC — HIGH (ref 10–12.9)
RBC # BLD: 3.09 M/UL — LOW (ref 4.2–5.8)
RBC # BLD: 3.17 M/UL — LOW (ref 4.2–5.8)
RBC # BLD: 3.19 M/UL — LOW (ref 4.2–5.8)
RBC # FLD: 14.6 % — HIGH (ref 10.3–14.5)
RBC # FLD: 14.7 % — HIGH (ref 10.3–14.5)
RBC # FLD: 14.7 % — HIGH (ref 10.3–14.5)
SODIUM SERPL-SCNC: 139 MMOL/L — SIGNIFICANT CHANGE UP (ref 135–145)
SODIUM SERPL-SCNC: 139 MMOL/L — SIGNIFICANT CHANGE UP (ref 135–145)
SODIUM SERPL-SCNC: 140 MMOL/L — SIGNIFICANT CHANGE UP (ref 135–145)
WBC # BLD: 17 K/UL — HIGH (ref 3.8–10.5)
WBC # BLD: 18.48 K/UL — HIGH (ref 3.8–10.5)
WBC # BLD: 19.28 K/UL — HIGH (ref 3.8–10.5)
WBC # FLD AUTO: 17 K/UL — HIGH (ref 3.8–10.5)
WBC # FLD AUTO: 18.48 K/UL — HIGH (ref 3.8–10.5)
WBC # FLD AUTO: 19.28 K/UL — HIGH (ref 3.8–10.5)

## 2019-04-22 PROCEDURE — 99292 CRITICAL CARE ADDL 30 MIN: CPT | Mod: 25

## 2019-04-22 PROCEDURE — 99223 1ST HOSP IP/OBS HIGH 75: CPT | Mod: GC

## 2019-04-22 PROCEDURE — 31645 BRNCHSC W/THER ASPIR 1ST: CPT

## 2019-04-22 PROCEDURE — 71045 X-RAY EXAM CHEST 1 VIEW: CPT | Mod: 26,76

## 2019-04-22 PROCEDURE — 99291 CRITICAL CARE FIRST HOUR: CPT | Mod: 25

## 2019-04-22 RX ORDER — VANCOMYCIN HCL 1 G
1000 VIAL (EA) INTRAVENOUS EVERY 12 HOURS
Qty: 0 | Refills: 0 | Status: DISCONTINUED | OUTPATIENT
Start: 2019-04-22 | End: 2019-04-22

## 2019-04-22 RX ORDER — AZITHROMYCIN 500 MG/1
TABLET, FILM COATED ORAL
Qty: 0 | Refills: 0 | Status: DISCONTINUED | OUTPATIENT
Start: 2019-04-22 | End: 2019-04-24

## 2019-04-22 RX ORDER — AZITHROMYCIN 500 MG/1
500 TABLET, FILM COATED ORAL ONCE
Qty: 0 | Refills: 0 | Status: COMPLETED | OUTPATIENT
Start: 2019-04-22 | End: 2019-04-22

## 2019-04-22 RX ORDER — VANCOMYCIN HCL 1 G
1000 VIAL (EA) INTRAVENOUS EVERY 12 HOURS
Qty: 0 | Refills: 0 | Status: DISCONTINUED | OUTPATIENT
Start: 2019-04-23 | End: 2019-04-24

## 2019-04-22 RX ORDER — MIDAZOLAM HYDROCHLORIDE 1 MG/ML
2 INJECTION, SOLUTION INTRAMUSCULAR; INTRAVENOUS ONCE
Qty: 0 | Refills: 0 | Status: DISCONTINUED | OUTPATIENT
Start: 2019-04-22 | End: 2019-04-22

## 2019-04-22 RX ORDER — LIDOCAINE 4 G/100G
1 CREAM TOPICAL ONCE
Qty: 0 | Refills: 0 | Status: COMPLETED | OUTPATIENT
Start: 2019-04-22 | End: 2019-04-22

## 2019-04-22 RX ORDER — HYDROMORPHONE HYDROCHLORIDE 2 MG/ML
1 INJECTION INTRAMUSCULAR; INTRAVENOUS; SUBCUTANEOUS ONCE
Qty: 0 | Refills: 0 | Status: DISCONTINUED | OUTPATIENT
Start: 2019-04-22 | End: 2019-04-22

## 2019-04-22 RX ORDER — LIDOCAINE HCL 20 MG/ML
20 VIAL (ML) INJECTION ONCE
Qty: 0 | Refills: 0 | Status: COMPLETED | OUTPATIENT
Start: 2019-04-22 | End: 2019-04-22

## 2019-04-22 RX ORDER — PIPERACILLIN AND TAZOBACTAM 4; .5 G/20ML; G/20ML
3.38 INJECTION, POWDER, LYOPHILIZED, FOR SOLUTION INTRAVENOUS EVERY 6 HOURS
Qty: 0 | Refills: 0 | Status: DISCONTINUED | OUTPATIENT
Start: 2019-04-22 | End: 2019-04-22

## 2019-04-22 RX ORDER — AZITHROMYCIN 500 MG/1
500 TABLET, FILM COATED ORAL EVERY 24 HOURS
Qty: 0 | Refills: 0 | Status: DISCONTINUED | OUTPATIENT
Start: 2019-04-23 | End: 2019-04-24

## 2019-04-22 RX ADMIN — Medication 2: at 22:20

## 2019-04-22 RX ADMIN — Medication 0.5 MILLIGRAM(S): at 10:09

## 2019-04-22 RX ADMIN — Medication 0.5 MILLIGRAM(S): at 20:34

## 2019-04-22 RX ADMIN — OXYCODONE AND ACETAMINOPHEN 1 TABLET(S): 5; 325 TABLET ORAL at 21:00

## 2019-04-22 RX ADMIN — MIDAZOLAM HYDROCHLORIDE 2 MILLIGRAM(S): 1 INJECTION, SOLUTION INTRAMUSCULAR; INTRAVENOUS at 10:10

## 2019-04-22 RX ADMIN — LEVALBUTEROL 0.63 MILLIGRAM(S): 1.25 SOLUTION, CONCENTRATE RESPIRATORY (INHALATION) at 05:06

## 2019-04-22 RX ADMIN — Medication 500 MICROGRAM(S): at 05:07

## 2019-04-22 RX ADMIN — POLYETHYLENE GLYCOL 3350 17 GRAM(S): 17 POWDER, FOR SOLUTION ORAL at 12:07

## 2019-04-22 RX ADMIN — HEPARIN SODIUM 5000 UNIT(S): 5000 INJECTION INTRAVENOUS; SUBCUTANEOUS at 14:12

## 2019-04-22 RX ADMIN — Medication 60 MILLIGRAM(S): at 00:15

## 2019-04-22 RX ADMIN — CHLORHEXIDINE GLUCONATE 1 APPLICATION(S): 213 SOLUTION TOPICAL at 08:02

## 2019-04-22 RX ADMIN — LEVALBUTEROL 0.63 MILLIGRAM(S): 1.25 SOLUTION, CONCENTRATE RESPIRATORY (INHALATION) at 00:00

## 2019-04-22 RX ADMIN — LEVALBUTEROL 0.63 MILLIGRAM(S): 1.25 SOLUTION, CONCENTRATE RESPIRATORY (INHALATION) at 18:21

## 2019-04-22 RX ADMIN — Medication 1 PATCH: at 12:07

## 2019-04-22 RX ADMIN — Medication 1 PATCH: at 19:32

## 2019-04-22 RX ADMIN — Medication 250 MILLIGRAM(S): at 18:22

## 2019-04-22 RX ADMIN — PIPERACILLIN AND TAZOBACTAM 200 GRAM(S): 4; .5 INJECTION, POWDER, LYOPHILIZED, FOR SOLUTION INTRAVENOUS at 12:07

## 2019-04-22 RX ADMIN — HEPARIN SODIUM 5000 UNIT(S): 5000 INJECTION INTRAVENOUS; SUBCUTANEOUS at 05:13

## 2019-04-22 RX ADMIN — HYDROMORPHONE HYDROCHLORIDE 1 MILLIGRAM(S): 2 INJECTION INTRAMUSCULAR; INTRAVENOUS; SUBCUTANEOUS at 09:45

## 2019-04-22 RX ADMIN — LIDOCAINE 1 APPLICATION(S): 4 CREAM TOPICAL at 10:09

## 2019-04-22 RX ADMIN — Medication 1 PATCH: at 20:01

## 2019-04-22 RX ADMIN — Medication 400 MILLIGRAM(S): at 05:12

## 2019-04-22 RX ADMIN — Medication 500 MICROGRAM(S): at 00:08

## 2019-04-22 RX ADMIN — Medication 1 PATCH: at 19:33

## 2019-04-22 RX ADMIN — Medication 60 MILLIGRAM(S): at 05:13

## 2019-04-22 RX ADMIN — OXYCODONE AND ACETAMINOPHEN 1 TABLET(S): 5; 325 TABLET ORAL at 19:33

## 2019-04-22 RX ADMIN — LEVALBUTEROL 0.63 MILLIGRAM(S): 1.25 SOLUTION, CONCENTRATE RESPIRATORY (INHALATION) at 14:36

## 2019-04-22 RX ADMIN — ATORVASTATIN CALCIUM 80 MILLIGRAM(S): 80 TABLET, FILM COATED ORAL at 21:58

## 2019-04-22 RX ADMIN — PANTOPRAZOLE SODIUM 40 MILLIGRAM(S): 20 TABLET, DELAYED RELEASE ORAL at 05:15

## 2019-04-22 RX ADMIN — OXYCODONE AND ACETAMINOPHEN 1 TABLET(S): 5; 325 TABLET ORAL at 16:51

## 2019-04-22 RX ADMIN — PIPERACILLIN AND TAZOBACTAM 200 GRAM(S): 4; .5 INJECTION, POWDER, LYOPHILIZED, FOR SOLUTION INTRAVENOUS at 18:22

## 2019-04-22 RX ADMIN — Medication 500 MICROGRAM(S): at 19:02

## 2019-04-22 RX ADMIN — Medication 60 MILLIGRAM(S): at 21:58

## 2019-04-22 RX ADMIN — LEVALBUTEROL 0.63 MILLIGRAM(S): 1.25 SOLUTION, CONCENTRATE RESPIRATORY (INHALATION) at 22:21

## 2019-04-22 RX ADMIN — Medication 1000 MILLIGRAM(S): at 07:14

## 2019-04-22 RX ADMIN — HEPARIN SODIUM 5000 UNIT(S): 5000 INJECTION INTRAVENOUS; SUBCUTANEOUS at 21:57

## 2019-04-22 RX ADMIN — Medication 60 MILLIGRAM(S): at 14:12

## 2019-04-22 RX ADMIN — Medication 20 MILLILITER(S): at 10:09

## 2019-04-22 RX ADMIN — AZITHROMYCIN 255 MILLIGRAM(S): 500 TABLET, FILM COATED ORAL at 21:58

## 2019-04-22 RX ADMIN — Medication 500 MICROGRAM(S): at 23:58

## 2019-04-22 RX ADMIN — Medication 81 MILLIGRAM(S): at 12:07

## 2019-04-22 RX ADMIN — OXYCODONE AND ACETAMINOPHEN 1 TABLET(S): 5; 325 TABLET ORAL at 21:30

## 2019-04-22 RX ADMIN — Medication 500 MICROGRAM(S): at 14:35

## 2019-04-22 RX ADMIN — LEVALBUTEROL 0.63 MILLIGRAM(S): 1.25 SOLUTION, CONCENTRATE RESPIRATORY (INHALATION) at 10:08

## 2019-04-22 RX ADMIN — HYDROMORPHONE HYDROCHLORIDE 1 MILLIGRAM(S): 2 INJECTION INTRAMUSCULAR; INTRAVENOUS; SUBCUTANEOUS at 14:12

## 2019-04-22 RX ADMIN — Medication 6: at 08:11

## 2019-04-22 NOTE — PROGRESS NOTE ADULT - SUBJECTIVE AND OBJECTIVE BOX
Name of procedure – Flexible fiberoptic bronchoscopy    Indication –   Respiratory failure    Flexible fiberoptic bronchoscopy was performed under conscious sedation using 2 mg versed iv    The scope was advanced nasotracheally after anaesthetizing the nares using viscous lidocaine   VC were sharp and moving well…    The panchito was sharp  Right LL and RML air way were patent , scant secretions  LLL and ZACHARY air way were patent , scant secretions    CXR confirmed no pneumothorax post bronch  There were no complications  The patient tolerated the procedure well

## 2019-04-22 NOTE — PROGRESS NOTE ADULT - SUBJECTIVE AND OBJECTIVE BOX
CTICU  CRITICAL  CARE  attending     Hand off received 					   Pertinent clinical, laboratory, radiographic, hemodynamic, echocardiographic, respiratory data, microbiologic data and chart were reviewed and analyzed frequently throughout the course of the day and night  Patient seen and examined with CTS/ SH attending at bedside  Pt is a 49y , Male, HEALTH ISSUES - PROBLEM Dx:  Tracheobronchitis: Tracheobronchitis  Centrilobular emphysema: Centrilobular emphysema  Leukocytosis: Leukocytosis  Unstable angina: Unstable angina      , FAMILY HISTORY:  Family history of myocardial infarction: Father (age unknown)  PAST MEDICAL & SURGICAL HISTORY:  No pertinent past medical history  H/O cardiac catheterization    Patient is a 49y old  Male who presents with a chief complaint of Unstable Angina (22 Apr 2019 22:51)      14 system review was unremarkable  acute changes include acute respiratory failure  Vital signs, hemodynamic and respiratory parameters were reviewed from the bedside nursing flowsheet.  ICU Vital Signs Last 24 Hrs  T(C): 36.9 (22 Apr 2019 21:58), Max: 37.1 (22 Apr 2019 14:16)  T(F): 98.4 (22 Apr 2019 21:58), Max: 98.8 (22 Apr 2019 14:16)  HR: 106 (22 Apr 2019 22:00) (80 - 106)  BP: 105/66 (22 Apr 2019 22:00) (100/57 - 120/65)  BP(mean): 76 (22 Apr 2019 22:00) (67 - 88)  ABP: 148/72 (22 Apr 2019 12:00) (104/54 - 148/72)  ABP(mean): 94 (22 Apr 2019 12:00) (64 - 94)  RR: 36 (22 Apr 2019 22:00) (16 - 42)  SpO2: 93% (22 Apr 2019 22:00) (86% - 96%)    Adult Advanced Hemodynamics Last 24 Hrs  CVP(mm Hg): --  CVP(cm H2O): --  CO: --  CI: --  PA: --  PA(mean): --  PCWP: --  SVR: --  SVRI: --  PVR: --  PVRI: --, ABG - ( 22 Apr 2019 10:48 )  pH, Arterial: 7.46  pH, Blood: x     /  pCO2: 44    /  pO2: 63    / HCO3: 31    / Base Excess: 6.1   /  SaO2: 92                  Intake and output was reviewed and the fluid balance was calculated  Daily     Daily   I&O's Summary    21 Apr 2019 07:01  -  22 Apr 2019 07:00  --------------------------------------------------------  IN: 1190 mL / OUT: 3870 mL / NET: -2680 mL    22 Apr 2019 07:01  -  22 Apr 2019 22:52  --------------------------------------------------------  IN: 475 mL / OUT: 725 mL / NET: -250 mL        All lines and drain sites were assessed  Glycemic trend was reviewedCAPILLARY BLOOD GLUCOSE      POCT Blood Glucose.: 189 mg/dL (22 Apr 2019 22:02)    No acute change in mental status  Auscultation of the chest reveals equal bs  Abdomen is soft  Extremities are warm and well perfused  Wounds appear clean and unremarkable  Antibiotics are periop    labs  CBC Full  -  ( 22 Apr 2019 10:49 )  WBC Count : 18.48 K/uL  RBC Count : 3.17 M/uL  Hemoglobin : 8.8 g/dL  Hematocrit : 27.7 %  Platelet Count - Automated : 221 K/uL  Mean Cell Volume : 87.4 fl  Mean Cell Hemoglobin : 27.8 pg  Mean Cell Hemoglobin Concentration : 31.8 gm/dL  Auto Neutrophil # : x  Auto Lymphocyte # : x  Auto Monocyte # : x  Auto Eosinophil # : x  Auto Basophil # : x  Auto Neutrophil % : x  Auto Lymphocyte % : x  Auto Monocyte % : x  Auto Eosinophil % : x  Auto Basophil % : x    04-22    139  |  100  |  16  ----------------------------<  151<H>  4.3   |  28  |  0.73    Ca    9.1      22 Apr 2019 10:49  Phos  2.3     04-22  Mg     2.5     04-22    TPro  6.9  /  Alb  3.7  /  TBili  0.5  /  DBili  x   /  AST  24  /  ALT  19  /  AlkPhos  90  04-22    PT/INR - ( 22 Apr 2019 10:49 )   PT: 14.6 sec;   INR: 1.28          PTT - ( 22 Apr 2019 10:49 )  PTT:31.5 sec  The current medications were reviewed   MEDICATIONS  (STANDING):  aspirin enteric coated 81 milliGRAM(s) Oral daily  atorvastatin 80 milliGRAM(s) Oral at bedtime  azithromycin  IVPB      buDESOnide   0.5 milliGRAM(s) Respule 0.5 milliGRAM(s) Nebulizer two times a day  chlorhexidine 2% Cloths 1 Application(s) Topical daily  dextrose 5%. 1000 milliLiter(s) (50 mL/Hr) IV Continuous <Continuous>  dextrose 50% Injectable 50 milliLiter(s) IV Push every 15 minutes  dextrose 50% Injectable 25 milliLiter(s) IV Push every 15 minutes  diltiazem    Tablet 60 milliGRAM(s) Oral every 8 hours  heparin  Injectable 5000 Unit(s) SubCutaneous every 8 hours  insulin lispro (HumaLOG) corrective regimen sliding scale   SubCutaneous Before meals and at bedtime  ipratropium    for Nebulization 500 MICROGram(s) Nebulizer every 6 hours  levalbuterol Inhalation 0.63 milliGRAM(s) Inhalation every 4 hours  nicotine - 21 mG/24Hr(s) Patch 1 patch Transdermal daily  pantoprazole    Tablet 40 milliGRAM(s) Oral before breakfast  polyethylene glycol 3350 17 Gram(s) Oral daily  sodium chloride 0.9%. 1000 milliLiter(s) (10 mL/Hr) IV Continuous <Continuous>    MEDICATIONS  (PRN):  acetaminophen   Tablet .. 650 milliGRAM(s) Oral every 6 hours PRN Mild Pain (1 - 3)  dextrose 40% Gel 15 Gram(s) Oral once PRN Blood Glucose LESS THAN 70 milliGRAM(s)/deciliter  glucagon  Injectable 1 milliGRAM(s) IntraMuscular once PRN Glucose LESS THAN 70 milligrams/deciliter  oxyCODONE    5 mG/acetaminophen 325 mG 1 Tablet(s) Oral every 6 hours PRN Moderate Pain (4 - 6)       PROBLEM LIST/ ASSESSMENT:  HEALTH ISSUES - PROBLEM Dx:  Tracheobronchitis: Tracheobronchitis  Centrilobular emphysema: Centrilobular emphysema  Leukocytosis: Leukocytosis  Unstable angina: Unstable angina      ,   Patient is a 49y old  Male who presents with a chief complaint of Unstable Angina (22 Apr 2019 22:51)     s/p cardiac surgery  acute respiratory failure      My plan includes :  close hemodynamic, ventilatory and drain monitoring and management per post op routine    Monitor for arrhythmias and monitor parameters for organ perfusion  beta blockade not administered due to hemodynamic instability and bradycardia  monitor neurologic status  Head of the bed should remain elevated to 45 deg .   chest PT and IS will be encouraged  monitor adequacy of oxygenation and ventilation and attempt to wean oxygen  antibiotic regimen will be tailored to the clinical, laboratory and microbiologic data  Nutritional goals will be met using po eventually , ensure adequate caloric intake and montior the same  Stress ulcer and VTE prophylaxis will be achieved    Glycemic control is satisfactory  Electrolytes have been repleted as necessary and wound care has been carried out. Pain control has been achieved.   agressive physical therapy and early mobility and ambulation goals will be met   The family was updated about the course and plan  CRITICAL CARE TIME SPENT in evaluation and management, reassessments, review and interpretation of labs and x-rays, ventilator and hemodynamic management, formulating a plan and coordinating care: ___90____ MIN.  Time does not include procedural time.  CTICU ATTENDING     					    Demarco Moya MD

## 2019-04-22 NOTE — PROGRESS NOTE ADULT - SUBJECTIVE AND OBJECTIVE BOX
[facility] PRE-BRONCHOSCOPY RISK ASSESSMENT  ------------------------------------------------------------    Procedure Date:     Provider Name: MICHELLE Moya MD     Reason for Bronchoscopy:    Location of Procedure (check one):   [  ] Endoscopy  [  ] Emergency Department  [x  ] Intensive Care Unit  [  ] Operating Room  [  ] Other: _________    RISK ASSESSMENT  I. Patient symptoms (check all that apply): >/ 3 of these = SIGNIFICANT RISK for TB  [  ] Coughing > 2 to 3 weeks                 [  ] Unexplained fever >/ 2 weeks   [  ] Unusual weakness or fatigue  [  ] Unexplained weight loss > 10lbs.  [  ] Hemoptysis                                   [  ] Unusual or night sweating  [x  ] NON-APPLICABLE    II. TB history (Check all that apply): >/ 1 of these = SIGNIFICANT RISK for TB  [  ] Sputum smear/culture (+) for acid fast bacilli (AFB)                           [  ] Abnormal CXR or CT suggestive of TB; date(s) & description __________  [  ] Positive TB skin or blood test; date: __________                               [  ] On medication for latent TB or disease; list medication(s) ____________  [  ] TB diagnosed in the past; year treated: _______                                [  ] Inadequately treated TB  [  ] Current close contact of a person known or suspected to have TB  [x  ] NON-APPLICABLE    III. Additional Risk factors for TB (Check all that apply): Consider these in relation to symptoms and history  [  ] Person has conditions placing them at higher risk for TB disease (i.e. immunosuppressive therapy)  [  ] Person has lived in a country for 3 months or more where TB is common  [  ] Person lives in a high risk environment for TB (i.e. long term care, health care worker, incarcerated, homeless)  [  ] Person injects illicit drugs  [  ] Person is HIV positive or at high risk for HIV    ****************************************************************  BASED ON THE TB RISK ASSESSMENT ABOVE, THE PATIENT'S RISK FOR TB IS:                       [ x ] LOW RISK FOR TB            [  ] SIGNIFICANT RISK FOR TB  ****************************************************************    IV. Based on the Determined Risk for TB, the following Action(s) are Recommended:  [  x] Low risk for TB infection --> Proceed with the diagnostic procedure    [  ] Significant risk for TB infection:  1. Perform the procedure in a negative pressure room, with appropriate personal protective equipment (PPE) for healthcare personnel (i.e. N95 respirator)    2. If it is not feasible to move the patient or defer the procedure:    a. Use a single-bedded room in a low traffic area to perform the bronchoscopy procedure    b. Place a portable high-efficiency particulate air (HEPA) filter in the space prior to starting the procedure and keep the door closed. Refer to Infection Control policy titled "Tuberculosis Control Strategy Plan" for additional information.    c. All healthcare personnel in the procedure room shall wear and N95 disposible respirator.    3. Documentation of the tuberculosis risk assessment is to be included in the patient's medical record.

## 2019-04-22 NOTE — CONSULT NOTE ADULT - SUBJECTIVE AND OBJECTIVE BOX
Patient is a 49y old  Male who presents with a chief complaint of Unstable Angina (21 Apr 2019 17:26)      HPI:  History obtained from pt via translation of [employer]   ID [id number] and ID [id number].   50 y/o Maltese Speaking M (who came from [place] 1 month ago, now living in [place]), On No Current Meds at Home, w/ FMHX CAD/MI, Ex-Marijuana User (Quit 15 yrs ago) w/ PMHX Prior Diagnostic Cardiac Cath. / presumed Non-Obstructive CAD in [place], who reports C/P x 3 years, known to Dr. Zhong w/ recent abnormal Stress Echo 4/15/19 revealing, terminated due to C/P, 72 % of maximum predicted target HR which renders the stress load achieved inconclusive/non-diagnostic for illustration of possible presence of myocardial ischemia LVEF 60-65%, patient was initially scheduled for ambulatory cath today however due to unstable angina presented to [facility] ED today. Pt c/o progressive 10/10 L sided "sharp" C/P independent of rest/activity, w/ prior radiation to the back (not radiating today). Associated symptoms include SOB, dizziness, diaphoresis, nausea, fatigue / general weakness. Pt denies vomiting, orthopnea/PND, LE edema, syncope. In ED /76, HR 60s, RR 16, T 97, O2 98% RA. Labs significant for Troponin negative, WBC 10.9. EKG showed NSR @ 73bpm, w/ incomplete RBBB, j point elevation in V2-V4, I, AVL, TWI and ST depression up to 0.5mmg in II, III, AVF. ASA 325mg and Plavix 600mg given. Pt received SL NTG x 3 without relief. C/P noted to be worse with palpation of chest wall. Repeat EKG done, both EKGs reviewed with Dr. Zhong, additional j point elevation noted in V1, otherwise no change. Pt then received Morphine 4mg IV, currently comfortable at rest and on ambulation with no reported symptoms. CXR clear per verbal radiology read. Denies fever/chills, cough, abdominal pain, and diarrhea.    Due to pts risk factors, unable angina and non-conclusive stress echo pt is referred for cardiac catheterization w/ possible intervention (17 Apr 2019 11:02)      PAST MEDICAL & SURGICAL HISTORY:  No pertinent past medical history  H/O cardiac catheterization      FAMILY HISTORY:  Family history of myocardial infarction: Father (age unknown)      SOCIAL HISTORY:  Smoking Status: [x ] Current, [ ] Former, [ ] Never  Pack Years:    MEDICATIONS:  Pulmonary:  buDESOnide   0.5 milliGRAM(s) Respule 0.5 milliGRAM(s) Nebulizer two times a day  ipratropium    for Nebulization 500 MICROGram(s) Nebulizer every 6 hours  levalbuterol Inhalation 0.63 milliGRAM(s) Inhalation every 4 hours    Antimicrobials:  azithromycin  IVPB        Anticoagulants:  aspirin enteric coated 81 milliGRAM(s) Oral daily  heparin  Injectable 5000 Unit(s) SubCutaneous every 8 hours    Onc:    GI/:  pantoprazole    Tablet 40 milliGRAM(s) Oral before breakfast  polyethylene glycol 3350 17 Gram(s) Oral daily    Endocrine:  atorvastatin 80 milliGRAM(s) Oral at bedtime  dextrose 40% Gel 15 Gram(s) Oral once PRN  dextrose 50% Injectable 50 milliLiter(s) IV Push every 15 minutes  dextrose 50% Injectable 25 milliLiter(s) IV Push every 15 minutes  glucagon  Injectable 1 milliGRAM(s) IntraMuscular once PRN  insulin lispro (HumaLOG) corrective regimen sliding scale   SubCutaneous Before meals and at bedtime    Cardiac:  diltiazem    Tablet 60 milliGRAM(s) Oral every 8 hours    Other Medications:  acetaminophen   Tablet .. 650 milliGRAM(s) Oral every 6 hours PRN  chlorhexidine 2% Cloths 1 Application(s) Topical daily  dextrose 5%. 1000 milliLiter(s) IV Continuous <Continuous>  nicotine - 21 mG/24Hr(s) Patch 1 patch Transdermal daily  oxyCODONE    5 mG/acetaminophen 325 mG 1 Tablet(s) Oral every 6 hours PRN  sodium chloride 0.9%. 1000 milliLiter(s) IV Continuous <Continuous>      Allergies    No Known Allergies    Intolerances        Vital Signs Last 24 Hrs  T(C): 36.9 (22 Apr 2019 21:58), Max: 37.1 (21 Apr 2019 22:23)  T(F): 98.4 (22 Apr 2019 21:58), Max: 98.8 (21 Apr 2019 22:23)  HR: 96 (22 Apr 2019 21:00) (80 - 106)  BP: 111/65 (22 Apr 2019 20:11) (100/57 - 119/76)  BP(mean): 82 (22 Apr 2019 20:11) (67 - 88)  RR: 17 (22 Apr 2019 15:00) (16 - 24)  SpO2: 91% (22 Apr 2019 21:00) (86% - 96%)    04-21 @ 07:01  -  04-22 @ 07:00  --------------------------------------------------------  IN: 1190 mL / OUT: 3870 mL / NET: -2680 mL    04-22 @ 07:01  - 04-22 @ 22:16  --------------------------------------------------------  IN: 0 mL / OUT: 725 mL / NET: -725 mL          LABS:  ABG - ( 22 Apr 2019 10:48 )  pH, Arterial: 7.46  pH, Blood: x     /  pCO2: 44    /  pO2: 63    / HCO3: 31    / Base Excess: 6.1   /  SaO2: 92                  CBC Full  -  ( 22 Apr 2019 10:49 )  WBC Count : 18.48 K/uL  RBC Count : 3.17 M/uL  Hemoglobin : 8.8 g/dL  Hematocrit : 27.7 %  Platelet Count - Automated : 221 K/uL  Mean Cell Volume : 87.4 fl  Mean Cell Hemoglobin : 27.8 pg  Mean Cell Hemoglobin Concentration : 31.8 gm/dL  Auto Neutrophil # : x  Auto Lymphocyte # : x  Auto Monocyte # : x  Auto Eosinophil # : x  Auto Basophil # : x  Auto Neutrophil % : x  Auto Lymphocyte % : x  Auto Monocyte % : x  Auto Eosinophil % : x  Auto Basophil % : x    04-22    139  |  100  |  16  ----------------------------<  151<H>  4.3   |  28  |  0.73    Ca    9.1      22 Apr 2019 10:49  Phos  2.3     04-22  Mg     2.5     04-22    TPro  6.9  /  Alb  3.7  /  TBili  0.5  /  DBili  x   /  AST  24  /  ALT  19  /  AlkPhos  90  04-22    PT/INR - ( 22 Apr 2019 10:49 )   PT: 14.6 sec;   INR: 1.28          PTT - ( 22 Apr 2019 10:49 )  PTT:31.5 sec          < from: Xray Chest 1 View- PORTABLE-Routine (04.22.19 @ 05:47) >    EXAM:  XR CHEST PORTABLE ROUTINE 1V                          PROCEDURE DATE:  04/22/2019          INTERPRETATION:  Chest x-ray    Indication: Postoperative patient. Angina.    2 AP views of the chest are compared to the prior study dated 4/21/2019.  Right IJ line is unchanged. Persistent small to moderate right pleural   effusion. Cannot exclude small left pleural effusion. Linear atelectasis   right midlung field. No pneumothorax.      IMPRESSION: Small-to-moderate right pleural effusion.    < end of copied text >          RADIOLOGY & ADDITIONAL STUDIES (The following images were personally reviewed):

## 2019-04-22 NOTE — PROGRESS NOTE ADULT - SUBJECTIVE AND OBJECTIVE BOX
FREDY LUJAN   MRN#: [id number]     The patient is a 49y Male who was seen, evaluated, & examined with the CTICU staff post-operatively with a multidisciplinary care plan formulated & implemented.  All available clinical, laboratory, radiographic, pharmacologic, and electrocardiographic data were reviewed & analyzed.      The patient was in the CTICU in critical condition secondary to:     persistent cardiopulmonary dysfunction    For support and evaluation & prevention of further decompensation secondary to persistent cardiopulmonary dysfunction, respiratory status required:     supplemental oxygen with BiPaP / high flow nasal cannula  continuous pulse oximetry monitoring  following ABGs with A-line monitoring    Invasive hemodynamic monitoring with an A-line was required for continuous MAP/BP monitoring to ensure adequate cardiovascular support and to evaluate for & help prevent decompensation    In addition:  Copious thick secretions on Zosyn/Azithro s/p bronch with cultures sent; followed by Pulmonary - will likely need repeat bronch tomorrow  Poor inspiratory effort - cycles between HFNC and BIPAP, likely needs PEEP to recruit collapsed lung  Ambulated x3 today with BIPAP    The patient required critical care management and I provided 30 minutes of non-continuous care to the patient in addition to  discussing the patient and plan at length with the CTICU staff and helping coordinate care.

## 2019-04-22 NOTE — CONSULT NOTE ADULT - PROBLEM SELECTOR RECOMMENDATION 9
he has chronic bronchitis by history, and he is to continue on the current inhalers and aggressive pulmonary toilet.  He is OOB in chair.  Monitor the sternal wound.  Hold on steroids for now

## 2019-04-23 LAB
ALBUMIN SERPL ELPH-MCNC: 3.8 G/DL — SIGNIFICANT CHANGE UP (ref 3.3–5)
ALBUMIN SERPL ELPH-MCNC: 4 G/DL — SIGNIFICANT CHANGE UP (ref 3.3–5)
ALP SERPL-CCNC: 100 U/L — SIGNIFICANT CHANGE UP (ref 40–120)
ALP SERPL-CCNC: 102 U/L — SIGNIFICANT CHANGE UP (ref 40–120)
ALT FLD-CCNC: 26 U/L — SIGNIFICANT CHANGE UP (ref 10–45)
ALT FLD-CCNC: 27 U/L — SIGNIFICANT CHANGE UP (ref 10–45)
ANION GAP SERPL CALC-SCNC: 12 MMOL/L — SIGNIFICANT CHANGE UP (ref 5–17)
ANION GAP SERPL CALC-SCNC: 13 MMOL/L — SIGNIFICANT CHANGE UP (ref 5–17)
APTT BLD: 30.6 SEC — SIGNIFICANT CHANGE UP (ref 27.5–36.3)
APTT BLD: 32.9 SEC — SIGNIFICANT CHANGE UP (ref 27.5–36.3)
AST SERPL-CCNC: 28 U/L — SIGNIFICANT CHANGE UP (ref 10–40)
AST SERPL-CCNC: 29 U/L — SIGNIFICANT CHANGE UP (ref 10–40)
BILIRUB SERPL-MCNC: 0.7 MG/DL — SIGNIFICANT CHANGE UP (ref 0.2–1.2)
BILIRUB SERPL-MCNC: 0.7 MG/DL — SIGNIFICANT CHANGE UP (ref 0.2–1.2)
BUN SERPL-MCNC: 15 MG/DL — SIGNIFICANT CHANGE UP (ref 7–23)
BUN SERPL-MCNC: 16 MG/DL — SIGNIFICANT CHANGE UP (ref 7–23)
CALCIUM SERPL-MCNC: 8.9 MG/DL — SIGNIFICANT CHANGE UP (ref 8.4–10.5)
CALCIUM SERPL-MCNC: 9.1 MG/DL — SIGNIFICANT CHANGE UP (ref 8.4–10.5)
CHLORIDE SERPL-SCNC: 96 MMOL/L — SIGNIFICANT CHANGE UP (ref 96–108)
CHLORIDE SERPL-SCNC: 98 MMOL/L — SIGNIFICANT CHANGE UP (ref 96–108)
CO2 SERPL-SCNC: 27 MMOL/L — SIGNIFICANT CHANGE UP (ref 22–31)
CO2 SERPL-SCNC: 27 MMOL/L — SIGNIFICANT CHANGE UP (ref 22–31)
CREAT SERPL-MCNC: 0.76 MG/DL — SIGNIFICANT CHANGE UP (ref 0.5–1.3)
CREAT SERPL-MCNC: 0.88 MG/DL — SIGNIFICANT CHANGE UP (ref 0.5–1.3)
GLUCOSE BLDC GLUCOMTR-MCNC: 102 MG/DL — HIGH (ref 70–99)
GLUCOSE BLDC GLUCOMTR-MCNC: 161 MG/DL — HIGH (ref 70–99)
GLUCOSE BLDC GLUCOMTR-MCNC: 191 MG/DL — HIGH (ref 70–99)
GLUCOSE BLDC GLUCOMTR-MCNC: 91 MG/DL — SIGNIFICANT CHANGE UP (ref 70–99)
GLUCOSE SERPL-MCNC: 79 MG/DL — SIGNIFICANT CHANGE UP (ref 70–99)
GLUCOSE SERPL-MCNC: 96 MG/DL — SIGNIFICANT CHANGE UP (ref 70–99)
GRAM STN FLD: SIGNIFICANT CHANGE UP
GRAM STN FLD: SIGNIFICANT CHANGE UP
HCT VFR BLD CALC: 29.5 % — LOW (ref 39–50)
HCT VFR BLD CALC: 31.5 % — LOW (ref 39–50)
HGB BLD-MCNC: 10.4 G/DL — LOW (ref 13–17)
HGB BLD-MCNC: 9.6 G/DL — LOW (ref 13–17)
INR BLD: 1.18 — HIGH (ref 0.88–1.16)
INR BLD: 1.21 — HIGH (ref 0.88–1.16)
LACTATE SERPL-SCNC: 1.1 MMOL/L — SIGNIFICANT CHANGE UP (ref 0.5–2)
MAGNESIUM SERPL-MCNC: 2.2 MG/DL — SIGNIFICANT CHANGE UP (ref 1.6–2.6)
MAGNESIUM SERPL-MCNC: 2.4 MG/DL — SIGNIFICANT CHANGE UP (ref 1.6–2.6)
MCHC RBC-ENTMCNC: 28.1 PG — SIGNIFICANT CHANGE UP (ref 27–34)
MCHC RBC-ENTMCNC: 28.7 PG — SIGNIFICANT CHANGE UP (ref 27–34)
MCHC RBC-ENTMCNC: 32.5 GM/DL — SIGNIFICANT CHANGE UP (ref 32–36)
MCHC RBC-ENTMCNC: 33 GM/DL — SIGNIFICANT CHANGE UP (ref 32–36)
MCV RBC AUTO: 86.3 FL — SIGNIFICANT CHANGE UP (ref 80–100)
MCV RBC AUTO: 86.8 FL — SIGNIFICANT CHANGE UP (ref 80–100)
NRBC # BLD: 0 /100 WBCS — SIGNIFICANT CHANGE UP (ref 0–0)
NRBC # BLD: 0 /100 WBCS — SIGNIFICANT CHANGE UP (ref 0–0)
PHOSPHATE SERPL-MCNC: 3.1 MG/DL — SIGNIFICANT CHANGE UP (ref 2.5–4.5)
PHOSPHATE SERPL-MCNC: 3.3 MG/DL — SIGNIFICANT CHANGE UP (ref 2.5–4.5)
PLATELET # BLD AUTO: 314 K/UL — SIGNIFICANT CHANGE UP (ref 150–400)
PLATELET # BLD AUTO: 343 K/UL — SIGNIFICANT CHANGE UP (ref 150–400)
POTASSIUM SERPL-MCNC: 3.8 MMOL/L — SIGNIFICANT CHANGE UP (ref 3.5–5.3)
POTASSIUM SERPL-MCNC: 3.9 MMOL/L — SIGNIFICANT CHANGE UP (ref 3.5–5.3)
POTASSIUM SERPL-SCNC: 3.8 MMOL/L — SIGNIFICANT CHANGE UP (ref 3.5–5.3)
POTASSIUM SERPL-SCNC: 3.9 MMOL/L — SIGNIFICANT CHANGE UP (ref 3.5–5.3)
PROT SERPL-MCNC: 7.2 G/DL — SIGNIFICANT CHANGE UP (ref 6–8.3)
PROT SERPL-MCNC: 7.7 G/DL — SIGNIFICANT CHANGE UP (ref 6–8.3)
PROTHROM AB SERPL-ACNC: 13.4 SEC — HIGH (ref 10–12.9)
PROTHROM AB SERPL-ACNC: 13.7 SEC — HIGH (ref 10–12.9)
RBC # BLD: 3.42 M/UL — LOW (ref 4.2–5.8)
RBC # BLD: 3.63 M/UL — LOW (ref 4.2–5.8)
RBC # FLD: 14.9 % — HIGH (ref 10.3–14.5)
RBC # FLD: 15.1 % — HIGH (ref 10.3–14.5)
SODIUM SERPL-SCNC: 136 MMOL/L — SIGNIFICANT CHANGE UP (ref 135–145)
SODIUM SERPL-SCNC: 137 MMOL/L — SIGNIFICANT CHANGE UP (ref 135–145)
SPECIMEN SOURCE: SIGNIFICANT CHANGE UP
WBC # BLD: 15.28 K/UL — HIGH (ref 3.8–10.5)
WBC # BLD: 17.78 K/UL — HIGH (ref 3.8–10.5)
WBC # FLD AUTO: 15.28 K/UL — HIGH (ref 3.8–10.5)
WBC # FLD AUTO: 17.78 K/UL — HIGH (ref 3.8–10.5)

## 2019-04-23 PROCEDURE — 99232 SBSQ HOSP IP/OBS MODERATE 35: CPT | Mod: GC

## 2019-04-23 PROCEDURE — 31645 BRNCHSC W/THER ASPIR 1ST: CPT

## 2019-04-23 PROCEDURE — 32557 INSERT CATH PLEURA W/ IMAGE: CPT

## 2019-04-23 PROCEDURE — 99292 CRITICAL CARE ADDL 30 MIN: CPT | Mod: 25

## 2019-04-23 PROCEDURE — 99292 CRITICAL CARE ADDL 30 MIN: CPT

## 2019-04-23 PROCEDURE — 99291 CRITICAL CARE FIRST HOUR: CPT | Mod: 25

## 2019-04-23 PROCEDURE — 71045 X-RAY EXAM CHEST 1 VIEW: CPT | Mod: 26

## 2019-04-23 RX ORDER — POTASSIUM CHLORIDE 20 MEQ
20 PACKET (EA) ORAL ONCE
Qty: 0 | Refills: 0 | Status: COMPLETED | OUTPATIENT
Start: 2019-04-23 | End: 2019-04-23

## 2019-04-23 RX ORDER — HYDROMORPHONE HYDROCHLORIDE 2 MG/ML
1 INJECTION INTRAMUSCULAR; INTRAVENOUS; SUBCUTANEOUS ONCE
Qty: 0 | Refills: 0 | Status: DISCONTINUED | OUTPATIENT
Start: 2019-04-23 | End: 2019-04-23

## 2019-04-23 RX ORDER — TETRACAINE/BENZOCAINE/BUTAMBEN 2%-14%-2%
1 OINTMENT (GRAM) TOPICAL ONCE
Qty: 0 | Refills: 0 | Status: COMPLETED | OUTPATIENT
Start: 2019-04-23 | End: 2019-04-23

## 2019-04-23 RX ORDER — LIDOCAINE 4 G/100G
1 CREAM TOPICAL ONCE
Qty: 0 | Refills: 0 | Status: COMPLETED | OUTPATIENT
Start: 2019-04-23 | End: 2019-04-23

## 2019-04-23 RX ORDER — LIDOCAINE HCL 20 MG/ML
20 VIAL (ML) INJECTION ONCE
Qty: 0 | Refills: 0 | Status: COMPLETED | OUTPATIENT
Start: 2019-04-23 | End: 2019-04-23

## 2019-04-23 RX ORDER — MIDAZOLAM HYDROCHLORIDE 1 MG/ML
3 INJECTION, SOLUTION INTRAMUSCULAR; INTRAVENOUS ONCE
Qty: 0 | Refills: 0 | Status: DISCONTINUED | OUTPATIENT
Start: 2019-04-23 | End: 2019-04-23

## 2019-04-23 RX ORDER — PIPERACILLIN AND TAZOBACTAM 4; .5 G/20ML; G/20ML
3.38 INJECTION, POWDER, LYOPHILIZED, FOR SOLUTION INTRAVENOUS EVERY 6 HOURS
Qty: 0 | Refills: 0 | Status: DISCONTINUED | OUTPATIENT
Start: 2019-04-23 | End: 2019-04-24

## 2019-04-23 RX ORDER — ALBUMIN HUMAN 25 %
50 VIAL (ML) INTRAVENOUS ONCE
Qty: 0 | Refills: 0 | Status: COMPLETED | OUTPATIENT
Start: 2019-04-23 | End: 2019-04-23

## 2019-04-23 RX ORDER — DEXMEDETOMIDINE HYDROCHLORIDE IN 0.9% SODIUM CHLORIDE 4 UG/ML
0.2 INJECTION INTRAVENOUS
Qty: 200 | Refills: 0 | Status: DISCONTINUED | OUTPATIENT
Start: 2019-04-23 | End: 2019-04-23

## 2019-04-23 RX ORDER — POTASSIUM CHLORIDE 20 MEQ
20 PACKET (EA) ORAL ONCE
Qty: 0 | Refills: 0 | Status: DISCONTINUED | OUTPATIENT
Start: 2019-04-23 | End: 2019-04-23

## 2019-04-23 RX ORDER — HYDROMORPHONE HYDROCHLORIDE 2 MG/ML
0.5 INJECTION INTRAMUSCULAR; INTRAVENOUS; SUBCUTANEOUS ONCE
Qty: 0 | Refills: 0 | Status: DISCONTINUED | OUTPATIENT
Start: 2019-04-23 | End: 2019-04-26

## 2019-04-23 RX ADMIN — PANTOPRAZOLE SODIUM 40 MILLIGRAM(S): 20 TABLET, DELAYED RELEASE ORAL at 07:04

## 2019-04-23 RX ADMIN — Medication 500 MICROGRAM(S): at 18:07

## 2019-04-23 RX ADMIN — LEVALBUTEROL 0.63 MILLIGRAM(S): 1.25 SOLUTION, CONCENTRATE RESPIRATORY (INHALATION) at 10:52

## 2019-04-23 RX ADMIN — CHLORHEXIDINE GLUCONATE 1 APPLICATION(S): 213 SOLUTION TOPICAL at 14:47

## 2019-04-23 RX ADMIN — CHLORHEXIDINE GLUCONATE 1 APPLICATION(S): 213 SOLUTION TOPICAL at 05:23

## 2019-04-23 RX ADMIN — Medication 0.5 MILLIGRAM(S): at 22:24

## 2019-04-23 RX ADMIN — POLYETHYLENE GLYCOL 3350 17 GRAM(S): 17 POWDER, FOR SOLUTION ORAL at 14:38

## 2019-04-23 RX ADMIN — AZITHROMYCIN 255 MILLIGRAM(S): 500 TABLET, FILM COATED ORAL at 20:39

## 2019-04-23 RX ADMIN — Medication 20 MILLIEQUIVALENT(S): at 05:43

## 2019-04-23 RX ADMIN — Medication 2: at 21:59

## 2019-04-23 RX ADMIN — Medication 1 PATCH: at 14:47

## 2019-04-23 RX ADMIN — Medication 20 MILLILITER(S): at 14:40

## 2019-04-23 RX ADMIN — Medication 50 MILLILITER(S): at 14:29

## 2019-04-23 RX ADMIN — OXYCODONE AND ACETAMINOPHEN 1 TABLET(S): 5; 325 TABLET ORAL at 03:25

## 2019-04-23 RX ADMIN — Medication 2: at 07:14

## 2019-04-23 RX ADMIN — HYDROMORPHONE HYDROCHLORIDE 1 MILLIGRAM(S): 2 INJECTION INTRAMUSCULAR; INTRAVENOUS; SUBCUTANEOUS at 14:47

## 2019-04-23 RX ADMIN — OXYCODONE AND ACETAMINOPHEN 1 TABLET(S): 5; 325 TABLET ORAL at 03:55

## 2019-04-23 RX ADMIN — HEPARIN SODIUM 5000 UNIT(S): 5000 INJECTION INTRAVENOUS; SUBCUTANEOUS at 05:44

## 2019-04-23 RX ADMIN — Medication 250 MILLIGRAM(S): at 05:45

## 2019-04-23 RX ADMIN — Medication 1 PATCH: at 07:04

## 2019-04-23 RX ADMIN — Medication 650 MILLIGRAM(S): at 05:44

## 2019-04-23 RX ADMIN — Medication 650 MILLIGRAM(S): at 14:48

## 2019-04-23 RX ADMIN — HEPARIN SODIUM 5000 UNIT(S): 5000 INJECTION INTRAVENOUS; SUBCUTANEOUS at 21:26

## 2019-04-23 RX ADMIN — MIDAZOLAM HYDROCHLORIDE 3 MILLIGRAM(S): 1 INJECTION, SOLUTION INTRAMUSCULAR; INTRAVENOUS at 14:46

## 2019-04-23 RX ADMIN — LIDOCAINE 1 APPLICATION(S): 4 CREAM TOPICAL at 14:45

## 2019-04-23 RX ADMIN — Medication 60 MILLIGRAM(S): at 05:44

## 2019-04-23 RX ADMIN — LEVALBUTEROL 0.63 MILLIGRAM(S): 1.25 SOLUTION, CONCENTRATE RESPIRATORY (INHALATION) at 01:04

## 2019-04-23 RX ADMIN — Medication 1 PATCH: at 20:39

## 2019-04-23 RX ADMIN — Medication 650 MILLIGRAM(S): at 11:28

## 2019-04-23 RX ADMIN — Medication 1 PATCH: at 14:37

## 2019-04-23 RX ADMIN — HEPARIN SODIUM 5000 UNIT(S): 5000 INJECTION INTRAVENOUS; SUBCUTANEOUS at 14:43

## 2019-04-23 RX ADMIN — OXYCODONE AND ACETAMINOPHEN 1 TABLET(S): 5; 325 TABLET ORAL at 08:53

## 2019-04-23 RX ADMIN — Medication 81 MILLIGRAM(S): at 14:37

## 2019-04-23 RX ADMIN — OXYCODONE AND ACETAMINOPHEN 1 TABLET(S): 5; 325 TABLET ORAL at 21:26

## 2019-04-23 RX ADMIN — Medication 60 MILLIGRAM(S): at 21:26

## 2019-04-23 RX ADMIN — PIPERACILLIN AND TAZOBACTAM 200 GRAM(S): 4; .5 INJECTION, POWDER, LYOPHILIZED, FOR SOLUTION INTRAVENOUS at 15:25

## 2019-04-23 RX ADMIN — ATORVASTATIN CALCIUM 80 MILLIGRAM(S): 80 TABLET, FILM COATED ORAL at 21:25

## 2019-04-23 RX ADMIN — Medication 250 MILLIGRAM(S): at 19:22

## 2019-04-23 RX ADMIN — HYDROMORPHONE HYDROCHLORIDE 1 MILLIGRAM(S): 2 INJECTION INTRAMUSCULAR; INTRAVENOUS; SUBCUTANEOUS at 14:44

## 2019-04-23 RX ADMIN — HYDROMORPHONE HYDROCHLORIDE 1 MILLIGRAM(S): 2 INJECTION INTRAMUSCULAR; INTRAVENOUS; SUBCUTANEOUS at 14:39

## 2019-04-23 RX ADMIN — Medication 500 MICROGRAM(S): at 05:56

## 2019-04-23 RX ADMIN — DEXMEDETOMIDINE HYDROCHLORIDE IN 0.9% SODIUM CHLORIDE 3.5 MICROGRAM(S)/KG/HR: 4 INJECTION INTRAVENOUS at 15:37

## 2019-04-23 RX ADMIN — Medication 1 SPRAY(S): at 14:46

## 2019-04-23 RX ADMIN — Medication 0.5 MILLIGRAM(S): at 10:51

## 2019-04-23 RX ADMIN — OXYCODONE AND ACETAMINOPHEN 1 TABLET(S): 5; 325 TABLET ORAL at 22:21

## 2019-04-23 RX ADMIN — Medication 500 MICROGRAM(S): at 11:15

## 2019-04-23 RX ADMIN — Medication 650 MILLIGRAM(S): at 06:15

## 2019-04-23 RX ADMIN — Medication 20 MILLIEQUIVALENT(S): at 19:24

## 2019-04-23 RX ADMIN — PIPERACILLIN AND TAZOBACTAM 200 GRAM(S): 4; .5 INJECTION, POWDER, LYOPHILIZED, FOR SOLUTION INTRAVENOUS at 19:22

## 2019-04-23 RX ADMIN — LEVALBUTEROL 0.63 MILLIGRAM(S): 1.25 SOLUTION, CONCENTRATE RESPIRATORY (INHALATION) at 05:55

## 2019-04-23 RX ADMIN — OXYCODONE AND ACETAMINOPHEN 1 TABLET(S): 5; 325 TABLET ORAL at 11:12

## 2019-04-23 NOTE — PROGRESS NOTE ADULT - SUBJECTIVE AND OBJECTIVE BOX
Interval Events: Reviewed  Patient seen and examined at bedside.    Patient is a 49y old  Male who presents with a chief complaint of CABG (22 Apr 2019 23:45)    he is doing better and the lung seems less congested  PAST MEDICAL & SURGICAL HISTORY:  No pertinent past medical history  H/O cardiac catheterization      MEDICATIONS:  Pulmonary:  buDESOnide   0.5 milliGRAM(s) Respule 0.5 milliGRAM(s) Nebulizer two times a day  ipratropium    for Nebulization 500 MICROGram(s) Nebulizer every 6 hours    Antimicrobials:  azithromycin  IVPB 500 milliGRAM(s) IV Intermittent every 24 hours  azithromycin  IVPB      piperacillin/tazobactam IVPB. 3.375 Gram(s) IV Intermittent every 6 hours  vancomycin  IVPB 1000 milliGRAM(s) IV Intermittent every 12 hours    Anticoagulants:  aspirin enteric coated 81 milliGRAM(s) Oral daily  heparin  Injectable 5000 Unit(s) SubCutaneous every 8 hours    Cardiac:  diltiazem    Tablet 60 milliGRAM(s) Oral every 8 hours      Allergies    No Known Allergies    Intolerances        Vital Signs Last 24 Hrs  T(C): 37 (23 Apr 2019 21:07), Max: 37 (23 Apr 2019 21:07)  T(F): 98.6 (23 Apr 2019 21:07), Max: 98.6 (23 Apr 2019 21:07)  HR: 100 (23 Apr 2019 21:00) (88 - 108)  BP: 97/63 (23 Apr 2019 20:00) (82/59 - 132/77)  BP(mean): 69 (23 Apr 2019 20:00) (65 - 94)  RR: 14 (23 Apr 2019 21:00) (14 - 42)  SpO2: 100% (23 Apr 2019 21:00) (91% - 100%)    04-22 @ 07:01 - 04-23 @ 07:00  --------------------------------------------------------  IN: 925 mL / OUT: 975 mL / NET: -50 mL    04-23 @ 07:01 - 04-23 @ 21:19  --------------------------------------------------------  IN: 502 mL / OUT: 350 mL / NET: 152 mL          LABS:  ABG - ( 22 Apr 2019 10:48 )  pH, Arterial: 7.46  pH, Blood: x     /  pCO2: 44    /  pO2: 63    / HCO3: 31    / Base Excess: 6.1   /  SaO2: 92                  CBC Full  -  ( 23 Apr 2019 10:43 )  WBC Count : 15.28 K/uL  RBC Count : 3.63 M/uL  Hemoglobin : 10.4 g/dL  Hematocrit : 31.5 %  Platelet Count - Automated : 343 K/uL  Mean Cell Volume : 86.8 fl  Mean Cell Hemoglobin : 28.7 pg  Mean Cell Hemoglobin Concentration : 33.0 gm/dL  Auto Neutrophil # : x  Auto Lymphocyte # : x  Auto Monocyte # : x  Auto Eosinophil # : x  Auto Basophil # : x  Auto Neutrophil % : x  Auto Lymphocyte % : x  Auto Monocyte % : x  Auto Eosinophil % : x  Auto Basophil % : x    04-23    136  |  96  |  15  ----------------------------<  79  3.8   |  27  |  0.76    Ca    8.9      23 Apr 2019 10:43  Phos  3.1     04-23  Mg     2.4     04-23    TPro  7.2  /  Alb  3.8  /  TBili  0.7  /  DBili  x   /  AST  29  /  ALT  27  /  AlkPhos  100  04-23    PT/INR - ( 23 Apr 2019 10:43 )   PT: 13.4 sec;   INR: 1.18          PTT - ( 23 Apr 2019 10:43 )  PTT:30.6 sec                Culture Results:   >100,000 CFU/ml Haemophilus influenzae  Susceptibility to follow. (04-22 @ 10:57)  < from: Xray Chest 1 View- PORTABLE-Urgent (04.23.19 @ 17:11) >  ******PRELIMINARY REPORT******    ******PRELIMINARY REPORT******            EXAM:  XR CHEST PORTABLE URGENT 1V                          PROCEDURE DATE:  04/23/2019    ******PRELIMINARY REPORT******    ******PRELIMINARY REPORT******              INTERPRETATION:  XR CHEST PORTABLE URGENT 1V dated 4/23/2019 5:11 PM.    INDICATION: Unstable angina. CABG. Pigtail removed.    COMPARISON: Chest radiograph 4/23/2019 at 1:40 PM.    FINDINGS:  Single AP view of the chest is submitted. There has been interval removal   of a right pigtail chest tube. Sternotomy wires, mediastinal clips and   epicardial pacing leads are similar to prior study. Stable   cardiomediastinal contours. Small bilateral pleural effusions and   bibasilar atelectasis, somewhat improved from prior study. Continued   asymmetric elevation of the left hemidiaphragm. No pneumothorax.      IMPRESSION:  1.  Discontinued right basilar pigtail chest tube without pneumothorax.  2.  Small bilateral pleural effusions and bibasilar atelectasis, mildly   improved from prior study.    < end of copied text >      RADIOLOGY & ADDITIONAL STUDIES (The following images were personally reviewed):  Jose:                                     No  Urine output:                       adequate  DVT prophylaxis:                 Yes  Flattus:                                  Yes  Bowel movement:              No

## 2019-04-23 NOTE — PROGRESS NOTE ADULT - SUBJECTIVE AND OBJECTIVE BOX
FREDY LUJAN   MRN#: [id number]     The patient is a 49y Male who was seen, evaluated, & examined with the CTICU staff post-operatively with a multidisciplinary care plan formulated & implemented.  All available clinical, laboratory, radiographic, pharmacologic, and electrocardiographic data were reviewed & analyzed.      The patient was in the CTICU in critical condition secondary to:     persistent cardiopulmonary dysfunction    For support and evaluation & prevention of further decompensation secondary to persistent cardiopulmonary dysfunction, respiratory status required:     supplemental oxygen with BiPaP / high flow nasal cannula  continuous pulse oximetry monitoring  following ABGs with A-line monitoring    Invasive hemodynamic monitoring with an A-line was required for continuous MAP/BP monitoring to ensure adequate cardiovascular support and to evaluate for & help prevent decompensation    In addition:  Copious thick secretions on Zosyn/Azithro s/p bronch with cultures sent; followed by Pulmonary - less secretions seen on repeat bronch today  Poor but improving inspiratory effort - cycles between HFNC and BIPAP, likely needs PEEP to recruit collapsed lung  Ambulated x2 today with BIPAP - will attempt again this evening    The patient required critical care management and I provided 30 minutes of non-continuous care to the patient in addition to  discussing the patient and plan at length with the CTICU staff and helping coordinate care.

## 2019-04-23 NOTE — PROGRESS NOTE ADULT - SUBJECTIVE AND OBJECTIVE BOX
CTICU  CRITICAL  CARE  attending     Hand off received 					   Pertinent clinical, laboratory, radiographic, hemodynamic, echocardiographic, respiratory data, microbiologic data and chart were reviewed and analyzed frequently throughout the course of the day and night  Patient seen and examined with CTS/ SH attending at bedside  Pt is a 49y , Male, HEALTH ISSUES - PROBLEM Dx:  Tracheobronchitis: Tracheobronchitis  Centrilobular emphysema: Centrilobular emphysema  Leukocytosis: Leukocytosis  Unstable angina: Unstable angina      , FAMILY HISTORY:  Family history of myocardial infarction: Father (age unknown)  PAST MEDICAL & SURGICAL HISTORY:  No pertinent past medical history  H/O cardiac catheterization    Patient is a 49y old  Male who presents with a chief complaint of Unstable Angina (24 Apr 2019 16:34)      14 system review was unremarkable  acute changes include acute respiratory failure  Vital signs, hemodynamic and respiratory parameters were reviewed from the bedside nursing flowsheet.  ICU Vital Signs Last 24 Hrs  T(C): 36.8 (25 Apr 2019 13:27), Max: 36.9 (24 Apr 2019 20:47)  T(F): 98.2 (25 Apr 2019 13:27), Max: 98.4 (24 Apr 2019 20:47)  HR: 102 (25 Apr 2019 13:00) (81 - 106)  BP: 101/62 (25 Apr 2019 13:00) (86/62 - 116/71)  BP(mean): 72 (25 Apr 2019 13:00) (68 - 84)  ABP: --  ABP(mean): --  RR: 31 (25 Apr 2019 13:00) (17 - 33)  SpO2: 95% (25 Apr 2019 13:00) (92% - 100%)    Adult Advanced Hemodynamics Last 24 Hrs  CVP(mm Hg): --  CVP(cm H2O): --  CO: --  CI: --  PA: --  PA(mean): --  PCWP: --  SVR: --  SVRI: --  PVR: --  PVRI: --,     Intake and output was reviewed and the fluid balance was calculated  Daily     Daily   I&O's Summary    24 Apr 2019 07:01  -  25 Apr 2019 07:00  --------------------------------------------------------  IN: 800 mL / OUT: 750 mL / NET: 50 mL    25 Apr 2019 07:01  -  25 Apr 2019 13:34  --------------------------------------------------------  IN: 400 mL / OUT: 200 mL / NET: 200 mL        All lines and drain sites were assessed  [facility]      POCT Blood Glucose.: 131 mg/dL (25 Apr 2019 11:01)    No acute change in mental status  Auscultation of the chest reveals equal bs  Abdomen is soft  Extremities are warm and well perfused  Wounds appear clean and unremarkable  Antibiotics are periop    labs  CBC Full  -  ( 25 Apr 2019 04:08 )  WBC Count : 14.28 K/uL  RBC Count : 3.91 M/uL  Hemoglobin : 11.1 g/dL  Hematocrit : 33.8 %  Platelet Count - Automated : 426 K/uL  Mean Cell Volume : 86.4 fl  Mean Cell Hemoglobin : 28.4 pg  Mean Cell Hemoglobin Concentration : 32.8 gm/dL  Auto Neutrophil # : x  Auto Lymphocyte # : x  Auto Monocyte # : x  Auto Eosinophil # : x  Auto Basophil # : x  Auto Neutrophil % : x  Auto Lymphocyte % : x  Auto Monocyte % : x  Auto Eosinophil % : x  Auto Basophil % : x    04-25    137  |  102  |  15  ----------------------------<  110<H>  4.4   |  24  |  0.76    Ca    9.2      25 Apr 2019 04:08  Phos  3.3     04-25  Mg     2.3     04-25    TPro  7.5  /  Alb  3.7  /  TBili  0.9  /  DBili  x   /  AST  31  /  ALT  39  /  AlkPhos  168<H>  04-25    PT/INR - ( 25 Apr 2019 04:08 )   PT: 14.0 sec;   INR: 1.23          PTT - ( 25 Apr 2019 04:08 )  PTT:31.1 sec  The current medications were reviewed   MEDICATIONS  (STANDING):  aspirin enteric coated 81 milliGRAM(s) Oral daily  atorvastatin 80 milliGRAM(s) Oral at bedtime  buDESOnide   0.5 milliGRAM(s) Respule 0.5 milliGRAM(s) Nebulizer two times a day  cefTRIAXone   IVPB 1 Gram(s) IV Intermittent every 24 hours  cefTRIAXone   IVPB      chlorhexidine 2% Cloths 1 Application(s) Topical daily  dextrose 5%. 1000 milliLiter(s) (50 mL/Hr) IV Continuous <Continuous>  dextrose 50% Injectable 50 milliLiter(s) IV Push every 15 minutes  dextrose 50% Injectable 25 milliLiter(s) IV Push every 15 minutes  diltiazem    Tablet 60 milliGRAM(s) Oral every 6 hours  docusate sodium 100 milliGRAM(s) Oral two times a day  heparin  Injectable 5000 Unit(s) SubCutaneous every 8 hours  HYDROmorphone  Injectable 0.5 milliGRAM(s) IV Push once  insulin lispro (HumaLOG) corrective regimen sliding scale   SubCutaneous Before meals and at bedtime  ipratropium    for Nebulization 500 MICROGram(s) Nebulizer every 6 hours  nicotine - 21 mG/24Hr(s) Patch 1 patch Transdermal daily  pantoprazole    Tablet 40 milliGRAM(s) Oral before breakfast  polyethylene glycol 3350 17 Gram(s) Oral daily  sodium chloride 0.9%. 1000 milliLiter(s) (10 mL/Hr) IV Continuous <Continuous>    MEDICATIONS  (PRN):  acetaminophen   Tablet .. 650 milliGRAM(s) Oral every 6 hours PRN Mild Pain (1 - 3)  dextrose 40% Gel 15 Gram(s) Oral once PRN Blood Glucose LESS THAN 70 milliGRAM(s)/deciliter  glucagon  Injectable 1 milliGRAM(s) IntraMuscular once PRN Glucose LESS THAN 70 milligrams/deciliter  oxyCODONE    5 mG/acetaminophen 325 mG 1 Tablet(s) Oral every 6 hours PRN Moderate Pain (4 - 6)       PROBLEM LIST/ ASSESSMENT:  HEALTH ISSUES - PROBLEM Dx:  Tracheobronchitis: Tracheobronchitis  Centrilobular emphysema: Centrilobular emphysema  Leukocytosis: Leukocytosis  Unstable angina: Unstable angina      ,   Patient is a 49y old  Male who presents with a chief complaint of Unstable Angina (24 Apr 2019 16:34)     s/p cardiac surgery          My plan includes :  close hemodynamic, ventilatory and drain monitoring and management per post op routine    Monitor for arrhythmias and monitor parameters for organ perfusion  beta blockade not administered due to hemodynamic instability and bradycardia  monitor neurologic status  Head of the bed should remain elevated to 45 deg .   chest PT and IS will be encouraged  monitor adequacy of oxygenation and ventilation and attempt to wean oxygen  antibiotic regimen will be tailored to the clinical, laboratory and microbiologic data  Nutritional goals will be met using po eventually , ensure adequate caloric intake and montior the same  Stress ulcer and VTE prophylaxis will be achieved    Glycemic control is satisfactory  Electrolytes have been repleted as necessary and wound care has been carried out. Pain control has been achieved.   agressive physical therapy and early mobility and ambulation goals will be met   The family was updated about the course and plan  CRITICAL CARE TIME SPENT in evaluation and management, reassessments, review and interpretation of labs and x-rays, ventilator and hemodynamic management, formulating a plan and coordinating care: ___90____ MIN.  Time does not include procedural time.  CTICU ATTENDING     					    Demarco Moya MD

## 2019-04-24 LAB
ALBUMIN SERPL ELPH-MCNC: 3.6 G/DL — SIGNIFICANT CHANGE UP (ref 3.3–5)
ALP SERPL-CCNC: 138 U/L — HIGH (ref 40–120)
ALT FLD-CCNC: 38 U/L — SIGNIFICANT CHANGE UP (ref 10–45)
ANION GAP SERPL CALC-SCNC: 12 MMOL/L — SIGNIFICANT CHANGE UP (ref 5–17)
APTT BLD: 28.3 SEC — SIGNIFICANT CHANGE UP (ref 27.5–36.3)
AST SERPL-CCNC: 35 U/L — SIGNIFICANT CHANGE UP (ref 10–40)
BILIRUB SERPL-MCNC: 1 MG/DL — SIGNIFICANT CHANGE UP (ref 0.2–1.2)
BUN SERPL-MCNC: 14 MG/DL — SIGNIFICANT CHANGE UP (ref 7–23)
CALCIUM SERPL-MCNC: 9.1 MG/DL — SIGNIFICANT CHANGE UP (ref 8.4–10.5)
CHLORIDE SERPL-SCNC: 99 MMOL/L — SIGNIFICANT CHANGE UP (ref 96–108)
CO2 SERPL-SCNC: 25 MMOL/L — SIGNIFICANT CHANGE UP (ref 22–31)
CREAT SERPL-MCNC: 0.83 MG/DL — SIGNIFICANT CHANGE UP (ref 0.5–1.3)
GLUCOSE BLDC GLUCOMTR-MCNC: 117 MG/DL — HIGH (ref 70–99)
GLUCOSE BLDC GLUCOMTR-MCNC: 136 MG/DL — HIGH (ref 70–99)
GLUCOSE BLDC GLUCOMTR-MCNC: 93 MG/DL — SIGNIFICANT CHANGE UP (ref 70–99)
GLUCOSE BLDC GLUCOMTR-MCNC: 97 MG/DL — SIGNIFICANT CHANGE UP (ref 70–99)
GLUCOSE SERPL-MCNC: 87 MG/DL — SIGNIFICANT CHANGE UP (ref 70–99)
GRAM STN FLD: SIGNIFICANT CHANGE UP
HCT VFR BLD CALC: 31.4 % — LOW (ref 39–50)
HGB BLD-MCNC: 10.2 G/DL — LOW (ref 13–17)
INR BLD: 1.19 — HIGH (ref 0.88–1.16)
MAGNESIUM SERPL-MCNC: 2.1 MG/DL — SIGNIFICANT CHANGE UP (ref 1.6–2.6)
MCHC RBC-ENTMCNC: 28.3 PG — SIGNIFICANT CHANGE UP (ref 27–34)
MCHC RBC-ENTMCNC: 32.5 GM/DL — SIGNIFICANT CHANGE UP (ref 32–36)
MCV RBC AUTO: 87.2 FL — SIGNIFICANT CHANGE UP (ref 80–100)
NRBC # BLD: 0 /100 WBCS — SIGNIFICANT CHANGE UP (ref 0–0)
PHOSPHATE SERPL-MCNC: 3.2 MG/DL — SIGNIFICANT CHANGE UP (ref 2.5–4.5)
PLATELET # BLD AUTO: 379 K/UL — SIGNIFICANT CHANGE UP (ref 150–400)
POTASSIUM SERPL-MCNC: 4.5 MMOL/L — SIGNIFICANT CHANGE UP (ref 3.5–5.3)
POTASSIUM SERPL-SCNC: 4.5 MMOL/L — SIGNIFICANT CHANGE UP (ref 3.5–5.3)
PROT SERPL-MCNC: 7.1 G/DL — SIGNIFICANT CHANGE UP (ref 6–8.3)
PROTHROM AB SERPL-ACNC: 13.5 SEC — HIGH (ref 10–12.9)
RBC # BLD: 3.6 M/UL — LOW (ref 4.2–5.8)
RBC # FLD: 15 % — HIGH (ref 10.3–14.5)
SODIUM SERPL-SCNC: 136 MMOL/L — SIGNIFICANT CHANGE UP (ref 135–145)
SPECIMEN SOURCE: SIGNIFICANT CHANGE UP
VANCOMYCIN TROUGH SERPL-MCNC: 9.9 UG/ML — LOW (ref 10–20)
WBC # BLD: 12.4 K/UL — HIGH (ref 3.8–10.5)
WBC # FLD AUTO: 12.4 K/UL — HIGH (ref 3.8–10.5)

## 2019-04-24 PROCEDURE — 99232 SBSQ HOSP IP/OBS MODERATE 35: CPT

## 2019-04-24 PROCEDURE — 99232 SBSQ HOSP IP/OBS MODERATE 35: CPT | Mod: GC

## 2019-04-24 PROCEDURE — 71045 X-RAY EXAM CHEST 1 VIEW: CPT | Mod: 26

## 2019-04-24 RX ORDER — VANCOMYCIN HCL 1 G
1250 VIAL (EA) INTRAVENOUS EVERY 12 HOURS
Qty: 0 | Refills: 0 | Status: DISCONTINUED | OUTPATIENT
Start: 2019-04-24 | End: 2019-04-24

## 2019-04-24 RX ORDER — CEFTRIAXONE 500 MG/1
1 INJECTION, POWDER, FOR SOLUTION INTRAMUSCULAR; INTRAVENOUS EVERY 24 HOURS
Qty: 0 | Refills: 0 | Status: DISCONTINUED | OUTPATIENT
Start: 2019-04-25 | End: 2019-04-26

## 2019-04-24 RX ORDER — DOCUSATE SODIUM 100 MG
100 CAPSULE ORAL
Qty: 0 | Refills: 0 | Status: DISCONTINUED | OUTPATIENT
Start: 2019-04-24 | End: 2019-04-27

## 2019-04-24 RX ORDER — CEFTRIAXONE 500 MG/1
1 INJECTION, POWDER, FOR SOLUTION INTRAMUSCULAR; INTRAVENOUS ONCE
Qty: 0 | Refills: 0 | Status: COMPLETED | OUTPATIENT
Start: 2019-04-24 | End: 2019-04-24

## 2019-04-24 RX ORDER — DILTIAZEM HCL 120 MG
60 CAPSULE, EXT RELEASE 24 HR ORAL EVERY 6 HOURS
Qty: 0 | Refills: 0 | Status: DISCONTINUED | OUTPATIENT
Start: 2019-04-24 | End: 2019-04-27

## 2019-04-24 RX ORDER — CEFTRIAXONE 500 MG/1
INJECTION, POWDER, FOR SOLUTION INTRAMUSCULAR; INTRAVENOUS
Qty: 0 | Refills: 0 | Status: DISCONTINUED | OUTPATIENT
Start: 2019-04-24 | End: 2019-04-26

## 2019-04-24 RX ADMIN — HEPARIN SODIUM 5000 UNIT(S): 5000 INJECTION INTRAVENOUS; SUBCUTANEOUS at 13:10

## 2019-04-24 RX ADMIN — POLYETHYLENE GLYCOL 3350 17 GRAM(S): 17 POWDER, FOR SOLUTION ORAL at 11:10

## 2019-04-24 RX ADMIN — Medication 60 MILLIGRAM(S): at 17:42

## 2019-04-24 RX ADMIN — HEPARIN SODIUM 5000 UNIT(S): 5000 INJECTION INTRAVENOUS; SUBCUTANEOUS at 05:19

## 2019-04-24 RX ADMIN — Medication 60 MILLIGRAM(S): at 11:10

## 2019-04-24 RX ADMIN — Medication 500 MICROGRAM(S): at 18:47

## 2019-04-24 RX ADMIN — Medication 81 MILLIGRAM(S): at 11:10

## 2019-04-24 RX ADMIN — OXYCODONE AND ACETAMINOPHEN 1 TABLET(S): 5; 325 TABLET ORAL at 16:45

## 2019-04-24 RX ADMIN — Medication 166.67 MILLIGRAM(S): at 07:22

## 2019-04-24 RX ADMIN — OXYCODONE AND ACETAMINOPHEN 1 TABLET(S): 5; 325 TABLET ORAL at 04:25

## 2019-04-24 RX ADMIN — PIPERACILLIN AND TAZOBACTAM 200 GRAM(S): 4; .5 INJECTION, POWDER, LYOPHILIZED, FOR SOLUTION INTRAVENOUS at 05:19

## 2019-04-24 RX ADMIN — Medication 650 MILLIGRAM(S): at 09:58

## 2019-04-24 RX ADMIN — Medication 1 PATCH: at 19:58

## 2019-04-24 RX ADMIN — HEPARIN SODIUM 5000 UNIT(S): 5000 INJECTION INTRAVENOUS; SUBCUTANEOUS at 21:02

## 2019-04-24 RX ADMIN — Medication 500 MICROGRAM(S): at 06:35

## 2019-04-24 RX ADMIN — PIPERACILLIN AND TAZOBACTAM 200 GRAM(S): 4; .5 INJECTION, POWDER, LYOPHILIZED, FOR SOLUTION INTRAVENOUS at 11:09

## 2019-04-24 RX ADMIN — Medication 1 PATCH: at 06:58

## 2019-04-24 RX ADMIN — OXYCODONE AND ACETAMINOPHEN 1 TABLET(S): 5; 325 TABLET ORAL at 05:17

## 2019-04-24 RX ADMIN — Medication 100 MILLIGRAM(S): at 17:42

## 2019-04-24 RX ADMIN — Medication 0.5 MILLIGRAM(S): at 09:08

## 2019-04-24 RX ADMIN — PIPERACILLIN AND TAZOBACTAM 200 GRAM(S): 4; .5 INJECTION, POWDER, LYOPHILIZED, FOR SOLUTION INTRAVENOUS at 00:53

## 2019-04-24 RX ADMIN — OXYCODONE AND ACETAMINOPHEN 1 TABLET(S): 5; 325 TABLET ORAL at 17:03

## 2019-04-24 RX ADMIN — ATORVASTATIN CALCIUM 80 MILLIGRAM(S): 80 TABLET, FILM COATED ORAL at 21:02

## 2019-04-24 RX ADMIN — CEFTRIAXONE 100 GRAM(S): 500 INJECTION, POWDER, FOR SOLUTION INTRAMUSCULAR; INTRAVENOUS at 15:29

## 2019-04-24 RX ADMIN — Medication 60 MILLIGRAM(S): at 07:22

## 2019-04-24 RX ADMIN — Medication 500 MICROGRAM(S): at 12:25

## 2019-04-24 RX ADMIN — Medication 1 PATCH: at 11:10

## 2019-04-24 RX ADMIN — Medication 500 MICROGRAM(S): at 01:21

## 2019-04-24 RX ADMIN — CHLORHEXIDINE GLUCONATE 1 APPLICATION(S): 213 SOLUTION TOPICAL at 11:03

## 2019-04-24 RX ADMIN — Medication 650 MILLIGRAM(S): at 09:08

## 2019-04-24 RX ADMIN — PANTOPRAZOLE SODIUM 40 MILLIGRAM(S): 20 TABLET, DELAYED RELEASE ORAL at 05:20
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
## 2019-04-24 NOTE — CHART NOTE - NSCHARTNOTEFT_GEN_A_CORE
Admitting Diagnosis:   Patient is a 49y old  Male who presents with a chief complaint of Unstable Angina (24 Apr 2019 12:44)      PAST MEDICAL & SURGICAL HISTORY:  No pertinent past medical history  H/O cardiac catheterization      Current Nutrition Order: DASH/TLC     PO Intake: Good (%) [   ]  Fair (50-75%) [ x  ] Poor (<25%) [   ]    GI Issues: Reports constipation (received miralax), last BM 4/19, denies N/V    Pain: Not reporting pain at this time     Skin Integrity: 1+ generalized edema 4/24, +surgical incision    Labs:   04-24    136  |  99  |  14  ----------------------------<  87  4.5   |  25  |  0.83    Ca    9.1      24 Apr 2019 03:55  Phos  3.2     04-24  Mg     2.1     04-24    TPro  7.1  /  Alb  3.6  /  TBili  1.0  /  DBili  x   /  AST  35  /  ALT  38  /  AlkPhos  138<H>  04-24    CAPILLARY BLOOD GLUCOSE      POCT Blood Glucose.: 93 mg/dL (24 Apr 2019 11:00)  POCT Blood Glucose.: 117 mg/dL (24 Apr 2019 08:18)  POCT Blood Glucose.: 191 mg/dL (23 Apr 2019 21:50)  POCT Blood Glucose.: 102 mg/dL (23 Apr 2019 17:41)      Medications:  MEDICATIONS  (STANDING):  aspirin enteric coated 81 milliGRAM(s) Oral daily  atorvastatin 80 milliGRAM(s) Oral at bedtime  azithromycin  IVPB 500 milliGRAM(s) IV Intermittent every 24 hours  azithromycin  IVPB      buDESOnide   0.5 milliGRAM(s) Respule 0.5 milliGRAM(s) Nebulizer two times a day  chlorhexidine 2% Cloths 1 Application(s) Topical daily  dextrose 5%. 1000 milliLiter(s) (50 mL/Hr) IV Continuous <Continuous>  dextrose 50% Injectable 50 milliLiter(s) IV Push every 15 minutes  dextrose 50% Injectable 25 milliLiter(s) IV Push every 15 minutes  diltiazem    Tablet 60 milliGRAM(s) Oral every 6 hours  heparin  Injectable 5000 Unit(s) SubCutaneous every 8 hours  HYDROmorphone  Injectable 0.5 milliGRAM(s) IV Push once  insulin lispro (HumaLOG) corrective regimen sliding scale   SubCutaneous Before meals and at bedtime  ipratropium    for Nebulization 500 MICROGram(s) Nebulizer every 6 hours  nicotine - 21 mG/24Hr(s) Patch 1 patch Transdermal daily  pantoprazole    Tablet 40 milliGRAM(s) Oral before breakfast  piperacillin/tazobactam IVPB. 3.375 Gram(s) IV Intermittent every 6 hours  polyethylene glycol 3350 17 Gram(s) Oral daily  sodium chloride 0.9%. 1000 milliLiter(s) (10 mL/Hr) IV Continuous <Continuous>  vancomycin  IVPB 1250 milliGRAM(s) IV Intermittent every 12 hours    MEDICATIONS  (PRN):  acetaminophen   Tablet .. 650 milliGRAM(s) Oral every 6 hours PRN Mild Pain (1 - 3)  dextrose 40% Gel 15 Gram(s) Oral once PRN Blood Glucose LESS THAN 70 milliGRAM(s)/deciliter  glucagon  Injectable 1 milliGRAM(s) IntraMuscular once PRN Glucose LESS THAN 70 milligrams/deciliter  oxyCODONE    5 mG/acetaminophen 325 mG 1 Tablet(s) Oral every 6 hours PRN Moderate Pain (4 - 6)      Weight:  70kg- admit wt    Weight Change: No new wts to assess    Nutrition Focused Physical Exam: Completed [   ]  Not Pertinent [  x ]    Estimated energy needs:   Ht: 5'7, Wt: 70kg, IBW: 67.3kg, 104%IBW, BMI 24.2kg/m2.   Needs calculated based on [facility] standards of care. Needs adjusted for post op.  25-30kcal/kg= 1750-2100kcal  1.2-1.4gms pro/kg= 84-98gms pro  25-30ml water/kg= 1750-2100ml water    Subjective:   Pt seen for followup. 48y/o M admitted with unstable angina pre-op CABG. S/P CABG x4 4/19, Extubated 4/19. Pt was in the CTICU in critical condition secondary to persistent cardiopulmonary dysfunction, cycles between HFNC and BIPAP. Pt seen in room, awake, alert. Pt noted [place] speaking, [facility]  services used, ID: [id number]. Pt is on DASH/TLC diet w/ ~50% PO intake 2/2 decreased appetite. Declining all ONS at this time. Reports good appetite PTA, avoids meat, dairy, eggs, however consumes fish. NKFA, no trouble chewing/swallowing. Reports wt loss over past year: 78kg 1 yr ago, 70kg x6 months ago, however reports that wt has been stable over past 6 months, current wt 70kg. RD to follow up per protocol.     Previous Nutrition Diagnosis:  Inadequate energy intake RT NPO for the OR AEB pt meeting 0% of EER    Active [   ]  Resolved [ x  ]    If resolved, new PES:   Increased nutrient needs (kcal and pro) RT promotion of healing post surgical intervention AEB S/P CABG x4 vessels    Goal: Pt to consume >80% EER    Recommendations:  1. Continue DASH/TLC diet (texture per SLP)  2. Honor food preferences as requested by pt (w/in therapeutic diet restrictions)  3. Weekly wts    Education: Discussed increased needs. Encouraged PO intake, encouraged consumption of protein rich foods. Encouraged small frequent meals when appetite is lacking. DASH/TLC diet education: 1. Home cooked meals when possible, 2. Avoidance of processed foods, 3. Avoidance of salt shaker, 4. Minimal consumption of foods high in saturated fats, examples of foods provided.     Risk Level: High [  x ] Moderate [   ] Low [   ]

## 2019-04-24 NOTE — PROGRESS NOTE ADULT - SUBJECTIVE AND OBJECTIVE BOX
Interval Events: Reviewed  Patient seen and examined at bedside.    Patient is a 49y old  Male who presents with a chief complaint of CABG (23 Apr 2019 21:23)  he sis feeling better and coughing less but the wound still hurts    PAST MEDICAL & SURGICAL HISTORY:  No pertinent past medical history  H/O cardiac catheterization      MEDICATIONS:  Pulmonary:  buDESOnide   0.5 milliGRAM(s) Respule 0.5 milliGRAM(s) Nebulizer two times a day  ipratropium    for Nebulization 500 MICROGram(s) Nebulizer every 6 hours    Antimicrobials:  azithromycin  IVPB 500 milliGRAM(s) IV Intermittent every 24 hours  azithromycin  IVPB      piperacillin/tazobactam IVPB. 3.375 Gram(s) IV Intermittent every 6 hours  vancomycin  IVPB 1250 milliGRAM(s) IV Intermittent every 12 hours    Anticoagulants:  aspirin enteric coated 81 milliGRAM(s) Oral daily  heparin  Injectable 5000 Unit(s) SubCutaneous every 8 hours    Cardiac:  diltiazem    Tablet 60 milliGRAM(s) Oral every 6 hours      Allergies    No Known Allergies    Intolerances        Vital Signs Last 24 Hrs  T(C): 37 (24 Apr 2019 09:44), Max: 37 (23 Apr 2019 21:07)  T(F): 98.6 (24 Apr 2019 09:44), Max: 98.6 (23 Apr 2019 21:07)  HR: 98 (24 Apr 2019 12:00) (90 - 102)  BP: 113/65 (24 Apr 2019 12:00) (82/59 - 113/65)  BP(mean): 77 (24 Apr 2019 11:00) (65 - 84)  RR: 30 (24 Apr 2019 12:00) (14 - 33)  SpO2: 95% (24 Apr 2019 12:00) (94% - 100%)    04-23 @ 07:01  -  04-24 @ 07:00  --------------------------------------------------------  IN: 1052 mL / OUT: 1100 mL / NET: -48 mL    04-24 @ 07:01 - 04-24 @ 12:44  --------------------------------------------------------  IN: 240 mL / OUT: 400 mL / NET: -160 mL          LABS:      CBC Full  -  ( 24 Apr 2019 03:55 )  WBC Count : 12.40 K/uL  RBC Count : 3.60 M/uL  Hemoglobin : 10.2 g/dL  Hematocrit : 31.4 %  Platelet Count - Automated : 379 K/uL  Mean Cell Volume : 87.2 fl  Mean Cell Hemoglobin : 28.3 pg  Mean Cell Hemoglobin Concentration : 32.5 gm/dL  Auto Neutrophil # : x  Auto Lymphocyte # : x  Auto Monocyte # : x  Auto Eosinophil # : x  Auto Basophil # : x  Auto Neutrophil % : x  Auto Lymphocyte % : x  Auto Monocyte % : x  Auto Eosinophil % : x  Auto Basophil % : x    04-24    136  |  99  |  14  ----------------------------<  87  4.5   |  25  |  0.83    Ca    9.1      24 Apr 2019 03:55  Phos  3.2     04-24  Mg     2.1     04-24    TPro  7.1  /  Alb  3.6  /  TBili  1.0  /  DBili  x   /  AST  35  /  ALT  38  /  AlkPhos  138<H>  04-24    PT/INR - ( 24 Apr 2019 05:18 )   PT: 13.5 sec;   INR: 1.19          PTT - ( 24 Apr 2019 05:18 )  PTT:28.3 sec    < from: Xray Chest 1 View- PORTABLE-Urgent (04.23.19 @ 17:11) >  EXAM:  XR CHEST PORTABLE URGENT 1V                          PROCEDURE DATE:  04/23/2019          INTERPRETATION:  Chest x-ray    Indication: Chest tube removal    A portable frontal view of the chest is compared to the prior study of   the same day. Right chest tube has been removed. Stable heart size. No   pneumothorax. Linear atelectatic changes in the lungs. Small left pleural   effusion. Cannot exclude trace right pleural effusion.      IMPRESSION: Removal right chest tube. No pneumothorax.    < end of copied text >                  RADIOLOGY & ADDITIONAL STUDIES (The following images were personally reviewed):  Jose:                                     No  Urine output:                       adequate  DVT prophylaxis:                 Yes  Flattus:                                  Yes  Bowel movement:              No

## 2019-04-25 LAB
-  AMPICILLIN/SULBACTAM: SIGNIFICANT CHANGE UP
-  AMPICILLIN: SIGNIFICANT CHANGE UP
-  AZITHROMYCIN: SIGNIFICANT CHANGE UP
-  CEFAZOLIN: SIGNIFICANT CHANGE UP
-  CEFAZOLIN: SIGNIFICANT CHANGE UP
-  CEFTRIAXONE: SIGNIFICANT CHANGE UP
-  CHLORAMPHENICOL: SIGNIFICANT CHANGE UP
-  CIPROFLOXACIN: SIGNIFICANT CHANGE UP
-  CLARITHROMYCIN: SIGNIFICANT CHANGE UP
-  CLINDAMYCIN: SIGNIFICANT CHANGE UP
-  ERYTHROMYCIN: SIGNIFICANT CHANGE UP
-  GENTAMICIN: SIGNIFICANT CHANGE UP
-  GENTAMICIN: SIGNIFICANT CHANGE UP
-  LEVOFLOXACIN: SIGNIFICANT CHANGE UP
-  PENICILLIN: SIGNIFICANT CHANGE UP
-  PIPERACILLIN/TAZOBACTAM: SIGNIFICANT CHANGE UP
-  PIPERACILLIN/TAZOBACTAM: SIGNIFICANT CHANGE UP
-  TOBRAMYCIN: SIGNIFICANT CHANGE UP
-  TOBRAMYCIN: SIGNIFICANT CHANGE UP
-  TRIMETHOPRIM/SULFAMETHOXAZOLE: SIGNIFICANT CHANGE UP
-  VANCOMYCIN: SIGNIFICANT CHANGE UP
ALBUMIN SERPL ELPH-MCNC: 3.7 G/DL — SIGNIFICANT CHANGE UP (ref 3.3–5)
ALP SERPL-CCNC: 168 U/L — HIGH (ref 40–120)
ALT FLD-CCNC: 39 U/L — SIGNIFICANT CHANGE UP (ref 10–45)
ANION GAP SERPL CALC-SCNC: 11 MMOL/L — SIGNIFICANT CHANGE UP (ref 5–17)
APTT BLD: 31.1 SEC — SIGNIFICANT CHANGE UP (ref 27.5–36.3)
AST SERPL-CCNC: 31 U/L — SIGNIFICANT CHANGE UP (ref 10–40)
BILIRUB SERPL-MCNC: 0.9 MG/DL — SIGNIFICANT CHANGE UP (ref 0.2–1.2)
BUN SERPL-MCNC: 15 MG/DL — SIGNIFICANT CHANGE UP (ref 7–23)
CALCIUM SERPL-MCNC: 9.2 MG/DL — SIGNIFICANT CHANGE UP (ref 8.4–10.5)
CHLORIDE SERPL-SCNC: 102 MMOL/L — SIGNIFICANT CHANGE UP (ref 96–108)
CO2 SERPL-SCNC: 24 MMOL/L — SIGNIFICANT CHANGE UP (ref 22–31)
CREAT SERPL-MCNC: 0.76 MG/DL — SIGNIFICANT CHANGE UP (ref 0.5–1.3)
GLUCOSE BLDC GLUCOMTR-MCNC: 131 MG/DL — HIGH (ref 70–99)
GLUCOSE BLDC GLUCOMTR-MCNC: 143 MG/DL — HIGH (ref 70–99)
GLUCOSE BLDC GLUCOMTR-MCNC: 166 MG/DL — HIGH (ref 70–99)
GLUCOSE BLDC GLUCOMTR-MCNC: 90 MG/DL — SIGNIFICANT CHANGE UP (ref 70–99)
GLUCOSE SERPL-MCNC: 110 MG/DL — HIGH (ref 70–99)
HCT VFR BLD CALC: 33.8 % — LOW (ref 39–50)
HGB BLD-MCNC: 11.1 G/DL — LOW (ref 13–17)
INR BLD: 1.23 — HIGH (ref 0.88–1.16)
MAGNESIUM SERPL-MCNC: 2.3 MG/DL — SIGNIFICANT CHANGE UP (ref 1.6–2.6)
MCHC RBC-ENTMCNC: 28.4 PG — SIGNIFICANT CHANGE UP (ref 27–34)
MCHC RBC-ENTMCNC: 32.8 GM/DL — SIGNIFICANT CHANGE UP (ref 32–36)
MCV RBC AUTO: 86.4 FL — SIGNIFICANT CHANGE UP (ref 80–100)
METHOD TYPE: SIGNIFICANT CHANGE UP
NRBC # BLD: 0 /100 WBCS — SIGNIFICANT CHANGE UP (ref 0–0)
PHOSPHATE SERPL-MCNC: 3.3 MG/DL — SIGNIFICANT CHANGE UP (ref 2.5–4.5)
PLATELET # BLD AUTO: 426 K/UL — HIGH (ref 150–400)
POTASSIUM SERPL-MCNC: 4.4 MMOL/L — SIGNIFICANT CHANGE UP (ref 3.5–5.3)
POTASSIUM SERPL-SCNC: 4.4 MMOL/L — SIGNIFICANT CHANGE UP (ref 3.5–5.3)
PROT SERPL-MCNC: 7.5 G/DL — SIGNIFICANT CHANGE UP (ref 6–8.3)
PROTHROM AB SERPL-ACNC: 14 SEC — HIGH (ref 10–12.9)
RBC # BLD: 3.91 M/UL — LOW (ref 4.2–5.8)
RBC # FLD: 15.3 % — HIGH (ref 10.3–14.5)
SODIUM SERPL-SCNC: 137 MMOL/L — SIGNIFICANT CHANGE UP (ref 135–145)
WBC # BLD: 14.28 K/UL — HIGH (ref 3.8–10.5)
WBC # FLD AUTO: 14.28 K/UL — HIGH (ref 3.8–10.5)

## 2019-04-25 PROCEDURE — 99232 SBSQ HOSP IP/OBS MODERATE 35: CPT

## 2019-04-25 PROCEDURE — 99232 SBSQ HOSP IP/OBS MODERATE 35: CPT | Mod: GC

## 2019-04-25 PROCEDURE — 71045 X-RAY EXAM CHEST 1 VIEW: CPT | Mod: 26

## 2019-04-25 RX ORDER — METOPROLOL TARTRATE 50 MG
12.5 TABLET ORAL EVERY 12 HOURS
Qty: 0 | Refills: 0 | Status: DISCONTINUED | OUTPATIENT
Start: 2019-04-25 | End: 2019-04-27

## 2019-04-25 RX ADMIN — Medication 2: at 16:37

## 2019-04-25 RX ADMIN — Medication 60 MILLIGRAM(S): at 05:44

## 2019-04-25 RX ADMIN — CHLORHEXIDINE GLUCONATE 1 APPLICATION(S): 213 SOLUTION TOPICAL at 11:06

## 2019-04-25 RX ADMIN — Medication 1 PATCH: at 11:06

## 2019-04-25 RX ADMIN — Medication 60 MILLIGRAM(S): at 11:12

## 2019-04-25 RX ADMIN — Medication 1 PATCH: at 11:13

## 2019-04-25 RX ADMIN — OXYCODONE AND ACETAMINOPHEN 1 TABLET(S): 5; 325 TABLET ORAL at 15:30

## 2019-04-25 RX ADMIN — Medication 500 MICROGRAM(S): at 16:36

## 2019-04-25 RX ADMIN — Medication 650 MILLIGRAM(S): at 11:00

## 2019-04-25 RX ADMIN — Medication 1 PATCH: at 07:34

## 2019-04-25 RX ADMIN — HEPARIN SODIUM 5000 UNIT(S): 5000 INJECTION INTRAVENOUS; SUBCUTANEOUS at 14:48

## 2019-04-25 RX ADMIN — POLYETHYLENE GLYCOL 3350 17 GRAM(S): 17 POWDER, FOR SOLUTION ORAL at 11:13

## 2019-04-25 RX ADMIN — Medication 12.5 MILLIGRAM(S): at 14:59

## 2019-04-25 RX ADMIN — OXYCODONE AND ACETAMINOPHEN 1 TABLET(S): 5; 325 TABLET ORAL at 00:26

## 2019-04-25 RX ADMIN — Medication 100 MILLIGRAM(S): at 05:45

## 2019-04-25 RX ADMIN — OXYCODONE AND ACETAMINOPHEN 1 TABLET(S): 5; 325 TABLET ORAL at 07:48

## 2019-04-25 RX ADMIN — Medication 0.5 MILLIGRAM(S): at 22:47

## 2019-04-25 RX ADMIN — Medication 0.5 MILLIGRAM(S): at 10:34

## 2019-04-25 RX ADMIN — HEPARIN SODIUM 5000 UNIT(S): 5000 INJECTION INTRAVENOUS; SUBCUTANEOUS at 21:08

## 2019-04-25 RX ADMIN — HEPARIN SODIUM 5000 UNIT(S): 5000 INJECTION INTRAVENOUS; SUBCUTANEOUS at 05:45

## 2019-04-25 RX ADMIN — Medication 60 MILLIGRAM(S): at 23:45

## 2019-04-25 RX ADMIN — OXYCODONE AND ACETAMINOPHEN 1 TABLET(S): 5; 325 TABLET ORAL at 14:59

## 2019-04-25 RX ADMIN — Medication 1 PATCH: at 18:32

## 2019-04-25 RX ADMIN — OXYCODONE AND ACETAMINOPHEN 1 TABLET(S): 5; 325 TABLET ORAL at 21:50

## 2019-04-25 RX ADMIN — Medication 81 MILLIGRAM(S): at 11:13

## 2019-04-25 RX ADMIN — CEFTRIAXONE 100 GRAM(S): 500 INJECTION, POWDER, FOR SOLUTION INTRAMUSCULAR; INTRAVENOUS at 14:48

## 2019-04-25 RX ADMIN — OXYCODONE AND ACETAMINOPHEN 1 TABLET(S): 5; 325 TABLET ORAL at 21:00

## 2019-04-25 RX ADMIN — Medication 60 MILLIGRAM(S): at 18:32

## 2019-04-25 RX ADMIN — OXYCODONE AND ACETAMINOPHEN 1 TABLET(S): 5; 325 TABLET ORAL at 01:02

## 2019-04-25 RX ADMIN — Medication 500 MICROGRAM(S): at 23:00

## 2019-04-25 RX ADMIN — ATORVASTATIN CALCIUM 80 MILLIGRAM(S): 80 TABLET, FILM COATED ORAL at 21:07

## 2019-04-25 RX ADMIN — Medication 500 MICROGRAM(S): at 07:01

## 2019-04-25 RX ADMIN — Medication 60 MILLIGRAM(S): at 00:26

## 2019-04-25 RX ADMIN — Medication 500 MICROGRAM(S): at 10:34

## 2019-04-25 RX ADMIN — Medication 650 MILLIGRAM(S): at 10:40

## 2019-04-25 RX ADMIN — OXYCODONE AND ACETAMINOPHEN 1 TABLET(S): 5; 325 TABLET ORAL at 06:49

## 2019-04-25 NOTE — PROGRESS NOTE ADULT - SUBJECTIVE AND OBJECTIVE BOX
Interval Events: Reviewed  Patient seen and examined at bedside.    Patient is a 49y old  Male who presents with a chief complaint of Unstable Angina (25 Apr 2019 17:48)  he is feeling better but has pain at the incision site    PAST MEDICAL & SURGICAL HISTORY:  No pertinent past medical history  H/O cardiac catheterization      MEDICATIONS:  Pulmonary:  buDESOnide   0.5 milliGRAM(s) Respule 0.5 milliGRAM(s) Nebulizer two times a day  ipratropium    for Nebulization 500 MICROGram(s) Nebulizer every 6 hours    Antimicrobials:  cefTRIAXone   IVPB 1 Gram(s) IV Intermittent every 24 hours  cefTRIAXone   IVPB        Anticoagulants:  aspirin enteric coated 81 milliGRAM(s) Oral daily  heparin  Injectable 5000 Unit(s) SubCutaneous every 8 hours    Cardiac:  diltiazem    Tablet 60 milliGRAM(s) Oral every 6 hours  metoprolol tartrate 12.5 milliGRAM(s) Oral every 12 hours      Allergies    No Known Allergies    Intolerances        Vital Signs Last 24 Hrs  T(C): 36.2 (25 Apr 2019 16:51), Max: 36.9 (24 Apr 2019 20:47)  T(F): 97.2 (25 Apr 2019 16:51), Max: 98.4 (24 Apr 2019 20:47)  HR: 95 (25 Apr 2019 20:00) (90 - 106)  BP: 103/68 (25 Apr 2019 20:00) (86/62 - 116/71)  BP(mean): 77 (25 Apr 2019 20:00) (70 - 85)  RR: 27 (25 Apr 2019 20:00) (17 - 33)  SpO2: 96% (25 Apr 2019 20:00) (92% - 100%)    04-24 @ 07:01 - 04-25 @ 07:00  --------------------------------------------------------  IN: 800 mL / OUT: 750 mL / NET: 50 mL    04-25 @ 07:01  -  04-25 @ 20:42  --------------------------------------------------------  IN: 860 mL / OUT: 400 mL / NET: 460 mL          LABS:      CBC Full  -  ( 25 Apr 2019 04:08 )  WBC Count : 14.28 K/uL  RBC Count : 3.91 M/uL  Hemoglobin : 11.1 g/dL  Hematocrit : 33.8 %  Platelet Count - Automated : 426 K/uL  Mean Cell Volume : 86.4 fl  Mean Cell Hemoglobin : 28.4 pg  Mean Cell Hemoglobin Concentration : 32.8 gm/dL  Auto Neutrophil # : x  Auto Lymphocyte # : x  Auto Monocyte # : x  Auto Eosinophil # : x  Auto Basophil # : x  Auto Neutrophil % : x  Auto Lymphocyte % : x  Auto Monocyte % : x  Auto Eosinophil % : x  Auto Basophil % : x    04-25    137  |  102  |  15  ----------------------------<  110<H>  4.4   |  24  |  0.76    Ca    9.2      25 Apr 2019 04:08  Phos  3.3     04-25  Mg     2.3     04-25    TPro  7.5  /  Alb  3.7  /  TBili  0.9  /  DBili  x   /  AST  31  /  ALT  39  /  AlkPhos  168<H>  04-25    PT/INR - ( 25 Apr 2019 04:08 )   PT: 14.0 sec;   INR: 1.23          PTT - ( 25 Apr 2019 04:08 )  PTT:31.1 sec                    RADIOLOGY & ADDITIONAL STUDIES (The following images were personally reviewed):  Jose:                                     No  Urine output:                       adequate  DVT prophylaxis:                 Yes  Flattus:                                  Yes  Bowel movement:              No
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
## 2019-04-26 ENCOUNTER — TRANSCRIPTION ENCOUNTER (OUTPATIENT)
Age: 49
End: 2019-04-26

## 2019-04-26 LAB
-  MEROPENEM: SIGNIFICANT CHANGE UP
ALBUMIN SERPL ELPH-MCNC: 3.7 G/DL — SIGNIFICANT CHANGE UP (ref 3.3–5)
ALP SERPL-CCNC: 183 U/L — HIGH (ref 40–120)
ALT FLD-CCNC: 50 U/L — HIGH (ref 10–45)
ANION GAP SERPL CALC-SCNC: 13 MMOL/L — SIGNIFICANT CHANGE UP (ref 5–17)
APPEARANCE UR: CLEAR — SIGNIFICANT CHANGE UP
APTT BLD: 30.2 SEC — SIGNIFICANT CHANGE UP (ref 27.5–36.3)
AST SERPL-CCNC: 41 U/L — HIGH (ref 10–40)
BACTERIA # UR AUTO: PRESENT /HPF
BILIRUB SERPL-MCNC: 0.8 MG/DL — SIGNIFICANT CHANGE UP (ref 0.2–1.2)
BILIRUB UR-MCNC: NEGATIVE — SIGNIFICANT CHANGE UP
BUN SERPL-MCNC: 15 MG/DL — SIGNIFICANT CHANGE UP (ref 7–23)
CALCIUM SERPL-MCNC: 9.3 MG/DL — SIGNIFICANT CHANGE UP (ref 8.4–10.5)
CHLORIDE SERPL-SCNC: 100 MMOL/L — SIGNIFICANT CHANGE UP (ref 96–108)
CO2 SERPL-SCNC: 22 MMOL/L — SIGNIFICANT CHANGE UP (ref 22–31)
COLOR SPEC: YELLOW — SIGNIFICANT CHANGE UP
CREAT SERPL-MCNC: 0.76 MG/DL — SIGNIFICANT CHANGE UP (ref 0.5–1.3)
CULTURE RESULTS: SIGNIFICANT CHANGE UP
CULTURE RESULTS: SIGNIFICANT CHANGE UP
DIFF PNL FLD: ABNORMAL
EPI CELLS # UR: SIGNIFICANT CHANGE UP /HPF (ref 0–5)
GLUCOSE BLDC GLUCOMTR-MCNC: 100 MG/DL — HIGH (ref 70–99)
GLUCOSE BLDC GLUCOMTR-MCNC: 109 MG/DL — HIGH (ref 70–99)
GLUCOSE BLDC GLUCOMTR-MCNC: 122 MG/DL — HIGH (ref 70–99)
GLUCOSE BLDC GLUCOMTR-MCNC: 91 MG/DL — SIGNIFICANT CHANGE UP (ref 70–99)
GLUCOSE SERPL-MCNC: 143 MG/DL — HIGH (ref 70–99)
GLUCOSE UR QL: NEGATIVE — SIGNIFICANT CHANGE UP
HCT VFR BLD CALC: 35.5 % — LOW (ref 39–50)
HGB BLD-MCNC: 12 G/DL — LOW (ref 13–17)
INR BLD: 1.21 — HIGH (ref 0.88–1.16)
KETONES UR-MCNC: NEGATIVE — SIGNIFICANT CHANGE UP
LEUKOCYTE ESTERASE UR-ACNC: NEGATIVE — SIGNIFICANT CHANGE UP
MAGNESIUM SERPL-MCNC: 2.3 MG/DL — SIGNIFICANT CHANGE UP (ref 1.6–2.6)
MCHC RBC-ENTMCNC: 29 PG — SIGNIFICANT CHANGE UP (ref 27–34)
MCHC RBC-ENTMCNC: 33.8 GM/DL — SIGNIFICANT CHANGE UP (ref 32–36)
MCV RBC AUTO: 85.7 FL — SIGNIFICANT CHANGE UP (ref 80–100)
METHOD TYPE: SIGNIFICANT CHANGE UP
METHOD TYPE: SIGNIFICANT CHANGE UP
NITRITE UR-MCNC: NEGATIVE — SIGNIFICANT CHANGE UP
NRBC # BLD: 0 /100 WBCS — SIGNIFICANT CHANGE UP (ref 0–0)
ORGANISM # SPEC MICROSCOPIC CNT: SIGNIFICANT CHANGE UP
PH UR: 6 — SIGNIFICANT CHANGE UP (ref 5–8)
PHOSPHATE SERPL-MCNC: 4 MG/DL — SIGNIFICANT CHANGE UP (ref 2.5–4.5)
PLATELET # BLD AUTO: 534 K/UL — HIGH (ref 150–400)
POTASSIUM SERPL-MCNC: 4.2 MMOL/L — SIGNIFICANT CHANGE UP (ref 3.5–5.3)
POTASSIUM SERPL-SCNC: 4.2 MMOL/L — SIGNIFICANT CHANGE UP (ref 3.5–5.3)
PROT SERPL-MCNC: 7.9 G/DL — SIGNIFICANT CHANGE UP (ref 6–8.3)
PROT UR-MCNC: 30 MG/DL
PROTHROM AB SERPL-ACNC: 13.7 SEC — HIGH (ref 10–12.9)
RBC # BLD: 4.14 M/UL — LOW (ref 4.2–5.8)
RBC # FLD: 15.1 % — HIGH (ref 10.3–14.5)
RBC CASTS # UR COMP ASSIST: ABNORMAL /HPF
SODIUM SERPL-SCNC: 135 MMOL/L — SIGNIFICANT CHANGE UP (ref 135–145)
SP GR SPEC: 1.02 — SIGNIFICANT CHANGE UP (ref 1–1.03)
SPECIMEN SOURCE: SIGNIFICANT CHANGE UP
SPECIMEN SOURCE: SIGNIFICANT CHANGE UP
UROBILINOGEN FLD QL: 2 E.U./DL
WBC # BLD: 17.27 K/UL — HIGH (ref 3.8–10.5)
WBC # FLD AUTO: 17.27 K/UL — HIGH (ref 3.8–10.5)
WBC UR QL: < 5 /HPF — SIGNIFICANT CHANGE UP

## 2019-04-26 PROCEDURE — 71046 X-RAY EXAM CHEST 2 VIEWS: CPT | Mod: 26

## 2019-04-26 PROCEDURE — 99232 SBSQ HOSP IP/OBS MODERATE 35: CPT | Mod: GC

## 2019-04-26 PROCEDURE — 71045 X-RAY EXAM CHEST 1 VIEW: CPT | Mod: 26,59

## 2019-04-26 RX ORDER — VANCOMYCIN HCL 1 G
VIAL (EA) INTRAVENOUS
Qty: 0 | Refills: 0 | Status: DISCONTINUED | OUTPATIENT
Start: 2019-04-26 | End: 2019-04-27

## 2019-04-26 RX ORDER — PIPERACILLIN AND TAZOBACTAM 4; .5 G/20ML; G/20ML
3.38 INJECTION, POWDER, LYOPHILIZED, FOR SOLUTION INTRAVENOUS EVERY 6 HOURS
Qty: 0 | Refills: 0 | Status: DISCONTINUED | OUTPATIENT
Start: 2019-04-26 | End: 2019-04-27

## 2019-04-26 RX ORDER — SODIUM CHLORIDE 9 MG/ML
3 INJECTION INTRAMUSCULAR; INTRAVENOUS; SUBCUTANEOUS EVERY 8 HOURS
Qty: 0 | Refills: 0 | Status: DISCONTINUED | OUTPATIENT
Start: 2019-04-26 | End: 2019-04-27

## 2019-04-26 RX ORDER — VANCOMYCIN HCL 1 G
1000 VIAL (EA) INTRAVENOUS ONCE
Qty: 0 | Refills: 0 | Status: COMPLETED | OUTPATIENT
Start: 2019-04-26 | End: 2019-04-26

## 2019-04-26 RX ORDER — VANCOMYCIN HCL 1 G
1000 VIAL (EA) INTRAVENOUS EVERY 12 HOURS
Qty: 0 | Refills: 0 | Status: DISCONTINUED | OUTPATIENT
Start: 2019-04-26 | End: 2019-04-27

## 2019-04-26 RX ORDER — OXYCODONE AND ACETAMINOPHEN 5; 325 MG/1; MG/1
1 TABLET ORAL EVERY 6 HOURS
Qty: 0 | Refills: 0 | Status: DISCONTINUED | OUTPATIENT
Start: 2019-04-26 | End: 2019-04-27

## 2019-04-26 RX ORDER — CHLORHEXIDINE GLUCONATE 213 G/1000ML
1 SOLUTION TOPICAL
Qty: 0 | Refills: 0 | Status: DISCONTINUED | OUTPATIENT
Start: 2019-04-26 | End: 2019-04-27

## 2019-04-26 RX ADMIN — OXYCODONE AND ACETAMINOPHEN 1 TABLET(S): 5; 325 TABLET ORAL at 11:54

## 2019-04-26 RX ADMIN — PIPERACILLIN AND TAZOBACTAM 200 GRAM(S): 4; .5 INJECTION, POWDER, LYOPHILIZED, FOR SOLUTION INTRAVENOUS at 12:21

## 2019-04-26 RX ADMIN — Medication 500 MICROGRAM(S): at 17:46

## 2019-04-26 RX ADMIN — PIPERACILLIN AND TAZOBACTAM 200 GRAM(S): 4; .5 INJECTION, POWDER, LYOPHILIZED, FOR SOLUTION INTRAVENOUS at 08:03

## 2019-04-26 RX ADMIN — Medication 60 MILLIGRAM(S): at 23:39

## 2019-04-26 RX ADMIN — Medication 500 MICROGRAM(S): at 09:23

## 2019-04-26 RX ADMIN — Medication 60 MILLIGRAM(S): at 12:22

## 2019-04-26 RX ADMIN — Medication 1 PATCH: at 17:46

## 2019-04-26 RX ADMIN — Medication 0.5 MILLIGRAM(S): at 21:38

## 2019-04-26 RX ADMIN — Medication 0.5 MILLIGRAM(S): at 09:23

## 2019-04-26 RX ADMIN — PANTOPRAZOLE SODIUM 40 MILLIGRAM(S): 20 TABLET, DELAYED RELEASE ORAL at 07:15

## 2019-04-26 RX ADMIN — Medication 100 MILLIGRAM(S): at 07:12

## 2019-04-26 RX ADMIN — Medication 60 MILLIGRAM(S): at 07:13

## 2019-04-26 RX ADMIN — HEPARIN SODIUM 5000 UNIT(S): 5000 INJECTION INTRAVENOUS; SUBCUTANEOUS at 13:40

## 2019-04-26 RX ADMIN — Medication 500 MICROGRAM(S): at 21:40

## 2019-04-26 RX ADMIN — Medication 100 MILLIGRAM(S): at 18:57

## 2019-04-26 RX ADMIN — OXYCODONE AND ACETAMINOPHEN 1 TABLET(S): 5; 325 TABLET ORAL at 18:08

## 2019-04-26 RX ADMIN — Medication 650 MILLIGRAM(S): at 13:42

## 2019-04-26 RX ADMIN — Medication 1 PATCH: at 11:53

## 2019-04-26 RX ADMIN — Medication 250 MILLIGRAM(S): at 18:56

## 2019-04-26 RX ADMIN — Medication 1 PATCH: at 06:24

## 2019-04-26 RX ADMIN — Medication 500 MICROGRAM(S): at 07:12

## 2019-04-26 RX ADMIN — PIPERACILLIN AND TAZOBACTAM 200 GRAM(S): 4; .5 INJECTION, POWDER, LYOPHILIZED, FOR SOLUTION INTRAVENOUS at 23:39

## 2019-04-26 RX ADMIN — ATORVASTATIN CALCIUM 80 MILLIGRAM(S): 80 TABLET, FILM COATED ORAL at 21:38

## 2019-04-26 RX ADMIN — OXYCODONE AND ACETAMINOPHEN 1 TABLET(S): 5; 325 TABLET ORAL at 04:35

## 2019-04-26 RX ADMIN — Medication 1 PATCH: at 12:21

## 2019-04-26 RX ADMIN — POLYETHYLENE GLYCOL 3350 17 GRAM(S): 17 POWDER, FOR SOLUTION ORAL at 12:22

## 2019-04-26 RX ADMIN — OXYCODONE AND ACETAMINOPHEN 1 TABLET(S): 5; 325 TABLET ORAL at 03:58

## 2019-04-26 RX ADMIN — HEPARIN SODIUM 5000 UNIT(S): 5000 INJECTION INTRAVENOUS; SUBCUTANEOUS at 21:38

## 2019-04-26 RX ADMIN — Medication 81 MILLIGRAM(S): at 12:21

## 2019-04-26 RX ADMIN — PIPERACILLIN AND TAZOBACTAM 200 GRAM(S): 4; .5 INJECTION, POWDER, LYOPHILIZED, FOR SOLUTION INTRAVENOUS at 18:55

## 2019-04-26 RX ADMIN — Medication 12.5 MILLIGRAM(S): at 07:13

## 2019-04-26 RX ADMIN — SODIUM CHLORIDE 3 MILLILITER(S): 9 INJECTION INTRAMUSCULAR; INTRAVENOUS; SUBCUTANEOUS at 13:18

## 2019-04-26 RX ADMIN — Medication 250 MILLIGRAM(S): at 07:13

## 2019-04-26 RX ADMIN — SODIUM CHLORIDE 3 MILLILITER(S): 9 INJECTION INTRAMUSCULAR; INTRAVENOUS; SUBCUTANEOUS at 21:38

## 2019-04-26 RX ADMIN — OXYCODONE AND ACETAMINOPHEN 1 TABLET(S): 5; 325 TABLET ORAL at 17:45

## 2019-04-26 RX ADMIN — OXYCODONE AND ACETAMINOPHEN 1 TABLET(S): 5; 325 TABLET ORAL at 10:18

## 2019-04-26 RX ADMIN — HEPARIN SODIUM 5000 UNIT(S): 5000 INJECTION INTRAVENOUS; SUBCUTANEOUS at 07:13

## 2019-04-26 RX ADMIN — Medication 650 MILLIGRAM(S): at 14:49

## 2019-04-26 NOTE — PROGRESS NOTE ADULT - RS GEN HX ROS MEA POS PC
wheezing/cough/dyspnea/sputum production
wheezing/sputum production/dyspnea/cough
wheezing/cough/dyspnea/sputum production
sputum production/cough/dyspnea/wheezing

## 2019-04-26 NOTE — PROGRESS NOTE ADULT - PROBLEM SELECTOR PLAN 1
he is clinically improving.  Continue on th inhalers.  Continue antibiotics.  Hold on steroids.  He does need bronch today.  Increase activity continue inhalers.  CXR improved with resolution of the linear atelectasis
he is clinically improving.  Continue on th inhalers.  Continue antibiotics.  Hold on steroids.  He does need bronch today.  Increase activity continue inhalers.  CXR improved with resolution of the linear atelectasis
he is clinically improving.  Continue on th inhalers.  Continue antibiotics.  Hold on steroids.  He does need bronch today.  Increase activity continue inhalers.  CXR improved
he is clinically improving.  Continue on th inhalers.  Continue antibiotics.  Hold on steroids.  Discussed earlier with CTS and recommended repeat brnchoscopy and he did well after with less secretions observed.  OOB in chair and wean off oxygen

## 2019-04-26 NOTE — PROGRESS NOTE ADULT - SUBJECTIVE AND OBJECTIVE BOX
Interval Events: Reviewed  Patient seen and examined at bedside.    Patient is a 49y old  Male who presents with a chief complaint of Unstable Angina (2019 20:42)    he is doing better and walking but the wound hurts  PAST MEDICAL & SURGICAL HISTORY:  No pertinent past medical history  H/O cardiac catheterization      MEDICATIONS:  Pulmonary:  buDESOnide   0.5 milliGRAM(s) Respule 0.5 milliGRAM(s) Nebulizer two times a day  ipratropium    for Nebulization 500 MICROGram(s) Nebulizer every 6 hours    Antimicrobials:  piperacillin/tazobactam IVPB. 3.375 Gram(s) IV Intermittent every 6 hours  vancomycin  IVPB 1000 milliGRAM(s) IV Intermittent every 12 hours  vancomycin  IVPB        Anticoagulants:  aspirin enteric coated 81 milliGRAM(s) Oral daily  heparin  Injectable 5000 Unit(s) SubCutaneous every 8 hours    Cardiac:  diltiazem    Tablet 60 milliGRAM(s) Oral every 6 hours  metoprolol tartrate 12.5 milliGRAM(s) Oral every 12 hours      Allergies    No Known Allergies    Intolerances        Vital Signs Last 24 Hrs  T(C): 36.9 (2019 17:41), Max: 37.1 (2019 09:00)  T(F): 98.4 (2019 17:41), Max: 98.8 (2019 09:00)  HR: 104 (2019 18:00) (90 - 106)  BP: 97/72 (2019 18:00) (90/62 - 133/71)  BP(mean): 81 (2019 18:00) (69 - 90)  RR: 31 (2019 18:00) (10 - 32)  SpO2: 94% (2019 18:00) (89% - 98%)     @ 07: @ 07:00  --------------------------------------------------------  IN: 890 mL / OUT: 650 mL / NET: 240 mL     @ 07: @ 20:59  --------------------------------------------------------  IN: 927 mL / OUT: 450 mL / NET: 477 mL          LABS:      CBC Full  -  ( 2019 03:28 )  WBC Count : 17.27 K/uL  RBC Count : 4.14 M/uL  Hemoglobin : 12.0 g/dL  Hematocrit : 35.5 %  Platelet Count - Automated : 534 K/uL  Mean Cell Volume : 85.7 fl  Mean Cell Hemoglobin : 29.0 pg  Mean Cell Hemoglobin Concentration : 33.8 gm/dL  Auto Neutrophil # : x  Auto Lymphocyte # : x  Auto Monocyte # : x  Auto Eosinophil # : x  Auto Basophil # : x  Auto Neutrophil % : x  Auto Lymphocyte % : x  Auto Monocyte % : x  Auto Eosinophil % : x  Auto Basophil % : x        135  |  100  |  15  ----------------------------<  143<H>  4.2   |  22  |  0.76    Ca    9.3      2019 03:28  Phos  4.0       Mg     2.3         TPro  7.9  /  Alb  3.7  /  TBili  0.8  /  DBili  x   /  AST  41<H>  /  ALT  50<H>  /  AlkPhos  183<H>      PT/INR - ( 2019 03:28 )   PT: 13.7 sec;   INR: 1.21          PTT - ( 2019 03:28 )  PTT:30.2 sec      Urinalysis Basic - ( 2019 07:04 )    Color: Yellow / Appearance: Clear / S.020 / pH: x  Gluc: x / Ketone: NEGATIVE  / Bili: Negative / Urobili: 2.0 E.U./dL   Blood: x / Protein: 30 mg/dL / Nitrite: NEGATIVE   Leuk Esterase: NEGATIVE / RBC: Many /HPF / WBC < 5 /HPF   Sq Epi: x / Non Sq Epi: 0-5 /HPF / Bacteria: Present /HPF                  RADIOLOGY & ADDITIONAL STUDIES (The following images were personally reviewed):  Jose:                                     No  Urine output:                       adequate  DVT prophylaxis:                 Yes  Flattus:                                  Yes  Bowel movement:              No

## 2019-04-26 NOTE — PROGRESS NOTE ADULT - PROBLEM SELECTOR PLAN 2
on cephalosporin
kyrie azithromycin.  Continue zosyn ? vanco added
on cephalosporin.I doubt then repeat cultures are you. The patient clinically stable with improvement in his respiratory status. The baseline oxygen saturation is normal. The patient is improving on cephalosporin. Discontinue vanco and zosyn nad can change to po antibiotic
kyrie azithromycin.  Continue zosyn

## 2019-04-26 NOTE — DISCHARGE NOTE NURSING/CASE MANAGEMENT/SOCIAL WORK - NSDCDPATPORTLINK_GEN_ALL_CORE
You can access the [facility], offered by [facility], by registering with the following website: [email or web address]

## 2019-04-27 ENCOUNTER — TRANSCRIPTION ENCOUNTER (OUTPATIENT)
Age: 49
End: 2019-04-27

## 2019-04-27 VITALS — RESPIRATION RATE: 28 BRPM | HEART RATE: 96 BPM | SYSTOLIC BLOOD PRESSURE: 108 MMHG | DIASTOLIC BLOOD PRESSURE: 70 MMHG

## 2019-04-27 LAB
ALBUMIN SERPL ELPH-MCNC: 3.8 G/DL — SIGNIFICANT CHANGE UP (ref 3.3–5)
ALP SERPL-CCNC: 169 U/L — HIGH (ref 40–120)
ALT FLD-CCNC: 51 U/L — HIGH (ref 10–45)
ANION GAP SERPL CALC-SCNC: 13 MMOL/L — SIGNIFICANT CHANGE UP (ref 5–17)
AST SERPL-CCNC: 40 U/L — SIGNIFICANT CHANGE UP (ref 10–40)
BASOPHILS # BLD AUTO: 0 K/UL — SIGNIFICANT CHANGE UP (ref 0–0.2)
BASOPHILS NFR BLD AUTO: 0 % — SIGNIFICANT CHANGE UP (ref 0–2)
BILIRUB SERPL-MCNC: 0.8 MG/DL — SIGNIFICANT CHANGE UP (ref 0.2–1.2)
BUN SERPL-MCNC: 15 MG/DL — SIGNIFICANT CHANGE UP (ref 7–23)
CALCIUM SERPL-MCNC: 9.1 MG/DL — SIGNIFICANT CHANGE UP (ref 8.4–10.5)
CHLORIDE SERPL-SCNC: 103 MMOL/L — SIGNIFICANT CHANGE UP (ref 96–108)
CO2 SERPL-SCNC: 21 MMOL/L — LOW (ref 22–31)
CREAT SERPL-MCNC: 0.76 MG/DL — SIGNIFICANT CHANGE UP (ref 0.5–1.3)
CULTURE RESULTS: NO GROWTH — SIGNIFICANT CHANGE UP
EOSINOPHIL # BLD AUTO: 0.65 K/UL — HIGH (ref 0–0.5)
EOSINOPHIL NFR BLD AUTO: 3.5 % — SIGNIFICANT CHANGE UP (ref 0–6)
GLUCOSE BLDC GLUCOMTR-MCNC: 98 MG/DL — SIGNIFICANT CHANGE UP (ref 70–99)
GLUCOSE SERPL-MCNC: 108 MG/DL — HIGH (ref 70–99)
HCT VFR BLD CALC: 34.6 % — LOW (ref 39–50)
HGB BLD-MCNC: 11.1 G/DL — LOW (ref 13–17)
LYMPHOCYTES # BLD AUTO: 16.7 % — SIGNIFICANT CHANGE UP (ref 13–44)
LYMPHOCYTES # BLD AUTO: 3.08 K/UL — SIGNIFICANT CHANGE UP (ref 1–3.3)
MAGNESIUM SERPL-MCNC: 2.3 MG/DL — SIGNIFICANT CHANGE UP (ref 1.6–2.6)
MCHC RBC-ENTMCNC: 27.5 PG — SIGNIFICANT CHANGE UP (ref 27–34)
MCHC RBC-ENTMCNC: 32.1 GM/DL — SIGNIFICANT CHANGE UP (ref 32–36)
MCV RBC AUTO: 85.6 FL — SIGNIFICANT CHANGE UP (ref 80–100)
MONOCYTES # BLD AUTO: 1.46 K/UL — HIGH (ref 0–0.9)
MONOCYTES NFR BLD AUTO: 7.9 % — SIGNIFICANT CHANGE UP (ref 2–14)
NEUTROPHILS # BLD AUTO: 13.09 K/UL — HIGH (ref 1.8–7.4)
NEUTROPHILS NFR BLD AUTO: 71 % — SIGNIFICANT CHANGE UP (ref 43–77)
PHOSPHATE SERPL-MCNC: 3.9 MG/DL — SIGNIFICANT CHANGE UP (ref 2.5–4.5)
PLATELET # BLD AUTO: 553 K/UL — HIGH (ref 150–400)
POTASSIUM SERPL-MCNC: 4.6 MMOL/L — SIGNIFICANT CHANGE UP (ref 3.5–5.3)
POTASSIUM SERPL-SCNC: 4.6 MMOL/L — SIGNIFICANT CHANGE UP (ref 3.5–5.3)
PROT SERPL-MCNC: 7.8 G/DL — SIGNIFICANT CHANGE UP (ref 6–8.3)
RBC # BLD: 4.04 M/UL — LOW (ref 4.2–5.8)
RBC # FLD: 15.3 % — HIGH (ref 10.3–14.5)
SODIUM SERPL-SCNC: 137 MMOL/L — SIGNIFICANT CHANGE UP (ref 135–145)
SPECIMEN SOURCE: SIGNIFICANT CHANGE UP
WBC # BLD: 18.43 K/UL — HIGH (ref 3.8–10.5)
WBC # FLD AUTO: 18.43 K/UL — HIGH (ref 3.8–10.5)

## 2019-04-27 PROCEDURE — 71045 X-RAY EXAM CHEST 1 VIEW: CPT | Mod: 26,59

## 2019-04-27 PROCEDURE — 71046 X-RAY EXAM CHEST 2 VIEWS: CPT | Mod: 26

## 2019-04-27 RX ORDER — DOCUSATE SODIUM 100 MG
1 CAPSULE ORAL
Qty: 90 | Refills: 0
Start: 2019-04-27 | End: 2019-05-26

## 2019-04-27 RX ORDER — FUROSEMIDE 40 MG
1 TABLET ORAL
Qty: 3 | Refills: 0
Start: 2019-04-27 | End: 2019-04-29

## 2019-04-27 RX ORDER — ASPIRIN/CALCIUM CARB/MAGNESIUM 324 MG
1 TABLET ORAL
Qty: 30 | Refills: 0
Start: 2019-04-27 | End: 2019-05-26

## 2019-04-27 RX ORDER — ATORVASTATIN CALCIUM 80 MG/1
1 TABLET, FILM COATED ORAL
Qty: 30 | Refills: 0
Start: 2019-04-27 | End: 2019-05-26

## 2019-04-27 RX ORDER — PANTOPRAZOLE SODIUM 20 MG/1
1 TABLET, DELAYED RELEASE ORAL
Qty: 30 | Refills: 0
Start: 2019-04-27 | End: 2019-05-26

## 2019-04-27 RX ORDER — ACETAMINOPHEN 500 MG
2 TABLET ORAL
Qty: 240 | Refills: 0
Start: 2019-04-27 | End: 2019-05-26

## 2019-04-27 RX ORDER — NICOTINE POLACRILEX 2 MG
1 GUM BUCCAL
Qty: 10 | Refills: 0
Start: 2019-04-27 | End: 2019-05-06

## 2019-04-27 RX ORDER — METOPROLOL TARTRATE 50 MG
0.5 TABLET ORAL
Qty: 30 | Refills: 0
Start: 2019-04-27 | End: 2019-05-26

## 2019-04-27 RX ORDER — POTASSIUM CHLORIDE 20 MEQ
1 PACKET (EA) ORAL
Qty: 3 | Refills: 0
Start: 2019-04-27 | End: 2019-04-29

## 2019-04-27 RX ORDER — POLYETHYLENE GLYCOL 3350 17 G/17G
17 POWDER, FOR SOLUTION ORAL
Qty: 250 | Refills: 0
Start: 2019-04-27

## 2019-04-27 RX ORDER — BUDESONIDE, MICRONIZED 100 %
1 POWDER (GRAM) MISCELLANEOUS
Qty: 30 | Refills: 0
Start: 2019-04-27 | End: 2019-05-26

## 2019-04-27 RX ORDER — IPRATROPIUM/ALBUTEROL SULFATE 18-103MCG
1 AEROSOL WITH ADAPTER (GRAM) INHALATION
Qty: 1 | Refills: 0
Start: 2019-04-27 | End: 2019-05-26

## 2019-04-27 RX ORDER — DILTIAZEM HCL 120 MG
1 CAPSULE, EXT RELEASE 24 HR ORAL
Qty: 30 | Refills: 0
Start: 2019-04-27 | End: 2019-05-26

## 2019-04-27 RX ADMIN — OXYCODONE AND ACETAMINOPHEN 1 TABLET(S): 5; 325 TABLET ORAL at 01:15

## 2019-04-27 RX ADMIN — SODIUM CHLORIDE 3 MILLILITER(S): 9 INJECTION INTRAMUSCULAR; INTRAVENOUS; SUBCUTANEOUS at 07:23

## 2019-04-27 RX ADMIN — Medication 1 PATCH: at 07:32

## 2019-04-27 RX ADMIN — Medication 500 MICROGRAM(S): at 06:25

## 2019-04-27 RX ADMIN — Medication 81 MILLIGRAM(S): at 10:45

## 2019-04-27 RX ADMIN — Medication 12.5 MILLIGRAM(S): at 07:22

## 2019-04-27 RX ADMIN — OXYCODONE AND ACETAMINOPHEN 1 TABLET(S): 5; 325 TABLET ORAL at 00:30

## 2019-04-27 RX ADMIN — Medication 250 MILLIGRAM(S): at 07:22

## 2019-04-27 RX ADMIN — Medication 60 MILLIGRAM(S): at 10:46

## 2019-04-27 RX ADMIN — HEPARIN SODIUM 5000 UNIT(S): 5000 INJECTION INTRAVENOUS; SUBCUTANEOUS at 07:23

## 2019-04-27 RX ADMIN — CHLORHEXIDINE GLUCONATE 1 APPLICATION(S): 213 SOLUTION TOPICAL at 07:23

## 2019-04-27 RX ADMIN — OXYCODONE AND ACETAMINOPHEN 1 TABLET(S): 5; 325 TABLET ORAL at 08:51

## 2019-04-27 RX ADMIN — Medication 60 MILLIGRAM(S): at 07:23

## 2019-04-27 RX ADMIN — Medication 100 MILLIGRAM(S): at 07:22

## 2019-04-27 RX ADMIN — PIPERACILLIN AND TAZOBACTAM 200 GRAM(S): 4; .5 INJECTION, POWDER, LYOPHILIZED, FOR SOLUTION INTRAVENOUS at 07:22

## 2019-04-27 RX ADMIN — Medication 1 PATCH: at 10:46

## 2019-04-27 RX ADMIN — PANTOPRAZOLE SODIUM 40 MILLIGRAM(S): 20 TABLET, DELAYED RELEASE ORAL at 07:22

## 2019-04-27 RX ADMIN — Medication 500 MICROGRAM(S): at 10:15

## 2019-04-27 RX ADMIN — Medication 0.5 MILLIGRAM(S): at 10:10

## 2019-04-27 NOTE — DISCHARGE NOTE PROVIDER - PROVIDER TOKENS
PROVIDER:[TOKEN:[[id number]]],PROVIDER:[TOKEN:[[id number]]],PROVIDER:[TOKEN:[[id number]]]

## 2019-04-27 NOTE — DISCHARGE NOTE PROVIDER - NSDCACTIVITY_GEN_ALL_CORE
Walking - Outdoors allowed/Showering allowed/Do not make important decisions/Walking - Indoors allowed/Do not drive or operate machinery/No heavy lifting/straining/Stairs allowed

## 2019-04-27 NOTE — DISCHARGE NOTE PROVIDER - HOSPITAL COURSE
48 y/o Serbian Speaking M (visiting from [place]), current smoker (2ppd x 28 years) with no signif PMHx, on no home meds, w/ FMHX + for father who passed away from MI @ 49 y/o, who reported to Dr. Zhong w/ 3-4 months of exertional chest pain.  Stress Echo 4/15/19 was abnormal and pt was scheduled for ambulatory cath.  Prior to cath,pt presented to [facility] ED with unstable angina, r/i for MI and underwent cardiac catheterization revealing 3vCAD.  After completion of preoperative workup, pt was taken to the OR on 4/19/2019 and underwent uncomplicated CABG x 4.  Post op course was slightly prolonged due to poor inspiratory effort and mild hypoxemia.  Pt was provided intensive instruction on deep breathing, incentive spirometry use and need for ambulation as well as followed by Pulmonary service (Dr. Payton).  Bedside bronchoscopy was performed on 4/26/19 and revealed ESBL Klebsiella in BAL and patient was started on antibiotics.  He is currently doing well, tolerating PO, ambulating well, voiding spontaneously, and oxygenating well on room air.  Pt's condition was discussed with Dr. Garcia and Dr. Ling and he is cleared for discharge home today with home care services. 50 y/o Dive[place] Speaking M (visiting from [place]), current smoker (2ppd x 28 years) with no signif PMHx, on no home meds, w/ FMHX + for father who passed away from MI @ 49 y/o, who reported to Dr. Zhong w/ 3-4 months of exertional chest pain.  Stress Echo 4/15/19 was abnormal and pt was scheduled for ambulatory cath.  Prior to cath,pt presented to [facility] ED with unstable angina, r/i for MI and underwent cardiac catheterization revealing 3vCAD.  After completion of preoperative workup, pt was taken to the OR on 4/19/2019 and underwent uncomplicated CABG x 4.  Post op course was slightly prolonged due to poor inspiratory effort and mild hypoxemia.  Pt was provided intensive instruction on deep breathing, incentive spirometry use and need for ambulation as well as followed by Pulmonary service (Dr. Payton).  Bedside bronchoscopy was performed on 4/26/19 and revealed ESBL Klebsiella in BAL and patient was started on antibiotics.  He is currently doing well, tolerating PO, ambulating well, voiding spontaneously, and oxygenating well on room air.  Pt's condition was discussed with Dr. Garcia and Dr. Ling and he is cleared for discharge home today with home care services.  Lasix 20mg PO daily x 3 days added to discharge medicaition list 2/2 mild bilateral effusions noted on this AM CXR.

## 2019-04-27 NOTE — DISCHARGE NOTE PROVIDER - NSDCFUADDINST_GEN_ALL_CORE_FT
-Please call Dr. Ling's office on Monday morning 4/29/2019 to schedule your follow up appointment.  The office is located at , [facility], 4th floor. Call us with any questions #[phone].  They will also be able to help you schedule your appointment with Dr. Payton.    -Please call Dr. Zhong's office on Monday to schedule a follow up apointment.    -Walk daily as tolerated and use your incentive spirometer every hour.    -No driving or strenuous activity/exercise for 6 weeks, or until cleared by your surgeon.    -Gently clean your incisions with anti-bacterial soap and water, pat dry.  You may leave them open to air.  Please shower every day.    -Call your doctor if you have shortness of breath, chest pain not relieved by pain medication, dizziness, fever >101.5, or increased redness or drainage from incisions.

## 2019-04-27 NOTE — PROGRESS NOTE ADULT - PROVIDER SPECIALTY LIST ADULT
CT Surgery
CT Surgery
Critical Care
Pulmonology
Pulmonology
Critical Care
Pulmonology
Pulmonology

## 2019-04-27 NOTE — DISCHARGE NOTE PROVIDER - CARE PROVIDER_API CALL
Nikki Payton)  Critical Care Medicine; Pulmonary Disease  [address], [address] [place] [address]  Phone: [phone]  Fax: [phone]  Follow Up Time:     HUA Ling)  Thoracic and Cardiac Surgery  [address], [address] [place] [address]  Phone: [phone]  Fax: [phone]  Follow Up Time:     Butch Zhong)  Cardiovascular Disease; Internal Medicine  [address] [place] [address]  Phone: [phone]  Fax: [phone]  Follow Up Time:

## 2019-04-27 NOTE — PROGRESS NOTE ADULT - ASSESSMENT
48 y/o Telugu Speaking M (visiting from [place]), current smoker (2ppd x 28 years) with no significant PMHx, on no home meds, w/ significant FMHX of early onset CAD now POD# 8 s/p CABGx3 doing well with resolving tracheobronchitis post-op.    He is tolerating PO, ambulating well, voiding spontaneously, and oxygenating well on room air.  Pt's condition was discussed with Dr. Garcia and Dr. Ling and he is cleared for discharge home today with home care services and follow up appoitnmets to be made w Dr. Ling, Dr. Payton and Dr. Zhong

## 2019-04-27 NOTE — PROGRESS NOTE ADULT - SUBJECTIVE AND OBJECTIVE BOX
Patient discussed on morning rounds with Dr. Garcia, Dr. Loza and Dr. Ling    Operation / Date:  CABG x 4    Surgeon: Anuradha    Referring Physician: Farheen    SUBJECTIVE ASSESSMENT:  Per , pt had a great night and is without complaints.      Hospital Course:    50 y/o Tamazight Speaking M (visiting from [place]), current smoker (2ppd x 28 years) with no signif PMHx, on no home meds, w/ FMHX + for father who passed away from MI @ 47 y/o, who reported to Dr. Zhong w/ 3-4 months of exertional chest pain.  Stress Echo 4/15/19 was abnormal and pt was scheduled for ambulatory cath.  Prior to cath,pt presented to [facility] ED with unstable angina, r/i for MI and underwent cardiac catheterization revealing 3vCAD.  After completion of preoperative workup, pt was taken to the OR on 2019 and underwent uncomplicated CABG x 4.  Post op course was slightly prolonged due to poor inspiratory effort and mild hypoxemia.  Pt was provided intensive instruction on deep breathing, incentive spirometry use and need for ambulation as well as followed by Pulmonary service (Dr. Payton).  Bedside bronchoscopy was performed on 19 and revealed ESBL Klebsiella in BAL and patient was started on antibiotics.  He is currently doing well, tolerating PO, ambulating well, voiding spontaneously, and oxygenating well on room air.  Pt's condition was discussed with Dr. Garcia and Dr. Ling and he is cleared for discharge home today with home care services.      Vital Signs Last 24 Hrs  T(C): 36.6 (2019 09:47), Max: 36.9 (2019 17:41)  T(F): 97.8 (2019 09:47), Max: 98.4 (2019 17:41)  HR: 96 (2019 09:13) (87 - 106)  BP: 109/68 (2019 09:13) (96/62 - 115/70)  BP(mean): 74 (2019 09:13) (69 - 81)  RR: 55 (2019 09:13) (14 - 55)  SpO2: 94% (2019 06:23) (89% - 96%)  I&O's Detail    2019 07: 07:00  --------------------------------------------------------  IN:    IV PiggyBack: 450 mL    Oral Fluid: 477 mL  Total IN: 927 mL    OUT:    Voided: 1000 mL  Total OUT: 1000 mL    Total NET: -73 mL          EPICARDIAL WIRES REMOVED: Yes  TIE DOWNS REMOVED: No tie downs for this patient    PHYSICAL EXAM:    General: AxOx3.  NAD,.  Sitting OOB comfortably on RA    Neurological: nonfocaL    Cardiovascular: S1SD2, RRR    Respiratory: CTA b/l    Gastrointestinal: +BS, soft, NT, ND.  LAst BM this AM    Extremities: W/W/P    Vascular:Intact.    Incision Sites:  Left Radial harvest site is C/D/I w dermabond and mild brusiing.  Left Thigh with resolving bruising.  Left stab site and distal EVH site are C/D/I with dermabond.    LABS:                        11.1   18.43 )-----------( 553      ( 2019 03:53 )             34.6       PT/INR - ( 2019 03:28 )   PT: 13.7 sec;   INR: 1.21     PTT - ( 2019 03:28 )  PTT:30.2 sec        137  |  103  |  15  ----------------------------<  108<H>  4.6   |  21<L>  |  0.76    Ca    9.1      2019 03:53  Phos  3.9       Mg     2.3         TPro  7.8  /  Alb  3.8  /  TBili  0.8  /  DBili  x   /  AST  40  /  ALT  51<H>  /  AlkPhos  169<H>        Urinalysis Basic - ( 2019 07:04 )    Color: Yellow / Appearance: Clear / S.020 / pH: x  Gluc: x / Ketone: NEGATIVE  / Bili: Negative / Urobili: 2.0 E.U./dL   Blood: x / Protein: 30 mg/dL / Nitrite: NEGATIVE   Leuk Esterase: NEGATIVE / RBC: Many /HPF / WBC < 5 /HPF   Sq Epi: x / Non Sq Epi: 0-5 /HPF / Bacteria: Present /HPF        MEDICATIONS  (STANDING):  aspirin enteric coated 81 milliGRAM(s) Oral daily  atorvastatin 80 milliGRAM(s) Oral at bedtime  buDESOnide   0.5 milliGRAM(s) Respule 0.5 milliGRAM(s) Nebulizer two times a day  chlorhexidine 2% Cloths 1 Application(s) Topical <User Schedule>  diltiazem    Tablet 60 milliGRAM(s) Oral every 6 hours  docusate sodium 100 milliGRAM(s) Oral two times a day  heparin  Injectable 5000 Unit(s) SubCutaneous every 8 hours  insulin lispro (HumaLOG) corrective regimen sliding scale   SubCutaneous Before meals and at bedtime  ipratropium    for Nebulization 500 MICROGram(s) Nebulizer every 6 hours  metoprolol tartrate 12.5 milliGRAM(s) Oral every 12 hours  nicotine - 21 mG/24Hr(s) Patch 1 patch Transdermal daily  pantoprazole    Tablet 40 milliGRAM(s) Oral before breakfast  piperacillin/tazobactam IVPB. 3.375 Gram(s) IV Intermittent every 6 hours  polyethylene glycol 3350 17 Gram(s) Oral daily  sodium chloride 0.9% lock flush 3 milliLiter(s) IV Push every 8 hours  vancomycin  IVPB 1000 milliGRAM(s) IV Intermittent every 12 hours  vancomycin  IVPB          Discharge CXR:  < from: Xray Chest 2 Views PA/Lat (19 @ 09:08) >  Frontal and lateral examination of the chest demonstrates the heart to be   within normal limits in transverse diameter. Bilateral effusions. Status   post sternotomy. Mild dextroscoliosis thoracic spine.    < end of copied text

## 2019-04-27 NOTE — CHART NOTE - NSCHARTNOTEFT_GEN_A_CORE
1 ventricular epicardial pacing wire removed while patient in bed, without complications.  Pt tolerated well.  VS stable and without change.

## 2019-04-27 NOTE — PROGRESS NOTE ADULT - REASON FOR ADMISSION
Unstable Angina
CABG
CABG
Unstable Angina

## 2019-04-27 NOTE — DISCHARGE NOTE PROVIDER - CARE PROVIDERS DIRECT ADDRESSES
[email or web address]

## 2019-04-29 LAB
-  PIPERACILLIN/TAZOBACTAM: SIGNIFICANT CHANGE UP
-  PIPERACILLIN/TAZOBACTAM: SIGNIFICANT CHANGE UP
ORGANISM # SPEC MICROSCOPIC CNT: SIGNIFICANT CHANGE UP

## 2019-05-02 ENCOUNTER — MOBILE ON CALL (OUTPATIENT)
Age: 49
End: 2019-05-02

## 2019-05-03 PROBLEM — Z82.49 FAMILY HISTORY OF MYOCARDIAL INFARCTION: Status: ACTIVE | Noted: 2019-05-03

## 2019-05-05 ENCOUNTER — FORM ENCOUNTER (OUTPATIENT)
Age: 49
End: 2019-05-05

## 2019-05-06 ENCOUNTER — APPOINTMENT (OUTPATIENT)
Dept: CARDIOTHORACIC SURGERY | Facility: CLINIC | Age: 49
End: 2019-05-06
Payer: COMMERCIAL

## 2019-05-06 ENCOUNTER — OUTPATIENT (OUTPATIENT)
Dept: OUTPATIENT SERVICES | Facility: HOSPITAL | Age: 49
LOS: 1 days | End: 2019-05-06
Payer: COMMERCIAL

## 2019-05-06 VITALS — HEIGHT: 66.93 IN | WEIGHT: 154.32 LBS | BODY MASS INDEX: 24.22 KG/M2

## 2019-05-06 VITALS
RESPIRATION RATE: 16 BRPM | DIASTOLIC BLOOD PRESSURE: 75 MMHG | HEART RATE: 85 BPM | SYSTOLIC BLOOD PRESSURE: 114 MMHG | OXYGEN SATURATION: 97 % | TEMPERATURE: 98.2 F

## 2019-05-06 DIAGNOSIS — Z98.890 OTHER SPECIFIED POSTPROCEDURAL STATES: Chronic | ICD-10-CM

## 2019-05-06 DIAGNOSIS — Z87.891 PERSONAL HISTORY OF NICOTINE DEPENDENCE: ICD-10-CM

## 2019-05-06 DIAGNOSIS — Z82.49 FAMILY HISTORY OF ISCHEMIC HEART DISEASE AND OTHER DISEASES OF THE CIRCULATORY SYSTEM: ICD-10-CM

## 2019-05-06 PROCEDURE — 99024 POSTOP FOLLOW-UP VISIT: CPT

## 2019-05-06 PROCEDURE — 71046 X-RAY EXAM CHEST 2 VIEWS: CPT | Mod: 26

## 2019-05-06 PROCEDURE — 71046 X-RAY EXAM CHEST 2 VIEWS: CPT

## 2019-05-07 DIAGNOSIS — D62 ACUTE POSTHEMORRHAGIC ANEMIA: ICD-10-CM

## 2019-05-07 DIAGNOSIS — F17.200 NICOTINE DEPENDENCE, UNSPECIFIED, UNCOMPLICATED: ICD-10-CM
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
## 2019-05-07 DIAGNOSIS — J95.821 ACUTE POSTPROCEDURAL RESPIRATORY FAILURE: ICD-10-CM
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
## 2019-05-07 DIAGNOSIS — J20.8 ACUTE BRONCHITIS DUE TO OTHER SPECIFIED ORGANISMS: ICD-10-CM

## 2019-05-07 DIAGNOSIS — E78.5 HYPERLIPIDEMIA, UNSPECIFIED: ICD-10-CM

## 2019-05-07 DIAGNOSIS — I45.10 UNSPECIFIED RIGHT BUNDLE-BRANCH BLOCK: ICD-10-CM

## 2019-05-07 DIAGNOSIS — I25.110 ATHEROSCLEROTIC HEART DISEASE OF NATIVE CORONARY ARTERY WITH UNSTABLE ANGINA PECTORIS: ICD-10-CM

## 2019-05-07 DIAGNOSIS — Z18.10 RETAINED METAL FRAGMENTS, UNSPECIFIED: ICD-10-CM

## 2019-05-07 DIAGNOSIS — J43.2 CENTRILOBULAR EMPHYSEMA: ICD-10-CM

## 2019-05-07 DIAGNOSIS — B96.1 KLEBSIELLA PNEUMONIAE [K. PNEUMONIAE] AS THE CAUSE OF DISEASES CLASSIFIED ELSEWHERE: ICD-10-CM

## 2019-05-07 DIAGNOSIS — R09.02 HYPOXEMIA: ICD-10-CM

## 2019-05-07 DIAGNOSIS — E87.2 ACIDOSIS: ICD-10-CM

## 2019-05-07 DIAGNOSIS — J95.89 OTHER POSTPROCEDURAL COMPLICATIONS AND DISORDERS OF RESPIRATORY SYSTEM, NOT ELSEWHERE CLASSIFIED: ICD-10-CM

## 2019-05-07 DIAGNOSIS — J90 PLEURAL EFFUSION, NOT ELSEWHERE CLASSIFIED: ICD-10-CM

## 2019-05-07 DIAGNOSIS — I10 ESSENTIAL (PRIMARY) HYPERTENSION: ICD-10-CM

## 2019-05-07 DIAGNOSIS — I21.4 NON-ST ELEVATION (NSTEMI) MYOCARDIAL INFARCTION: ICD-10-CM

## 2019-05-21 ENCOUNTER — APPOINTMENT (OUTPATIENT)
Dept: HEART AND VASCULAR | Facility: CLINIC | Age: 49
End: 2019-05-21

## 2019-05-23 ENCOUNTER — APPOINTMENT (OUTPATIENT)
Dept: HEART AND VASCULAR | Facility: CLINIC | Age: 49
End: 2019-05-23

## 2019-05-24 ENCOUNTER — MESSAGE (OUTPATIENT)
Age: 49
End: 2019-05-24

## 2019-06-04 ENCOUNTER — FORM ENCOUNTER (OUTPATIENT)
Age: 49
End: 2019-06-04

## 2019-06-05 ENCOUNTER — APPOINTMENT (OUTPATIENT)
Dept: CARDIOTHORACIC SURGERY | Facility: CLINIC | Age: 49
End: 2019-06-05

## 2019-06-05 ENCOUNTER — OUTPATIENT (OUTPATIENT)
Dept: OUTPATIENT SERVICES | Facility: HOSPITAL | Age: 49
LOS: 1 days | End: 2019-06-05
Payer: COMMERCIAL

## 2019-06-05 DIAGNOSIS — Z98.890 OTHER SPECIFIED POSTPROCEDURAL STATES: Chronic | ICD-10-CM

## 2019-06-05 PROCEDURE — 71046 X-RAY EXAM CHEST 2 VIEWS: CPT

## 2019-06-05 PROCEDURE — 71046 X-RAY EXAM CHEST 2 VIEWS: CPT | Mod: 26

## 2019-06-06 ENCOUNTER — APPOINTMENT (OUTPATIENT)
Dept: CARDIOTHORACIC SURGERY | Facility: CLINIC | Age: 49
End: 2019-06-06

## 2019-07-01 RX ORDER — FUROSEMIDE 20 MG/1
20 TABLET ORAL DAILY
Qty: 30 | Refills: 0 | Status: DISCONTINUED | COMMUNITY
Start: 1900-01-01 | End: 2019-07-01

## 2019-07-01 RX ORDER — NICOTINE 21 MG/24HR
21 PATCH, TRANSDERMAL 24 HOURS TRANSDERMAL DAILY
Qty: 1 | Refills: 0 | Status: DISCONTINUED | COMMUNITY
End: 2019-07-01

## 2019-07-01 RX ORDER — POTASSIUM CHLORIDE 750 MG/1
10 TABLET, FILM COATED, EXTENDED RELEASE ORAL DAILY
Qty: 30 | Refills: 0 | Status: DISCONTINUED | COMMUNITY
Start: 1900-01-01 | End: 2019-07-01

## 2019-07-01 RX ORDER — ACETAMINOPHEN 325 MG/1
325 TABLET ORAL EVERY 6 HOURS
Qty: 60 | Refills: 0 | Status: DISCONTINUED | COMMUNITY
End: 2019-07-01

## 2019-07-01 RX ORDER — IPRATROPIUM BROMIDE AND ALBUTEROL SULFATE 2.5; .5 MG/3ML; MG/3ML
0.5-2.5 (3) SOLUTION RESPIRATORY (INHALATION) 4 TIMES DAILY
Refills: 1 | Status: DISCONTINUED | COMMUNITY
End: 2019-07-01

## 2019-07-01 RX ORDER — BUDESONIDE 0.5 MG/2ML
0.5 INHALANT ORAL DAILY
Refills: 0 | Status: DISCONTINUED | COMMUNITY
End: 2019-07-01

## 2019-07-01 RX ORDER — PANTOPRAZOLE 40 MG/1
40 TABLET, DELAYED RELEASE ORAL
Qty: 90 | Refills: 0 | Status: DISCONTINUED | COMMUNITY
End: 2019-07-01

## 2019-07-08 ENCOUNTER — APPOINTMENT (OUTPATIENT)
Dept: HEART AND VASCULAR | Facility: CLINIC | Age: 49
End: 2019-07-08

## 2019-08-16 ENCOUNTER — APPOINTMENT (OUTPATIENT)
Dept: CARDIOTHORACIC SURGERY | Facility: CLINIC | Age: 49
End: 2019-08-16
Payer: COMMERCIAL

## 2019-08-16 VITALS
HEART RATE: 83 BPM | BODY MASS INDEX: 27.8 KG/M2 | OXYGEN SATURATION: 97 % | TEMPERATURE: 97.6 F | WEIGHT: 173 LBS | RESPIRATION RATE: 16 BRPM | HEIGHT: 66 IN | SYSTOLIC BLOOD PRESSURE: 121 MMHG | DIASTOLIC BLOOD PRESSURE: 79 MMHG

## 2019-08-16 DIAGNOSIS — Z09 ENCOUNTER FOR FOLLOW-UP EXAMINATION AFTER COMPLETED TREATMENT FOR CONDITIONS OTHER THAN MALIGNANT NEOPLASM: ICD-10-CM

## 2019-08-16 DIAGNOSIS — I50.22 CHRONIC SYSTOLIC (CONGESTIVE) HEART FAILURE: ICD-10-CM

## 2019-08-16 DIAGNOSIS — Z95.1 PRESENCE OF AORTOCORONARY BYPASS GRAFT: ICD-10-CM

## 2019-08-16 PROCEDURE — 99214 OFFICE O/P EST MOD 30 MIN: CPT

## 2019-08-16 RX ORDER — METOPROLOL TARTRATE 25 MG/1
25 TABLET, FILM COATED ORAL
Qty: 90 | Refills: 0 | Status: ACTIVE | COMMUNITY
Start: 1900-01-01 | End: 1900-01-01

## 2019-08-16 RX ORDER — ATORVASTATIN CALCIUM 80 MG/1
80 TABLET, FILM COATED ORAL DAILY
Qty: 90 | Refills: 0 | Status: ACTIVE | COMMUNITY
Start: 1900-01-01 | End: 1900-01-01

## 2019-08-16 RX ORDER — DILTIAZEM HYDROCHLORIDE 240 MG/1
240 CAPSULE, EXTENDED RELEASE ORAL
Qty: 90 | Refills: 2 | Status: ACTIVE | COMMUNITY
Start: 1900-01-01 | End: 1900-01-01

## 2019-08-16 RX ORDER — ASPIRIN ENTERIC COATED TABLETS 81 MG 81 MG/1
81 TABLET, DELAYED RELEASE ORAL DAILY
Qty: 90 | Refills: 0 | Status: ACTIVE | COMMUNITY
Start: 1900-01-01 | End: 1900-01-01

## 2019-08-16 NOTE — ASSESSMENT
[FreeTextEntry1] : 49 year old male status post CABG x 3 on 4/19/19 presents today reporting that he has continued smoking and not remained compliant with medications. Patient has not followed with a cardiologist since discharge from CT surgery. Patient offered refills as a one time courtesy, as he is traveling to [place]. I notified patient he must see a cardiologist for routine followup care. Patient given name and number for Dr. Butch Zhong's office. Patient understands he may also receive care at [facility] or [facility] if cardiology care fails. Of note, I have seen multiple "No shows" for cardiologists within [facility] including Dr. Vanegas, Dr. Davis, and Dr. Alford. \par \par The patient is discharged from our services and was instructed to follow up with his PCP and Cardiologist. I instructed the patient to call my office if he has any questions or concerns.\par

## 2019-08-16 NOTE — HISTORY OF PRESENT ILLNESS
[FreeTextEntry1] : 49 year old male, noncompliant, presents to the office today. He is s/p CABG x 3 on 4/19/19. Patient states he has not taken his medication and has continued smoking. When asked if he has followed with a cardiologist, he states no one is taking his insurance. \par \par Patient denies any fever, chills, fatigue, syncope, acute chest pain, palpitations, SOB, orthopnea, paroxysmal nocturnal dyspnea, vomiting, abdominal pain, back pain, BRBPR or swelling to legs.

## 2019-08-16 NOTE — PROCEDURE
[FreeTextEntry1] :  \par We reviewed the benefits of smoking cessation:\par \par 1. Your blood pressure will drop back down to normal within 20 minutes. \par \par 2. Within eight hours the carbon monoxide levels in your bloodstream should decrease by about one half and oxygen level should return to normal. \par \par 3. In 48 hours, your chance of having a heart attack will have decreased. All the nicotine will have left your body and your sense of taste and smell should return to a normal level. \par \par 4. In 72 hours, your bronchial tubes will relax and your energy levels will increase. \par \par 5. After two weeks, your circulation should increase and it will continue to improve for the next 10 weeks. \par \par 6. From 3 - 9 months after quitting smoking, coughing, wheezing and breathing problems will dissipate as your lung capacity should improve by 10%. \par \par 7. In one year, your risk of having a heart attack will have dropped by one half. \par \par 8. After five years of smoking cessation, your risk of having a stroke returns to that of a non-smoker. \par \par 9. After 10 years, your risk of lung cancer will return to that of a non-smoker. \par \par 10. Finally, after 15 years, risk of heart attack will return to that of a non-smoker." - <https://excommunity.[employer]omeanex.org>; www.cdc.org <http://www.cdc.org>\par

## 2019-10-31 PROCEDURE — 86850 RBC ANTIBODY SCREEN: CPT

## 2019-10-31 PROCEDURE — 84484 ASSAY OF TROPONIN QUANT: CPT

## 2019-10-31 PROCEDURE — 93880 EXTRACRANIAL BILAT STUDY: CPT

## 2019-10-31 PROCEDURE — 97162 PT EVAL MOD COMPLEX 30 MIN: CPT

## 2019-10-31 PROCEDURE — 80061 LIPID PANEL: CPT

## 2019-10-31 PROCEDURE — 83605 ASSAY OF LACTIC ACID: CPT

## 2019-10-31 PROCEDURE — P9016: CPT

## 2019-10-31 PROCEDURE — 86901 BLOOD TYPING SEROLOGIC RH(D): CPT

## 2019-10-31 PROCEDURE — 93306 TTE W/DOPPLER COMPLETE: CPT

## 2019-10-31 PROCEDURE — 71045 X-RAY EXAM CHEST 1 VIEW: CPT

## 2019-10-31 PROCEDURE — 86923 COMPATIBILITY TEST ELECTRIC: CPT

## 2019-10-31 PROCEDURE — 36415 COLL VENOUS BLD VENIPUNCTURE: CPT

## 2019-10-31 PROCEDURE — P9035: CPT

## 2019-10-31 PROCEDURE — 81001 URINALYSIS AUTO W/SCOPE: CPT

## 2019-10-31 PROCEDURE — 83735 ASSAY OF MAGNESIUM: CPT

## 2019-10-31 PROCEDURE — 84100 ASSAY OF PHOSPHORUS: CPT

## 2019-10-31 PROCEDURE — 94640 AIRWAY INHALATION TREATMENT: CPT

## 2019-10-31 PROCEDURE — 83880 ASSAY OF NATRIURETIC PEPTIDE: CPT

## 2019-10-31 PROCEDURE — 94660 CPAP INITIATION&MGMT: CPT

## 2019-10-31 PROCEDURE — C1769: CPT

## 2019-10-31 PROCEDURE — 82962 GLUCOSE BLOOD TEST: CPT

## 2019-10-31 PROCEDURE — P9045: CPT

## 2019-10-31 PROCEDURE — 86900 BLOOD TYPING SEROLOGIC ABO: CPT

## 2019-10-31 PROCEDURE — 84443 ASSAY THYROID STIM HORMONE: CPT

## 2019-10-31 PROCEDURE — P9047: CPT

## 2019-10-31 PROCEDURE — 80202 ASSAY OF VANCOMYCIN: CPT

## 2019-10-31 PROCEDURE — 93005 ELECTROCARDIOGRAM TRACING: CPT

## 2019-10-31 PROCEDURE — 82803 BLOOD GASES ANY COMBINATION: CPT

## 2019-10-31 PROCEDURE — 97116 GAIT TRAINING THERAPY: CPT

## 2019-10-31 PROCEDURE — 82330 ASSAY OF CALCIUM: CPT

## 2019-10-31 PROCEDURE — C1887: CPT

## 2019-10-31 PROCEDURE — 94002 VENT MGMT INPAT INIT DAY: CPT

## 2019-10-31 PROCEDURE — 80053 COMPREHEN METABOLIC PANEL: CPT

## 2019-10-31 PROCEDURE — C1889: CPT

## 2019-10-31 PROCEDURE — 84295 ASSAY OF SERUM SODIUM: CPT

## 2019-10-31 PROCEDURE — 87086 URINE CULTURE/COLONY COUNT: CPT

## 2019-10-31 PROCEDURE — 87070 CULTURE OTHR SPECIMN AEROBIC: CPT

## 2019-10-31 PROCEDURE — 85730 THROMBOPLASTIN TIME PARTIAL: CPT

## 2019-10-31 PROCEDURE — 87184 SC STD DISK METHOD PER PLATE: CPT

## 2019-10-31 PROCEDURE — C1894: CPT

## 2019-10-31 PROCEDURE — 84132 ASSAY OF SERUM POTASSIUM: CPT

## 2019-10-31 PROCEDURE — 85027 COMPLETE CBC AUTOMATED: CPT

## 2019-10-31 PROCEDURE — 36430 TRANSFUSION BLD/BLD COMPNT: CPT

## 2019-10-31 PROCEDURE — 71250 CT THORAX DX C-: CPT

## 2019-10-31 PROCEDURE — 80048 BASIC METABOLIC PNL TOTAL CA: CPT

## 2019-10-31 PROCEDURE — 71046 X-RAY EXAM CHEST 2 VIEWS: CPT

## 2019-10-31 PROCEDURE — 85025 COMPLETE CBC W/AUTO DIFF WBC: CPT

## 2019-10-31 PROCEDURE — 99285 EMERGENCY DEPT VISIT HI MDM: CPT

## 2019-10-31 PROCEDURE — 87186 SC STD MICRODIL/AGAR DIL: CPT

## 2019-10-31 PROCEDURE — 85610 PROTHROMBIN TIME: CPT

## 2019-10-31 PROCEDURE — 94150 VITAL CAPACITY TEST: CPT

## 2019-10-31 PROCEDURE — 83036 HEMOGLOBIN GLYCOSYLATED A1C: CPT

## 2020-07-14 NOTE — BRIEF OPERATIVE NOTE - CONDITION POST OP
Please cancel patient's appointment for 7/17/20  Patient needs to complete the 's Evaluation with OT regarding his driving ability  Patient completed a PT evaluation, which was not the same evaluation as the 's exam that was requested  I have reodered the OT referral, but it appears that the previous referral is still active in the system as well  On evaluation in PT, patient was noted to be sleeping during portions of the exam, and required frequent redirection  At this time, will decline to complete any paperwork for [place] [employer] for patient to return to driving 
critical

## 2022-01-26 ENCOUNTER — TRANSCRIPTION ENCOUNTER (OUTPATIENT)
Age: 52
End: 2022-01-26

## 2022-01-26 ENCOUNTER — INPATIENT (INPATIENT)
Facility: HOSPITAL | Age: 52
LOS: 0 days | Discharge: HOME | DRG: 247 | End: 2022-01-27
Attending: INTERNAL MEDICINE | Admitting: INTERNAL MEDICINE
Payer: COMMERCIAL

## 2022-01-26 VITALS
WEIGHT: 154.32 LBS | RESPIRATION RATE: 18 BRPM | HEART RATE: 96 BPM | TEMPERATURE: 98 F | DIASTOLIC BLOOD PRESSURE: 85 MMHG | HEIGHT: 67 IN | SYSTOLIC BLOOD PRESSURE: 123 MMHG | OXYGEN SATURATION: 98 %

## 2022-01-26 DIAGNOSIS — E78.5 HYPERLIPIDEMIA, UNSPECIFIED: ICD-10-CM

## 2022-01-26 DIAGNOSIS — Z87.891 PERSONAL HISTORY OF NICOTINE DEPENDENCE: ICD-10-CM

## 2022-01-26 DIAGNOSIS — I10 ESSENTIAL (PRIMARY) HYPERTENSION: ICD-10-CM

## 2022-01-26 DIAGNOSIS — I20.0 UNSTABLE ANGINA: ICD-10-CM

## 2022-01-26 DIAGNOSIS — K62.5 HEMORRHAGE OF ANUS AND RECTUM: ICD-10-CM

## 2022-01-26 DIAGNOSIS — Z95.1 PRESENCE OF AORTOCORONARY BYPASS GRAFT: Chronic | ICD-10-CM

## 2022-01-26 DIAGNOSIS — R07.9 CHEST PAIN, UNSPECIFIED: ICD-10-CM

## 2022-01-26 DIAGNOSIS — Z98.890 OTHER SPECIFIED POSTPROCEDURAL STATES: Chronic | ICD-10-CM

## 2022-01-26 LAB
ALBUMIN SERPL ELPH-MCNC: 4.4 G/DL — SIGNIFICANT CHANGE UP (ref 3.3–5)
ALP SERPL-CCNC: 111 U/L — SIGNIFICANT CHANGE UP (ref 40–120)
ALT FLD-CCNC: 12 U/L — SIGNIFICANT CHANGE UP (ref 10–45)
ANION GAP SERPL CALC-SCNC: 10 MMOL/L — SIGNIFICANT CHANGE UP (ref 5–17)
APTT BLD: 39.4 SEC — HIGH (ref 27.5–35.5)
AST SERPL-CCNC: 13 U/L — SIGNIFICANT CHANGE UP (ref 10–40)
BASOPHILS # BLD AUTO: 0.06 K/UL — SIGNIFICANT CHANGE UP (ref 0–0.2)
BASOPHILS NFR BLD AUTO: 0.6 % — SIGNIFICANT CHANGE UP (ref 0–2)
BILIRUB SERPL-MCNC: 0.3 MG/DL — SIGNIFICANT CHANGE UP (ref 0.2–1.2)
BUN SERPL-MCNC: 14 MG/DL — SIGNIFICANT CHANGE UP (ref 7–23)
CALCIUM SERPL-MCNC: 9.3 MG/DL — SIGNIFICANT CHANGE UP (ref 8.4–10.5)
CHLORIDE SERPL-SCNC: 104 MMOL/L — SIGNIFICANT CHANGE UP (ref 96–108)
CO2 SERPL-SCNC: 26 MMOL/L — SIGNIFICANT CHANGE UP (ref 22–31)
CREAT SERPL-MCNC: 0.71 MG/DL — SIGNIFICANT CHANGE UP (ref 0.5–1.3)
EOSINOPHIL # BLD AUTO: 0.38 K/UL — SIGNIFICANT CHANGE UP (ref 0–0.5)
EOSINOPHIL NFR BLD AUTO: 3.5 % — SIGNIFICANT CHANGE UP (ref 0–6)
GLUCOSE SERPL-MCNC: 96 MG/DL — SIGNIFICANT CHANGE UP (ref 70–99)
HCT VFR BLD CALC: 45.7 % — SIGNIFICANT CHANGE UP (ref 39–50)
HGB BLD-MCNC: 14.7 G/DL — SIGNIFICANT CHANGE UP (ref 13–17)
IMM GRANULOCYTES NFR BLD AUTO: 0.3 % — SIGNIFICANT CHANGE UP (ref 0–1.5)
INR BLD: 1.12 — SIGNIFICANT CHANGE UP (ref 0.88–1.16)
LYMPHOCYTES # BLD AUTO: 39.4 % — SIGNIFICANT CHANGE UP (ref 13–44)
LYMPHOCYTES # BLD AUTO: 4.25 K/UL — HIGH (ref 1–3.3)
MCHC RBC-ENTMCNC: 26.8 PG — LOW (ref 27–34)
MCHC RBC-ENTMCNC: 32.2 GM/DL — SIGNIFICANT CHANGE UP (ref 32–36)
MCV RBC AUTO: 83.2 FL — SIGNIFICANT CHANGE UP (ref 80–100)
MONOCYTES # BLD AUTO: 1.2 K/UL — HIGH (ref 0–0.9)
MONOCYTES NFR BLD AUTO: 11.1 % — SIGNIFICANT CHANGE UP (ref 2–14)
NEUTROPHILS # BLD AUTO: 4.87 K/UL — SIGNIFICANT CHANGE UP (ref 1.8–7.4)
NEUTROPHILS NFR BLD AUTO: 45.1 % — SIGNIFICANT CHANGE UP (ref 43–77)
NRBC # BLD: 0 /100 WBCS — SIGNIFICANT CHANGE UP (ref 0–0)
NT-PROBNP SERPL-SCNC: 16 PG/ML — SIGNIFICANT CHANGE UP (ref 0–300)
PLATELET # BLD AUTO: 400 K/UL — SIGNIFICANT CHANGE UP (ref 150–400)
POTASSIUM SERPL-MCNC: 3.8 MMOL/L — SIGNIFICANT CHANGE UP (ref 3.5–5.3)
POTASSIUM SERPL-SCNC: 3.8 MMOL/L — SIGNIFICANT CHANGE UP (ref 3.5–5.3)
PROT SERPL-MCNC: 7.8 G/DL — SIGNIFICANT CHANGE UP (ref 6–8.3)
PROTHROM AB SERPL-ACNC: 13.4 SEC — SIGNIFICANT CHANGE UP (ref 10.6–13.6)
RBC # BLD: 5.49 M/UL — SIGNIFICANT CHANGE UP (ref 4.2–5.8)
RBC # FLD: 17.7 % — HIGH (ref 10.3–14.5)
SARS-COV-2 RNA SPEC QL NAA+PROBE: SIGNIFICANT CHANGE UP
SODIUM SERPL-SCNC: 140 MMOL/L — SIGNIFICANT CHANGE UP (ref 135–145)
TROPONIN T SERPL-MCNC: <0.01 NG/ML — SIGNIFICANT CHANGE UP (ref 0–0.01)
WBC # BLD: 10.79 K/UL — HIGH (ref 3.8–10.5)
WBC # FLD AUTO: 10.79 K/UL — HIGH (ref 3.8–10.5)

## 2022-01-26 PROCEDURE — 71045 X-RAY EXAM CHEST 1 VIEW: CPT | Mod: 26

## 2022-01-26 PROCEDURE — 99152 MOD SED SAME PHYS/QHP 5/>YRS: CPT

## 2022-01-26 PROCEDURE — 92928 PRQ TCAT PLMT NTRAC ST 1 LES: CPT | Mod: LC

## 2022-01-26 PROCEDURE — 93459 L HRT ART/GRFT ANGIO: CPT | Mod: 26,59

## 2022-01-26 PROCEDURE — 99285 EMERGENCY DEPT VISIT HI MDM: CPT

## 2022-01-26 PROCEDURE — 93010 ELECTROCARDIOGRAM REPORT: CPT

## 2022-01-26 RX ORDER — CLOPIDOGREL BISULFATE 75 MG/1
600 TABLET, FILM COATED ORAL ONCE
Refills: 0 | Status: COMPLETED | OUTPATIENT
Start: 2022-01-26 | End: 2022-01-26

## 2022-01-26 RX ORDER — CLOPIDOGREL BISULFATE 75 MG/1
75 TABLET, FILM COATED ORAL DAILY
Refills: 0 | Status: DISCONTINUED | OUTPATIENT
Start: 2022-01-27 | End: 2022-01-27

## 2022-01-26 RX ORDER — METOPROLOL TARTRATE 50 MG
25 TABLET ORAL
Refills: 0 | Status: DISCONTINUED | OUTPATIENT
Start: 2022-01-26 | End: 2022-01-27

## 2022-01-26 RX ORDER — SODIUM CHLORIDE 9 MG/ML
250 INJECTION INTRAMUSCULAR; INTRAVENOUS; SUBCUTANEOUS ONCE
Refills: 0 | Status: COMPLETED | OUTPATIENT
Start: 2022-01-26 | End: 2022-01-26

## 2022-01-26 RX ORDER — ASPIRIN/CALCIUM CARB/MAGNESIUM 324 MG
81 TABLET ORAL ONCE
Refills: 0 | Status: COMPLETED | OUTPATIENT
Start: 2022-01-26 | End: 2022-01-26

## 2022-01-26 RX ORDER — SODIUM CHLORIDE 9 MG/ML
500 INJECTION INTRAMUSCULAR; INTRAVENOUS; SUBCUTANEOUS
Refills: 0 | Status: DISCONTINUED | OUTPATIENT
Start: 2022-01-26 | End: 2022-01-27

## 2022-01-26 RX ORDER — INFLUENZA VIRUS VACCINE 15; 15; 15; 15 UG/.5ML; UG/.5ML; UG/.5ML; UG/.5ML
0.5 SUSPENSION INTRAMUSCULAR ONCE
Refills: 0 | Status: COMPLETED | OUTPATIENT
Start: 2022-01-26 | End: 2022-01-27

## 2022-01-26 RX ORDER — LIDOCAINE 4 G/100G
1 CREAM TOPICAL DAILY
Refills: 0 | Status: DISCONTINUED | OUTPATIENT
Start: 2022-01-26 | End: 2022-01-27

## 2022-01-26 RX ORDER — ATORVASTATIN CALCIUM 80 MG/1
80 TABLET, FILM COATED ORAL AT BEDTIME
Refills: 0 | Status: DISCONTINUED | OUTPATIENT
Start: 2022-01-26 | End: 2022-01-27

## 2022-01-26 RX ORDER — NICOTINE POLACRILEX 2 MG
1 GUM BUCCAL DAILY
Refills: 0 | Status: DISCONTINUED | OUTPATIENT
Start: 2022-01-26 | End: 2022-01-27

## 2022-01-26 RX ORDER — ACETAMINOPHEN 500 MG
650 TABLET ORAL EVERY 6 HOURS
Refills: 0 | Status: DISCONTINUED | OUTPATIENT
Start: 2022-01-26 | End: 2022-01-27

## 2022-01-26 RX ORDER — ACETAMINOPHEN 500 MG
650 TABLET ORAL ONCE
Refills: 0 | Status: DISCONTINUED | OUTPATIENT
Start: 2022-01-26 | End: 2022-01-26

## 2022-01-26 RX ORDER — FUROSEMIDE 40 MG
20 TABLET ORAL DAILY
Refills: 0 | Status: DISCONTINUED | OUTPATIENT
Start: 2022-01-27 | End: 2022-01-27

## 2022-01-26 RX ORDER — ASPIRIN/CALCIUM CARB/MAGNESIUM 324 MG
81 TABLET ORAL DAILY
Refills: 0 | Status: DISCONTINUED | OUTPATIENT
Start: 2022-01-27 | End: 2022-01-27

## 2022-01-26 RX ADMIN — LIDOCAINE 1 PATCH: 4 CREAM TOPICAL at 18:31

## 2022-01-26 RX ADMIN — SODIUM CHLORIDE 4.17 MILLILITER(S): 9 INJECTION INTRAMUSCULAR; INTRAVENOUS; SUBCUTANEOUS at 12:50

## 2022-01-26 RX ADMIN — LIDOCAINE 1 PATCH: 4 CREAM TOPICAL at 19:00

## 2022-01-26 RX ADMIN — CLOPIDOGREL BISULFATE 600 MILLIGRAM(S): 75 TABLET, FILM COATED ORAL at 12:50

## 2022-01-26 RX ADMIN — Medication 1 PATCH: at 16:36

## 2022-01-26 RX ADMIN — Medication 1 PATCH: at 19:00

## 2022-01-26 RX ADMIN — Medication 650 MILLIGRAM(S): at 17:37

## 2022-01-26 RX ADMIN — Medication 81 MILLIGRAM(S): at 12:49

## 2022-01-26 RX ADMIN — ATORVASTATIN CALCIUM 80 MILLIGRAM(S): 80 TABLET, FILM COATED ORAL at 21:30

## 2022-01-26 RX ADMIN — SODIUM CHLORIDE 210 MILLILITER(S): 9 INJECTION INTRAMUSCULAR; INTRAVENOUS; SUBCUTANEOUS at 16:37

## 2022-01-26 RX ADMIN — Medication 650 MILLIGRAM(S): at 16:37

## 2022-01-26 NOTE — H&P ADULT - PROBLEM SELECTOR PLAN 4
Lipid panel ordered for AM labs  - home medication: Atorvastatin 80 mg daily   - continue home medications

## 2022-01-26 NOTE — DISCHARGE NOTE PROVIDER - NSDCFUADDINST_GEN_ALL_CORE_FT
A Mediterranean Diet is recommended! Some suggestions include continue incorporating 2 or more servings per day of vegetables, fruits, and whole grains. Increase intake of fish and legumes/beans to 2 or more servings per week. Aim to increase intake of healthy fats, such as olive oil and avocados, and have a handful of nuts/seeds most days. Reduce red/processed meat consumption to 2 or fewer times per week.    The catheter from your groin was removed and bleeding was stopped with a closure device.  After 24hours you may take off the dressing and shower. Wash the site with soap and water.  There is no need to put on another bandage.  Avoid tub baths, hot tubs or swimming for 5 days.      Call the Interventional Cardiology Team at [phone] if any of following occur pertaining to your vascular access site:  Bleeding or hematoma formation (collection of blood under the skin), drainage or redness at the puncture site, numbness, decrease in strength, coolness or pale coloration of skin of the leg or hand.

## 2022-01-26 NOTE — ED PROVIDER NOTE - OBJECTIVE STATEMENT
Persian  [id number]  52M smoker PMH HTN, HLD, CAD w/ NSTEMI s/p CABG X 4 @ [facility] 2019 (LIMA - LAD, SVG - RCA, SVG - OM, RADIAL – DIAG) p/w SOB/CP/palpitations, also L shoulder pain, intermittent.  Pt had "NST 11/05/21 was abnormal; large sized reversible perfusion defect of moderate-severe intensity involving the entire (basal-apical) inferior/inferolateral myocardial walls suggestive of inducible myocardial ischemia in posterior coronary circulation, EF 59 %." Was scheduled for cath - possibly today, unclear when.   Denies associated nausea, vomiting, diarrhea, lightheaded, diaphoresis, cough, rhinorrhea, black stool, bloody stool, LE pain, LE swelling, focal weakness/numbness, abd pain, urinary complaints, f/c.

## 2022-01-26 NOTE — H&P ADULT - PROBLEM SELECTOR PLAN 2
Endorsed 15-20 day history of BRBPR w/ rectal burning pain and urge for anal itching   - rectal exam was no significant for any findings   - H/H stable at this time   - per discussion w/ IC attending, no concerns for bleeding at this time and to proceed w/ cardiac catheterization w/ possible intervention   - monitor for signs/symptoms of bleeding  - monitor daily CBC while inpatient

## 2022-01-26 NOTE — DISCHARGE NOTE PROVIDER - NSDCCPCAREPLAN_GEN_ALL_CORE_FT
PRINCIPAL DISCHARGE DIAGNOSIS  Diagnosis: Unstable angina  Assessment and Plan of Treatment: -You underwent a cardiac catheterization (date) and the blockage in your (artery) was opened with stent placement. NEVER MISS A DOSE OF ASPIRIN OR PLAVIX; IF YOU DO, YOU ARE AT RISK OF YOUR STENT CLOSING AND HAVING A HEART ATTACK. DO NOT STOP THESE TWO MEDICATIONS UNLESS INSTRUCTED TO DO SO BY YOUR CARDIOLOGIST. Your procedure was done through your groin or your wrist. You do not need to keep this area covered and you may shower. Please avoid any heavy lifting  (no more than 3 to 5 lbs) or strenuous activity for five days. If you develop any swelling, bleeding, hardening of the skin (hematoma formation), acute pain, numbness/tingling  in your arm or leg please contact your doctor immediately or call our 24/7 line: [phone] Please return to the hospital/seek immediate medical attention if worsening of symptoms- including not limited to chest pain, shortness of breath. Please follow up with Dr. Zhong in 1-2 weeks. Please call his office and make an appointment to see him. Please continue a heart healthy diet low in sodium, cholesterol, and fat.      SECONDARY DISCHARGE DIAGNOSES  Diagnosis: Hypertension  Assessment and Plan of Treatment: Hypertension, commonly called high blood pressure, is when the force of blood pumping through your arteries is too strong. Hypertension forces your heart to work harder to pump blood. Your arteries may become narrow or stiff. Having untreated or uncontrolled hypertension for a long period of time can cause heart attack, stroke, kidney disease, and other problems.   Please continue to taking Metoprolol tartrate 25mg BID    Maintain a healthy lifestyle and follow up with your primary care physician. For blood pressures at home that are too high or low please see your doctor or go to the Emergency Room as necessary.    Diagnosis: Hyperlipidemia  Assessment and Plan of Treatment: LDL is also known as "bad cholesterol" because it takes cholesterol to your arteries, where it may collect in artery walls. Too much cholesterol in your arteries may lead to a buildup of plaque known as atherosclerosis and can cause heart disease.  You can lower your LDL with medications and lifestyle modifications such as weight loss in overweight patients, aerobic exercise, and eating diets lower in saturated fats. Please continue atorvastatin 80mg daily     PRINCIPAL DISCHARGE DIAGNOSIS  Diagnosis: Unstable angina  Assessment and Plan of Treatment: -You underwent a cardiac catheterization 1/26/22 and the blockage in your left circumflex coronary artery was opened with stent placement. NEVER MISS A DOSE OF ASPIRIN OR PLAVIX; IF YOU DO, YOU ARE AT RISK OF YOUR STENT CLOSING AND HAVING A HEART ATTACK. DO NOT STOP THESE TWO MEDICATIONS UNLESS INSTRUCTED TO DO SO BY YOUR CARDIOLOGIST. Your procedure was done through your groin or your wrist. You do not need to keep this area covered and you may shower. Please avoid any heavy lifting  (no more than 3 to 5 lbs) or strenuous activity for five days. If you develop any swelling, bleeding, hardening of the skin (hematoma formation), acute pain, numbness/tingling  in your arm or leg please contact your doctor immediately or call our 24/7 line: [phone]. Please return to the hospital/seek immediate medical attention if worsening of symptoms- including not limited to chest pain, shortness of breath. Please follow up with Dr. Zhong in 1-2 weeks. Please call his office and make an appointment to see him. Please continue a heart healthy diet low in sodium, cholesterol, and fat.  We have provided you with a prescription for cardiac rehab which is medically supervised exercise program for your heart and has been shown to improve the quantity and quality of life of people with heart disease like yours. You should attend cardiac rehab 3 times per week for 12 weeks. We have provided you with a list of nearby facilities. Please call your insurance carrier to determine which of these facilities are covered under your plan. Please bring this prescription with you to your follow up appointment with your cardiologist who can then further assist you to enroll into a cardiac rehab program.      SECONDARY DISCHARGE DIAGNOSES  Diagnosis: Hypertension  Assessment and Plan of Treatment: Hypertension, commonly called high blood pressure, is when the force of blood pumping through your arteries is too strong. Hypertension forces your heart to work harder to pump blood. Your arteries may become narrow or stiff. Having untreated or uncontrolled hypertension for a long period of time can cause heart attack, stroke, kidney disease, and other problems.   Please continue to taking Metoprolol tartrate 25mg BID    Maintain a healthy lifestyle and follow up with your primary care physician. For blood pressures at home that are too high or low please see your doctor or go to the Emergency Room as necessary.    Diagnosis: Hyperlipidemia  Assessment and Plan of Treatment: LDL is also known as "bad cholesterol" because it takes cholesterol to your arteries, where it may collect in artery walls. Too much cholesterol in your arteries may lead to a buildup of plaque known as atherosclerosis and can cause heart disease.  You can lower your LDL with medications and lifestyle modifications such as weight loss in overweight patients, aerobic exercise, and eating diets lower in saturated fats. Please continue atorvastatin 80mg daily and start taking Zetia (Ezetimbe) 10mg daily.     PRINCIPAL DISCHARGE DIAGNOSIS  Diagnosis: Unstable angina  Assessment and Plan of Treatment: -You underwent a cardiac catheterization 1/26/22 and the blockage in your left circumflex coronary artery was opened with stent placement. NEVER MISS A DOSE OF ASPIRIN OR PLAVIX; IF YOU DO, YOU ARE AT RISK OF YOUR STENT CLOSING AND HAVING A HEART ATTACK. DO NOT STOP THESE TWO MEDICATIONS UNLESS INSTRUCTED TO DO SO BY YOUR CARDIOLOGIST. Your procedure was done through your groin or your wrist. You do not need to keep this area covered and you may shower. Please avoid any heavy lifting  (no more than 3 to 5 lbs) or strenuous activity for five days. If you develop any swelling, bleeding, hardening of the skin (hematoma formation), acute pain, numbness/tingling  in your arm or leg please contact your doctor immediately or call our 24/7 line: [phone]. Please return to the hospital/seek immediate medical attention if worsening of symptoms- including not limited to chest pain, shortness of breath. Please follow up with Dr. Zhong in 1-2 weeks. Please call his office and make an appointment to see him. Please continue a heart healthy diet low in sodium, cholesterol, and fat.  You had an ultrasound of your heart which showed that your heart pumping function is normal and your heart valves are normal.   We have provided you with a prescription for cardiac rehab which is medically supervised exercise program for your heart and has been shown to improve the quantity and quality of life of people with heart disease like yours. You should attend cardiac rehab 3 times per week for 12 weeks. We have provided you with a list of nearby facilities. Please call your insurance carrier to determine which of these facilities are covered under your plan. Please bring this prescription with you to your follow up appointment with your cardiologist who can then further assist you to enroll into a cardiac rehab program.      SECONDARY DISCHARGE DIAGNOSES  Diagnosis: Hypertension  Assessment and Plan of Treatment: Hypertension, commonly called high blood pressure, is when the force of blood pumping through your arteries is too strong. Hypertension forces your heart to work harder to pump blood. Your arteries may become narrow or stiff. Having untreated or uncontrolled hypertension for a long period of time can cause heart attack, stroke, kidney disease, and other problems.   Please continue to taking Metoprolol tartrate 25mg BID    Maintain a healthy lifestyle and follow up with your primary care physician. For blood pressures at home that are too high or low please see your doctor or go to the Emergency Room as necessary.    Diagnosis: Hyperlipidemia  Assessment and Plan of Treatment: LDL is also known as "bad cholesterol" because it takes cholesterol to your arteries, where it may collect in artery walls. Too much cholesterol in your arteries may lead to a buildup of plaque known as atherosclerosis and can cause heart disease.  You can lower your LDL with medications and lifestyle modifications such as weight loss in overweight patients, aerobic exercise, and eating diets lower in saturated fats. Please continue atorvastatin 80mg daily and start taking Zetia (Ezetimbe) 10mg daily.

## 2022-01-26 NOTE — ED PROVIDER NOTE - CLINICAL SUMMARY MEDICAL DECISION MAKING FREE TEXT BOX
52M smoker PMH HTN, HLD, CAD w/ NSTEMI s/p CABG X 4 @ [facility] 2019 (LIMA - LAD, SVG - RCA, SVG - OM, RADIAL – DIAG) p/w SOB/CP/palpitations, also L shoulder pain, intermittent.  Pt had "NST 11/05/21 was abnormal; large sized reversible perfusion defect of moderate-severe intensity involving the entire (basal-apical) inferior/inferolateral myocardial walls suggestive of inducible myocardial ischemia in posterior coronary circulation, EF 59 %." Was scheduled for cath - possibly today, unclear when.   Vitals wnl, exam as above. Very well appearing.   ddx: Possible ACS.   Labs, attempt to contact Dr. Zhong (left message), CXR, reassess.

## 2022-01-26 NOTE — DISCHARGE NOTE PROVIDER - HOSPITAL COURSE
51 y/o Wolof speaking male, current smoker, former marijuana smoker, questionably compliant patient, w/ FHX CAD (father, passed away at age 48) and PMHx HTN, HLD, and CAD s/p 4VCABG (at [facility] in 2019, LIMA-LAD, SVG-RCA, SVG-OM, Radial-Diag) who presented to [facility] ED c/o heavy breathing and SOB w/ minimal exertion and rest, stating he cannot even tolerate wearing a face mask at this time. Patient also endorsed left sided chest and back pain. Of note, patient also endorsed 15-20 days of rectal burning pain associated w/ intermittent diarrhea, the urge for anal itching, and BRBPR. Patient denied any dizziness, syncope, palpitations, abdominal pain, nausea/vomiting, LE edema, melena, fever, chills, cough. Patient had an outpatient NST on  11/05/2021 showing large, sized reversible perfusion defect of moderate-severe intensity involving the basal-apical inferior/inferolateral myocardial walls, suggestive of inducible myocardial ischemia in posterior coronary circulation, and EF 59%.     In ED, VS initially showed temp 97.9 F, HR 96 bpm, /85 mmHg, SPO2 98% on room air, and RR 18. EKG showed NSR at 96 bpm w/ T wave inversion in V1, no acute ischemic changes. Labs showed WBC 10.79, K 3.8, and troponin negative x 1, otherwise within normal limits. CXR appeared unremarkable, official read pending. COVID PCR negative. In ED, patient was given Tylenol 650 mg PO once. Patient is now admitted to cardiology/telemetry for unstable angina w/ plan for cardiac catheterization w/ Dr. Zhong.       Patient was referred for cardiac catheterization on 1/26/22 revealing w/ PTCA/JEN proximal LCX and other findings of LM normal, mid LAD 90%, D1 90%, proximal Ramus 80%, OM1 100% , proximal % , grafts patent,  procedure done via r groin PC. Pt. seen and examined at bedside this morning, without complaints and is out of bed ambulating. Right groin site stable without bleeding or hematoma, distal pulses intact. Vital signs stable, labs and telemetry reviewed. Home medication regime reviewed with Dr. Martinez and patient is to continue taking ….. Pt. stable for discharge as per Dr. Martinez and to f/u with Dr. Zhong in 1-2 weeks. Patient has been given appropriate discharge instructions including medication regimen, access site management and follow up. Prescriptions have been e-prescribed to patient's preferred pharmacy. 51 y/o Ukrainian speaking male, current smoker, former marijuana smoker, questionably compliant patient, w/ FHX CAD (father, passed away at age 48) and PMHx HTN, HLD, and CAD s/p 4VCABG (at [facility] in 2019, LIMA-LAD, SVG-RCA, SVG-OM, Radial-Diag) who presented to [facility] ED c/o heavy breathing and SOB w/ minimal exertion and rest, stating he cannot even tolerate wearing a face mask at this time. Patient also endorsed left sided chest and back pain. Of note, patient also endorsed 15-20 days of rectal burning pain associated w/ intermittent diarrhea, the urge for anal itching, and BRBPR. Patient denied any dizziness, syncope, palpitations, abdominal pain, nausea/vomiting, LE edema, melena, fever, chills, cough. Patient had an outpatient NST on 11/05/2021 showing large, sized reversible perfusion defect of moderate-severe intensity involving the basal-apical inferior/inferolateral myocardial walls, suggestive of inducible myocardial ischemia in posterior coronary circulation, and EF 59%.     In ED, VS initially showed temp 97.9 F, HR 96 bpm, /85 mmHg, SPO2 98% on room air, and RR 18. EKG showed NSR at 96 bpm w/ T wave inversion in V1, no acute ischemic changes. Labs showed WBC 10.79, K 3.8, and troponin negative x 1, otherwise within normal limits. CXR appeared unremarkable, official read pending. COVID PCR negative. In ED, patient was given Tylenol 650 mg PO once. Patient is now admitted to cardiology/telemetry for unstable angina w/ plan for cardiac catheterization w/ Dr. Zhong.     Patient underwent cardiac catheterization on 1/26/22 revealing w/ PTCA/JEN proximal LCX and other findings of LM normal, mid LAD 90%, D1 90%, proximal Ramus 80%, OM1 100% , proximal % , grafts patent, procedure done via r groin, hemostasis with PC.   ECHO 1/27/21: _____    Pt. seen and examined at bedside this morning, continues to complain of R sided CP and B/L shoulder pain, Dr. Zhong and IC Fellow made aware. Pt also continuing to c/o anal pain/itching, likely 2/2 hemorrhoids, given Rx for ointment and advised to f/u with PCP. He is out of bed ambulating. Right groin site stable without bleeding or hematoma, distal pulses intact. Vital signs stable, labs and telemetry reviewed. Hgb decreased from 14.7 on 1/26 to 12.5 on 1/27 likely dilutional from receiving IVF, no signs of bleeding. Home medication regime reviewed with Dr. Martinez and patient is to continue taking Aspirin 81mg QD, Plavix 75mg QD and Atorvastatin 80mg QD. Pt. stable for discharge as per Dr. Martinez and to f/u with Dr. Zhong in 1-2 weeks. Patient has been given appropriate discharge instructions including medication regimen, access site management and follow up. Prescriptions have been e-prescribed to patient's preferred pharmacy.         Cardiac Rehab Indications (Post PCI):            *Education on benefits of Cardiac Rehab provided to patient: Yes         *Referral and Prescription Given for Cardiac Rehab: Yes         *Pt given list of locations & instructed to contact their insurance company to review list of participating providers. Yes          *Pt instructed to bring Cardiac Rehab prescription with them to Cardiology Follow up appointment for assistance with enrollment: Yes  Statin Prescribed (Post PCI this admission):  Yes  DAPT Post PCI: Prescriptions for Aspirin/Plavix/Brilinta/Effient e-prescribed to patient's pharmacy: Yes         51 y/o Syriac speaking male, current smoker, former marijuana smoker, questionably compliant patient, w/ FHX CAD (father, passed away at age 48) and PMHx HTN, HLD, and CAD s/p 4VCABG (at [facility] in 2019, LIMA-LAD, SVG-RCA, SVG-OM, Radial-Diag) who presented to [facility] ED c/o heavy breathing and SOB w/ minimal exertion and rest, stating he cannot even tolerate wearing a face mask at this time. Patient also endorsed left sided chest and back pain. Of note, patient also endorsed 15-20 days of rectal burning pain associated w/ intermittent diarrhea, the urge for anal itching, and BRBPR. Patient denied any dizziness, syncope, palpitations, abdominal pain, nausea/vomiting, LE edema, melena, fever, chills, cough. Patient had an outpatient NST on 11/05/2021 showing large, sized reversible perfusion defect of moderate-severe intensity involving the basal-apical inferior/inferolateral myocardial walls, suggestive of inducible myocardial ischemia in posterior coronary circulation, and EF 59%.     In ED, VS initially showed temp 97.9 F, HR 96 bpm, /85 mmHg, SPO2 98% on room air, and RR 18. EKG showed NSR at 96 bpm w/ T wave inversion in V1, no acute ischemic changes. Labs showed WBC 10.79, K 3.8, and troponin negative x 1, otherwise within normal limits. CXR appeared unremarkable, official read pending. COVID PCR negative. In ED, patient was given Tylenol 650 mg PO once. Patient is now admitted to cardiology/telemetry for unstable angina w/ plan for cardiac catheterization w/ Dr. Zhong.     Patient underwent cardiac catheterization on 1/26/22 revealing w/ PTCA/JEN proximal LCX and other findings of LM normal, mid LAD 90%, D1 90%, proximal Ramus 80%, OM1 100% , proximal % , grafts patent, procedure done via r groin, hemostasis with PC.   ECHO 1/27/21: _____    Pt. seen and examined at bedside this morning, continues to complain of R sided CP and B/L shoulder pain, Dr. Zhong and IC Fellow made aware. Pt also continuing to c/o anal pain/itching, likely 2/2 hemorrhoids, given Rx for ointment and advised to f/u with PCP. He is out of bed ambulating. Right groin site stable without bleeding or hematoma, distal pulses intact. Vital signs stable, labs and telemetry reviewed. Hgb decreased from 14.7 on 1/26 to 12.5 on 1/27 likely dilutional from receiving IVF, no signs of bleeding. Home medication regime reviewed with Dr. Martinez and patient is to continue taking Aspirin 81mg QD, Plavix 75mg QD and Atorvastatin 80mg QD. Pt. stable for discharge as per Dr. Martinez and to f/u with Dr. Zhong in 1-2 weeks. Patient has been given appropriate discharge instructions including medication regimen, access site management and follow up. Prescriptions have been e-prescribed to patient's preferred pharmacy.         Cardiac Rehab Indications (Post PCI):            *Education on benefits of Cardiac Rehab provided to patient: Yes         *Referral and Prescription Given for Cardiac Rehab: Yes         *Pt given list of locations & instructed to contact their insurance company to review list of participating providers. Yes          *Pt instructed to bring Cardiac Rehab prescription with them to Cardiology Follow up appointment for assistance with enrollment: Yes  Statin Prescribed (Post PCI this admission):  Yes  DAPT Post PCI: Prescriptions for Aspirin/Plavix e-prescribed to patient's pharmacy: Yes         53 y/o Sinhala speaking male, current smoker, former marijuana smoker, questionably compliant patient, w/ FHX CAD (father, passed away at age 48) and PMHx HTN, HLD, and CAD s/p 4VCABG (at [facility] in 2019, LIMA-LAD, SVG-RCA, SVG-OM, Radial-Diag) who presented to [facility] ED c/o heavy breathing and SOB w/ minimal exertion and rest, stating he cannot even tolerate wearing a face mask at this time. Patient also endorsed left sided chest and back pain. Of note, patient also endorsed 15-20 days of rectal burning pain associated w/ intermittent diarrhea, the urge for anal itching, and BRBPR. Patient denied any dizziness, syncope, palpitations, abdominal pain, nausea/vomiting, LE edema, melena, fever, chills, cough. Patient had an outpatient NST on 11/05/2021 showing large, sized reversible perfusion defect of moderate-severe intensity involving the basal-apical inferior/inferolateral myocardial walls, suggestive of inducible myocardial ischemia in posterior coronary circulation, and EF 59%.     In ED, VS initially showed temp 97.9 F, HR 96 bpm, /85 mmHg, SPO2 98% on room air, and RR 18. EKG showed NSR at 96 bpm w/ T wave inversion in V1, no acute ischemic changes. Labs showed WBC 10.79, K 3.8, and troponin negative x 1, otherwise within normal limits. CXR appeared unremarkable, official read pending. COVID PCR negative. In ED, patient was given Tylenol 650 mg PO once. Patient is now admitted to cardiology/telemetry for unstable angina w/ plan for cardiac catheterization w/ Dr. Zhong.     Patient underwent cardiac catheterization on 1/26/22 revealing w/ PTCA/JEN proximal LCX and other findings of LM normal, mid LAD 90%, D1 90%, proximal Ramus 80%, OM1 100% , proximal % , grafts patent, procedure done via r groin, hemostasis with PC.   ECHO 1/27/21: _____    Pt. seen and examined at bedside this morning, continues to complain of R sided CP and B/L shoulder pain, Dr. Zhong and IC Fellow made aware. Pt also continuing to c/o anal pain/itching, likely 2/2 hemorrhoids, given Rx for ointment and advised to f/u with PCP. He is out of bed ambulating. Right groin site stable without bleeding or hematoma, distal pulses intact. Vital signs stable, labs and telemetry reviewed. Hgb decreased from 14.7 on 1/26 to 12.5 on 1/27 likely dilutional from receiving IVF, no signs of bleeding. Home medication regime reviewed with Dr. Martinez and patient is to continue taking Aspirin 81mg QD, Plavix 75mg QD, Atorvastatin 80mg QD, Zetia 10mg daily and Pantoprazole 40mg QD. Pt. stable for discharge as per Dr. Martinez and to f/u with Dr. Zhong in 1-2 weeks. Patient has been given appropriate discharge instructions including medication regimen, access site management and follow up. Prescriptions have been e-prescribed to patient's preferred pharmacy.         Cardiac Rehab Indications (Post PCI):            *Education on benefits of Cardiac Rehab provided to patient: Yes         *Referral and Prescription Given for Cardiac Rehab: Yes         *Pt given list of locations & instructed to contact their insurance company to review list of participating providers. Yes          *Pt instructed to bring Cardiac Rehab prescription with them to Cardiology Follow up appointment for assistance with enrollment: Yes  Statin Prescribed (Post PCI this admission):  Yes  DAPT Post PCI: Prescriptions for Aspirin/Plavix e-prescribed to patient's pharmacy: Yes         53 y/o Croatian speaking male, current smoker, former marijuana smoker, questionably compliant patient, w/ FHX CAD (father, passed away at age 48) and PMHx HTN, HLD, and CAD s/p 4VCABG (at [facility] in 2019, LIMA-LAD, SVG-RCA, SVG-OM, Radial-Diag) who presented to [facility] ED c/o heavy breathing and SOB w/ minimal exertion and rest, stating he cannot even tolerate wearing a face mask at this time. Patient also endorsed left sided chest and back pain. Of note, patient also endorsed 15-20 days of rectal burning pain associated w/ intermittent diarrhea, the urge for anal itching, and BRBPR. Patient denied any dizziness, syncope, palpitations, abdominal pain, nausea/vomiting, LE edema, melena, fever, chills, cough. Patient had an outpatient NST on 11/05/2021 showing large, sized reversible perfusion defect of moderate-severe intensity involving the basal-apical inferior/inferolateral myocardial walls, suggestive of inducible myocardial ischemia in posterior coronary circulation, and EF 59%.     In ED, VS initially showed temp 97.9 F, HR 96 bpm, /85 mmHg, SPO2 98% on room air, and RR 18. EKG showed NSR at 96 bpm w/ T wave inversion in V1, no acute ischemic changes. Labs showed WBC 10.79, K 3.8, and troponin negative x 1, otherwise within normal limits. CXR appeared unremarkable, official read pending. COVID PCR negative. In ED, patient was given Tylenol 650 mg PO once. Patient is now admitted to cardiology/telemetry for unstable angina w/ plan for cardiac catheterization w/ Dr. Zhong.     Patient underwent cardiac catheterization on 1/26/22 revealing w/ PTCA/JEN proximal LCX and other findings of LM normal, mid LAD 90%, D1 90%, proximal Ramus 80%, OM1 100% , proximal % , grafts patent, procedure done via r groin, hemostasis with PC.   ECHO 1/27/21: EF 65%, normal biventricular size and systolic function, no significant valvular disease, no pericardial effusion.    Pt. seen and examined at bedside this morning, continues to complain of R sided CP and B/L shoulder pain, Dr. Zhong and IC Fellow made aware. Pt also continuing to c/o anal pain/itching, likely 2/2 hemorrhoids, given Rx for ointment and advised to f/u with PCP. He is out of bed ambulating. Right groin site stable without bleeding or hematoma, distal pulses intact. Vital signs stable, labs and telemetry reviewed. Hgb decreased from 14.7 on 1/26 to 12.5 on 1/27 likely dilutional from receiving IVF, no signs of bleeding. Home medication regime reviewed with Dr. Martinez and patient is to continue taking Aspirin 81mg QD, Plavix 75mg QD, Atorvastatin 80mg QD, Zetia 10mg daily and Pantoprazole 40mg QD. Pt. stable for discharge as per Dr. Martinez and to f/u with Dr. Zhong in 1-2 weeks. Patient has been given appropriate discharge instructions including medication regimen, access site management and follow up. Prescriptions have been e-prescribed to patient's preferred pharmacy.         Cardiac Rehab Indications (Post PCI):            *Education on benefits of Cardiac Rehab provided to patient: Yes         *Referral and Prescription Given for Cardiac Rehab: Yes         *Pt given list of locations & instructed to contact their insurance company to review list of participating providers. Yes          *Pt instructed to bring Cardiac Rehab prescription with them to Cardiology Follow up appointment for assistance with enrollment: Yes  Statin Prescribed (Post PCI this admission):  Yes  DAPT Post PCI: Prescriptions for Aspirin/Plavix e-prescribed to patient's pharmacy: Yes

## 2022-01-26 NOTE — H&P ADULT - PROBLEM SELECTOR PLAN 1
Presented w/ complaints of heavy breathing/SOB w/ minimal exertion and rest as well as left side chest and back pain  - EKG showed NSR at 96 bpm w/ T wave inversion in V1, no acute ischemic changes   - troponin negative x 1   - CXR appeared unremarkable, awaiting official read   - outpatient NST (11/2021) showed large, sized reversible perfusion defect of moderate-severe intensity involving the basal-apical inferior/inferolateral myocardial walls, suggestive of inducible myocardial ischemia in posterior coronary circulation, and EF 59%.  - home medications: Aspirin 81 mg daily, Lopressor 25 mg BID, Atorvastatin 80 mg daily  - given Tylenol 650 mg PO once in ED   - as confirmed w/ IC attending, patient given Aspirin 81 mg PO once, Plavix 600 mg PO once, and 250 cc NS bolus   - patient now undergoing cardiac catheterization w/ possible intervention if clinically indicated  - risk and benefits of procedure explained   - consent obtained and placed in chart, w/ assistance of Kiswahili   - cardiac cath order placed

## 2022-01-26 NOTE — H&P ADULT - NSHPLABSRESULTS_GEN_ALL_CORE
14.7   10.79 )-----------( 400      ( 26 Jan 2022 10:04 )             45.7       01-26    140  |  104  |  14  ----------------------------<  96  3.8   |  26  |  0.71    Ca    9.3      26 Jan 2022 10:04    TPro  7.8  /  Alb  4.4  /  TBili  0.3  /  DBili  x   /  AST  13  /  ALT  12  /  AlkPhos  111  01-26

## 2022-01-26 NOTE — ED ADULT TRIAGE NOTE - CHIEF COMPLAINT QUOTE
51 y/o male c/o chest pain, back pain and SOB, brought from admitting for evaluation. as per admitting person patient was scheduled for cath lab today, however patient denies having an appointment today. Hx of open heart surgery 3 years ago. 53 y/o male c/o chest pain, back pain and SOB, brought from admitting for evaluation. as per admitting person patient was scheduled for cath lab today, however patient denies having an appointment today. Hx of open heart surgery 3 years ago. Slovak  [id number]

## 2022-01-26 NOTE — H&P ADULT - PROBLEM SELECTOR PLAN 5
States he previously smoked 3 PPD prior to his CABG, now smokes 1 PPD since his CABG   - as per [employer], patient is prescribed Nicotine 21 mg/24 hours patch  - continue patch as prescribed as outpatient while admitted   - smoking cessation         VTE PPX: holding for procedure   Dispo: pending cardiac cath

## 2022-01-26 NOTE — H&P ADULT - PROBLEM SELECTOR PLAN 3
's since arrival to hospital   - home medications: Lopressor 25 mg BID, Lasix 20 mg daily   - outpatient NST from 11/2021 w/ EF 59%  - continue home medications at this time

## 2022-01-26 NOTE — H&P ADULT - HISTORY OF PRESENT ILLNESS
53 y/o Estonian speaking male, current/former smoker, former marijuana smoker, questionably compliant patient, w/ FHX CAD (father, passed away at age 48) and PMHx HTN, HLD, and CAD s/p 4VCABG (at [facility] in 2019, LIMA-LAD, SVG-RCA, SVG-OM, Radial-Diag) who presented to [facility] ED c/o ________. _________. Of note, patient had an outpatient NST on  11/05/2021 showing large, sized reversible perfusion defect of moderate-severe intensity involving the basal-apical inferior/inferolateral myocardial walls, suggestive of inducible myocardial ischemia in posterior coronary circulation, and EF 59%.     In ED, VS initially showed temp 97.9 F, HR 96 bpm, /85 mmHg, SPO2 98% on room air, and RR 18. EKG showed NSR at 96 bpm w/ T wave inversion in V1, no acute ischemic changes. Labs showed WBC 10.79, K 3.8, and troponin negative x 1, otherwise within normal limits. CXR appeared unremarkable, official read pending. COVID PCR ______. In ED, patient was given Tylenol 650 mg PO once. Patient is now admitted to cardiology/telemetry for unstable angina w/ plan for cardiac catheterization w/ Dr. Zhong.  51 y/o Serbian speaking male, current/former smoker, former marijuana smoker, questionably compliant patient, w/ FHX CAD (father, passed away at age 48) and PMHx HTN, HLD, and CAD s/p 4VCABG (at [facility] in 2019, LIMA-LAD, SVG-RCA, SVG-OM, Radial-Diag) who presented to [facility] ED c/o heavy breathing and SOB w/ minimal exertion and rest, stating he cannot even tolerate wearing a face mask at this time. Patient also endorsed left sided chest and back pain. Of note, patient also endorsed 15-20 days of rectal burning pain associated w/ intermittent diarrhea, the urge for anal itching, and BRBPR. Patient denied any dizziness, syncope, palpitations, abdominal pain, nausea/vomiting, LE edema, melena, fever, chills, cough. Patient had an outpatient NST on  11/05/2021 showing large, sized reversible perfusion defect of moderate-severe intensity involving the basal-apical inferior/inferolateral myocardial walls, suggestive of inducible myocardial ischemia in posterior coronary circulation, and EF 59%.     In ED, VS initially showed temp 97.9 F, HR 96 bpm, /85 mmHg, SPO2 98% on room air, and RR 18. EKG showed NSR at 96 bpm w/ T wave inversion in V1, no acute ischemic changes. Labs showed WBC 10.79, K 3.8, and troponin negative x 1, otherwise within normal limits. CXR appeared unremarkable, official read pending. COVID PCR negative. In ED, patient was given Tylenol 650 mg PO once. Patient is now admitted to cardiology/telemetry for unstable angina w/ plan for cardiac catheterization w/ Dr. Zhong.  51 y/o French speaking male, current smoker, former marijuana smoker, questionably compliant patient, w/ FHX CAD (father, passed away at age 48) and PMHx HTN, HLD, and CAD s/p 4VCABG (at [facility] in 2019, LIMA-LAD, SVG-RCA, SVG-OM, Radial-Diag) who presented to [facility] ED c/o heavy breathing and SOB w/ minimal exertion and rest, stating he cannot even tolerate wearing a face mask at this time. Patient also endorsed left sided chest and back pain. Of note, patient also endorsed 15-20 days of rectal burning pain associated w/ intermittent diarrhea, the urge for anal itching, and BRBPR. Patient denied any dizziness, syncope, palpitations, abdominal pain, nausea/vomiting, LE edema, melena, fever, chills, cough. Patient had an outpatient NST on  11/05/2021 showing large, sized reversible perfusion defect of moderate-severe intensity involving the basal-apical inferior/inferolateral myocardial walls, suggestive of inducible myocardial ischemia in posterior coronary circulation, and EF 59%.     In ED, VS initially showed temp 97.9 F, HR 96 bpm, /85 mmHg, SPO2 98% on room air, and RR 18. EKG showed NSR at 96 bpm w/ T wave inversion in V1, no acute ischemic changes. Labs showed WBC 10.79, K 3.8, and troponin negative x 1, otherwise within normal limits. CXR appeared unremarkable, official read pending. COVID PCR negative. In ED, patient was given Tylenol 650 mg PO once. Patient is now admitted to cardiology/telemetry for unstable angina w/ plan for cardiac catheterization w/ Dr. Zhong.

## 2022-01-26 NOTE — DISCHARGE NOTE PROVIDER - NSDCQMACEBOTHER_CARD_A_CORE_FT
Pt is normotensive on current regimen, no DM or CKD.  Pt is normotensive on current regimen, no CKD or DM.

## 2022-01-26 NOTE — ED ADULT NURSE NOTE - CHIEF COMPLAINT QUOTE
51 y/o male c/o chest pain, back pain and SOB, brought from admitting for evaluation. as per admitting person patient was scheduled for cath lab today, however patient denies having an appointment today. Hx of open heart surgery 3 years ago. Lao  [phone]

## 2022-01-26 NOTE — ED ADULT NURSE NOTE - OBJECTIVE STATEMENT
51 y/o male c/o chest pain, left shoulder pain, pt sent by cardiac cath lab for evaluation.  Indonesian  [id number]

## 2022-01-26 NOTE — H&P ADULT - ASSESSMENT
51 y/o Slovak speaking male, current/former smoker, former marijuana smoker, questionably compliant patient, w/ FHX CAD (father, passed away at age 48) and PMHx HTN, HLD, and CAD s/p 4VCABG (at [facility] in 2019, LIMA-LAD, SVG-RCA, SVG-OM, Radial-Diag) who presented to [facility] ED w/ complaints of heavy breathing and SOB w/ minimal exertion to rest as well as left sided chest and back pain. Of note, patient also endorsed 15-20 days of rectal burning pain associated w/ intermittent diarrhea, the urge for anal itching, and BRBPR. Patient denied any additional symptoms. Outpatient NST showed large, sized reversible perfusion defect of moderate-severe intensity involving the basal-apical inferior/inferolateral myocardial walls, suggestive of inducible myocardial ischemia in posterior coronary circulation, and EF 59%. In ED, VS initially showed temp 97.9 F, HR 96 bpm, /85 mmHg, SPO2 98% on room air, and RR 18. EKG showed NSR at 96 bpm w/ T wave inversion in V1, no acute ischemic changes. Labs showed WBC 10.79, K 3.8, and troponin negative x 1, otherwise within normal limits. CXR appeared unremarkable, official read pending. COVID PCR negative. In ED, patient was given Tylenol 650 mg PO once. Patient is now admitted to cardiology/telemetry for unstable angina w/ plan for cardiac catheterization w/ possible intervention w/ Dr. Zhong. 51 y/o Hungarian speaking male, current smoker, former marijuana smoker, questionably compliant patient, w/ FHX CAD (father, passed away at age 48) and PMHx HTN, HLD, and CAD s/p 4VCABG (at [facility] in 2019, LIMA-LAD, SVG-RCA, SVG-OM, Radial-Diag) who presented to [facility] ED w/ complaints of heavy breathing and SOB w/ minimal exertion to rest as well as left sided chest and back pain. Of note, patient also endorsed 15-20 days of rectal burning pain associated w/ intermittent diarrhea, the urge for anal itching, and BRBPR. Patient denied any additional symptoms. Outpatient NST showed large, sized reversible perfusion defect of moderate-severe intensity involving the basal-apical inferior/inferolateral myocardial walls, suggestive of inducible myocardial ischemia in posterior coronary circulation, and EF 59%. In ED, VS initially showed temp 97.9 F, HR 96 bpm, /85 mmHg, SPO2 98% on room air, and RR 18. EKG showed NSR at 96 bpm w/ T wave inversion in V1, no acute ischemic changes. Labs showed WBC 10.79, K 3.8, and troponin negative x 1, otherwise within normal limits. CXR appeared unremarkable, official read pending. COVID PCR negative. In ED, patient was given Tylenol 650 mg PO once. Patient is now admitted to cardiology/telemetry for unstable angina w/ plan for cardiac catheterization w/ possible intervention w/ Dr. Zhong.

## 2022-01-26 NOTE — DISCHARGE NOTE PROVIDER - CARE PROVIDER_API CALL
Butch Zhong)  Cardiovascular Disease; Interventional Cardiology  [address] [place] [address]  Phone: [phone]  Fax: [phone]  Follow Up Time: 1 week

## 2022-01-26 NOTE — DISCHARGE NOTE PROVIDER - NSDCMRMEDTOKEN_GEN_ALL_CORE_FT
Aspirin Enteric Coated 81 mg oral delayed release tablet: 1 tab(s) orally once a day  atorvastatin 80 mg oral tablet: 1 tab(s) orally once a day  furosemide 20 mg oral tablet: 1 tab(s) orally once a day  Metoprolol Tartrate 25 mg oral tablet: 1 tab(s) orally 2 times a day  naproxen 500 mg oral tablet: 1 tab(s) orally 2 times a day, As Needed  nicotine 21 mg/24 hr transdermal film, extended release: 1 patch transdermal once a day   aspirin 81 mg oral delayed release tablet: 1 tab(s) orally once a day  atorvastatin 80 mg oral tablet: 1 tab(s) orally once a day  Cardiac Rehab 3 x/week x 12 weeks for diagnosis of Post PCI. Cardiologist Dr. hZong [phone]:   clopidogrel 75 mg oral tablet: 1 tab(s) orally once a day  furosemide 20 mg oral tablet: 1 tab(s) orally once a day  Metoprolol Tartrate 25 mg oral tablet: 1 tab(s) orally 2 times a day  nicotine 21 mg/24 hr transdermal film, extended release: 1 patch transdermal once a day  pantoprazole 40 mg oral delayed release tablet: 1 tab(s) orally once a day (before a meal)   aspirin 81 mg oral delayed release tablet: 1 tab(s) orally once a day  atorvastatin 80 mg oral tablet: 1 tab(s) orally once a day  Cardiac Rehab 3 x/week x 12 weeks for diagnosis of Post PCI. Cardiologist Dr. Zhong [phone]:   clopidogrel 75 mg oral tablet: 1 tab(s) orally once a day  furosemide 20 mg oral tablet: 1 tab(s) orally once a day  Metoprolol Tartrate 25 mg oral tablet: 1 tab(s) orally 2 times a day  nicotine 21 mg/24 hr transdermal film, extended release: 1 patch transdermal once a day  pantoprazole 40 mg oral delayed release tablet: 1 tab(s) orally once a day (before a meal)  Plavix 75 mg oral tablet: 1 tab(s) orally once a day    aspirin 81 mg oral delayed release tablet: 1 tab(s) orally once a day  atorvastatin 80 mg oral tablet: 1 tab(s) orally once a day  Cardiac Rehab 3 x/week x 12 weeks for diagnosis of Post PCI. Cardiologist Dr. Zhong [phone]:   clopidogrel 75 mg oral tablet: 1 tab(s) orally once a day  furosemide 20 mg oral tablet: 1 tab(s) orally once a day  Metoprolol Tartrate 25 mg oral tablet: 1 tab(s) orally 2 times a day  nicotine 21 mg/24 hr transdermal film, extended release: 1 patch transdermal once a day  pantoprazole 40 mg oral delayed release tablet: 1 tab(s) orally once a day (before a meal)  Zetia 10 mg oral tablet: 1 tab(s) orally once a day

## 2022-01-26 NOTE — H&P ADULT - NSHPSOCIALHISTORY_GEN_ALL_CORE
Patient formerly smoked 3 PPD prior to his surgery in 2019, and now smokes 1 PPD. Patient denied any current ETOH use or illicit drug use.

## 2022-01-27 ENCOUNTER — TRANSCRIPTION ENCOUNTER (OUTPATIENT)
Age: 52
End: 2022-01-27

## 2022-01-27 VITALS — TEMPERATURE: 97 F

## 2022-01-27 LAB
A1C WITH ESTIMATED AVERAGE GLUCOSE RESULT: 5.6 % — SIGNIFICANT CHANGE UP (ref 4–5.6)
ANION GAP SERPL CALC-SCNC: 8 MMOL/L — SIGNIFICANT CHANGE UP (ref 5–17)
BLD GP AB SCN SERPL QL: NEGATIVE — SIGNIFICANT CHANGE UP
BUN SERPL-MCNC: 14 MG/DL — SIGNIFICANT CHANGE UP (ref 7–23)
CALCIUM SERPL-MCNC: 8.3 MG/DL — LOW (ref 8.4–10.5)
CHLORIDE SERPL-SCNC: 106 MMOL/L — SIGNIFICANT CHANGE UP (ref 96–108)
CHOLEST SERPL-MCNC: 180 MG/DL — SIGNIFICANT CHANGE UP
CO2 SERPL-SCNC: 24 MMOL/L — SIGNIFICANT CHANGE UP (ref 22–31)
CREAT SERPL-MCNC: 0.83 MG/DL — SIGNIFICANT CHANGE UP (ref 0.5–1.3)
ESTIMATED AVERAGE GLUCOSE: 114 MG/DL — SIGNIFICANT CHANGE UP (ref 68–114)
GLUCOSE SERPL-MCNC: 82 MG/DL — SIGNIFICANT CHANGE UP (ref 70–99)
HCT VFR BLD CALC: 40.4 % — SIGNIFICANT CHANGE UP (ref 39–50)
HDLC SERPL-MCNC: 29 MG/DL — LOW
HGB BLD-MCNC: 12.5 G/DL — LOW (ref 13–17)
LIPID PNL WITH DIRECT LDL SERPL: 129 MG/DL — HIGH
MAGNESIUM SERPL-MCNC: 2 MG/DL — SIGNIFICANT CHANGE UP (ref 1.6–2.6)
MCHC RBC-ENTMCNC: 25.8 PG — LOW (ref 27–34)
MCHC RBC-ENTMCNC: 30.9 GM/DL — LOW (ref 32–36)
MCV RBC AUTO: 83.3 FL — SIGNIFICANT CHANGE UP (ref 80–100)
NON HDL CHOLESTEROL: 151 MG/DL — HIGH
NRBC # BLD: 0 /100 WBCS — SIGNIFICANT CHANGE UP (ref 0–0)
PLATELET # BLD AUTO: 361 K/UL — SIGNIFICANT CHANGE UP (ref 150–400)
POTASSIUM SERPL-MCNC: 3.9 MMOL/L — SIGNIFICANT CHANGE UP (ref 3.5–5.3)
POTASSIUM SERPL-SCNC: 3.9 MMOL/L — SIGNIFICANT CHANGE UP (ref 3.5–5.3)
RBC # BLD: 4.85 M/UL — SIGNIFICANT CHANGE UP (ref 4.2–5.8)
RBC # FLD: 16.8 % — HIGH (ref 10.3–14.5)
RH IG SCN BLD-IMP: POSITIVE — SIGNIFICANT CHANGE UP
SODIUM SERPL-SCNC: 138 MMOL/L — SIGNIFICANT CHANGE UP (ref 135–145)
TRIGL SERPL-MCNC: 111 MG/DL — SIGNIFICANT CHANGE UP
WBC # BLD: 8.98 K/UL — SIGNIFICANT CHANGE UP (ref 3.8–10.5)
WBC # FLD AUTO: 8.98 K/UL — SIGNIFICANT CHANGE UP (ref 3.8–10.5)

## 2022-01-27 PROCEDURE — 36415 COLL VENOUS BLD VENIPUNCTURE: CPT

## 2022-01-27 PROCEDURE — 80053 COMPREHEN METABOLIC PANEL: CPT

## 2022-01-27 PROCEDURE — 80048 BASIC METABOLIC PNL TOTAL CA: CPT

## 2022-01-27 PROCEDURE — 85025 COMPLETE CBC W/AUTO DIFF WBC: CPT

## 2022-01-27 PROCEDURE — 93306 TTE W/DOPPLER COMPLETE: CPT

## 2022-01-27 PROCEDURE — 86850 RBC ANTIBODY SCREEN: CPT

## 2022-01-27 PROCEDURE — C1760: CPT

## 2022-01-27 PROCEDURE — 99285 EMERGENCY DEPT VISIT HI MDM: CPT

## 2022-01-27 PROCEDURE — C1769: CPT

## 2022-01-27 PROCEDURE — 85027 COMPLETE CBC AUTOMATED: CPT

## 2022-01-27 PROCEDURE — C1725: CPT

## 2022-01-27 PROCEDURE — 84484 ASSAY OF TROPONIN QUANT: CPT

## 2022-01-27 PROCEDURE — C1887: CPT

## 2022-01-27 PROCEDURE — 90686 IIV4 VACC NO PRSV 0.5 ML IM: CPT

## 2022-01-27 PROCEDURE — C1874: CPT

## 2022-01-27 PROCEDURE — 99239 HOSP IP/OBS DSCHRG MGMT >30: CPT

## 2022-01-27 PROCEDURE — 93306 TTE W/DOPPLER COMPLETE: CPT | Mod: 26

## 2022-01-27 PROCEDURE — 86901 BLOOD TYPING SEROLOGIC RH(D): CPT

## 2022-01-27 PROCEDURE — 85730 THROMBOPLASTIN TIME PARTIAL: CPT

## 2022-01-27 PROCEDURE — C1894: CPT

## 2022-01-27 PROCEDURE — 83735 ASSAY OF MAGNESIUM: CPT

## 2022-01-27 PROCEDURE — 86900 BLOOD TYPING SEROLOGIC ABO: CPT

## 2022-01-27 PROCEDURE — 80061 LIPID PANEL: CPT

## 2022-01-27 PROCEDURE — 83880 ASSAY OF NATRIURETIC PEPTIDE: CPT

## 2022-01-27 PROCEDURE — 85610 PROTHROMBIN TIME: CPT

## 2022-01-27 PROCEDURE — 71045 X-RAY EXAM CHEST 1 VIEW: CPT

## 2022-01-27 PROCEDURE — 93005 ELECTROCARDIOGRAM TRACING: CPT

## 2022-01-27 PROCEDURE — 87635 SARS-COV-2 COVID-19 AMP PRB: CPT

## 2022-01-27 PROCEDURE — 83036 HEMOGLOBIN GLYCOSYLATED A1C: CPT

## 2022-01-27 RX ORDER — CLOPIDOGREL BISULFATE 75 MG/1
1 TABLET, FILM COATED ORAL
Qty: 30 | Refills: 11
Start: 2022-01-27 | End: 2023-01-21

## 2022-01-27 RX ORDER — EZETIMIBE 10 MG/1
1 TABLET ORAL
Qty: 30 | Refills: 3
Start: 2022-01-27 | End: 2022-05-26

## 2022-01-27 RX ORDER — ASPIRIN/CALCIUM CARB/MAGNESIUM 324 MG
1 TABLET ORAL
Qty: 30 | Refills: 11
Start: 2022-01-27 | End: 2023-01-21

## 2022-01-27 RX ORDER — CLOPIDOGREL BISULFATE 75 MG/1
1 TABLET, FILM COATED ORAL
Qty: 30 | Refills: 0
Start: 2022-01-27 | End: 2022-02-25

## 2022-01-27 RX ORDER — PANTOPRAZOLE SODIUM 20 MG/1
1 TABLET, DELAYED RELEASE ORAL
Qty: 30 | Refills: 3
Start: 2022-01-27 | End: 2022-05-26

## 2022-01-27 RX ORDER — ASPIRIN/CALCIUM CARB/MAGNESIUM 324 MG
1 TABLET ORAL
Qty: 0 | Refills: 0 | DISCHARGE

## 2022-01-27 RX ORDER — PHENYLEPHRINE-SHARK LIVER OIL-MINERAL OIL-PETROLATUM RECTAL OINTMENT
1 OINTMENT (GRAM) RECTAL
Refills: 0 | Status: DISCONTINUED | OUTPATIENT
Start: 2022-01-27 | End: 2022-01-27

## 2022-01-27 RX ORDER — POTASSIUM CHLORIDE 20 MEQ
10 PACKET (EA) ORAL ONCE
Refills: 0 | Status: COMPLETED | OUTPATIENT
Start: 2022-01-27 | End: 2022-01-27

## 2022-01-27 RX ORDER — EZETIMIBE 10 MG/1
1 TABLET ORAL
Qty: 30 | Refills: 0
Start: 2022-01-27 | End: 2022-02-25

## 2022-01-27 RX ORDER — PANTOPRAZOLE SODIUM 20 MG/1
40 TABLET, DELAYED RELEASE ORAL
Refills: 0 | Status: DISCONTINUED | OUTPATIENT
Start: 2022-01-27 | End: 2022-01-27

## 2022-01-27 RX ADMIN — Medication 10 MILLIEQUIVALENT(S): at 10:46

## 2022-01-27 RX ADMIN — LIDOCAINE 1 PATCH: 4 CREAM TOPICAL at 06:18

## 2022-01-27 RX ADMIN — LIDOCAINE 1 PATCH: 4 CREAM TOPICAL at 14:14

## 2022-01-27 RX ADMIN — CLOPIDOGREL BISULFATE 75 MILLIGRAM(S): 75 TABLET, FILM COATED ORAL at 14:13

## 2022-01-27 RX ADMIN — Medication 25 MILLIGRAM(S): at 06:04

## 2022-01-27 RX ADMIN — Medication 1 PATCH: at 07:11

## 2022-01-27 RX ADMIN — Medication 81 MILLIGRAM(S): at 14:13

## 2022-01-27 RX ADMIN — Medication 650 MILLIGRAM(S): at 00:53

## 2022-01-27 RX ADMIN — Medication 650 MILLIGRAM(S): at 01:53

## 2022-01-27 RX ADMIN — PANTOPRAZOLE SODIUM 40 MILLIGRAM(S): 20 TABLET, DELAYED RELEASE ORAL at 10:47

## 2022-01-27 RX ADMIN — INFLUENZA VIRUS VACCINE 0.5 MILLILITER(S): 15; 15; 15; 15 SUSPENSION INTRAMUSCULAR at 14:14

## 2022-01-27 NOTE — DISCHARGE NOTE NURSING/CASE MANAGEMENT/SOCIAL WORK - NSDCVIVACCINE_GEN_ALL_CORE_FT
No Vaccines Administered. influenza, injectable, quadrivalent, preservative free; 27-Jan-2022 14:14; Mary Velazquez ([employer]); [employer]; [id number] (Exp. Date: 30-Jun-2022); IntraMuscular; Deltoid Right.; 0.5 milliLiter(s); VIS (VIS Published: 06-Aug-2021, VIS Presented: 27-Jan-2022);

## 2022-01-27 NOTE — DISCHARGE NOTE NURSING/CASE MANAGEMENT/SOCIAL WORK - NSDCPEFALRISK_GEN_ALL_CORE
For information on Fall & Injury Prevention, visit: [email or web address] OR  [email or web address] OR  https://www.cdc.gov/steadi/patient.html

## 2022-01-27 NOTE — DISCHARGE NOTE NURSING/CASE MANAGEMENT/SOCIAL WORK - PATIENT PORTAL LINK FT
You can access the FollowMyHealth Patient Portal offered by [facility] by registering at the following website: [email or web address]. By joining [facility]’s FollowMyHealth portal, you will also be able to view your health information using other applications (apps) compatible with our system. Protopic Pregnancy And Lactation Text: This medication is Pregnancy Category C. It is unknown if this medication is excreted in breast milk when applied topically.

## 2022-01-31 DIAGNOSIS — Z82.49 FAMILY HISTORY OF ISCHEMIC HEART DISEASE AND OTHER DISEASES OF THE CIRCULATORY SYSTEM: ICD-10-CM

## 2022-01-31 DIAGNOSIS — I25.110 ATHEROSCLEROTIC HEART DISEASE OF NATIVE CORONARY ARTERY WITH UNSTABLE ANGINA PECTORIS: ICD-10-CM

## 2022-01-31 DIAGNOSIS — E78.5 HYPERLIPIDEMIA, UNSPECIFIED: ICD-10-CM

## 2022-01-31 DIAGNOSIS — I10 ESSENTIAL (PRIMARY) HYPERTENSION: ICD-10-CM

## 2022-01-31 DIAGNOSIS — Z95.1 PRESENCE OF AORTOCORONARY BYPASS GRAFT: ICD-10-CM

## 2022-01-31 DIAGNOSIS — I25.2 OLD MYOCARDIAL INFARCTION: ICD-10-CM

## 2022-01-31 DIAGNOSIS — Z79.82 LONG TERM (CURRENT) USE OF ASPIRIN: ICD-10-CM

## 2022-01-31 DIAGNOSIS — K64.9 UNSPECIFIED HEMORRHOIDS: ICD-10-CM

## 2022-01-31 DIAGNOSIS — F17.210 NICOTINE DEPENDENCE, CIGARETTES, UNCOMPLICATED: ICD-10-CM

## 2022-08-02 NOTE — ED PROVIDER NOTE - INPATIENT RESIDENT/ACP NOTIFIED DATE
[facility]          Phone: [phone] Fax: [phone]    Physical Therapy Daily Treatment Note  Date:  2022    Patient Name:  Ambar Quijano    :  [birth year]  MRN: [id number]    Evaluating therapist: Mario Begum PT, DPT  [id number]    Restrictions/Precautions:  tender to palpation    Diagnosis:     Diagnosis Orders   1. Osteoarthritis of shoulder, unspecified laterality, unspecified osteoarthritis type        2. Osteoarthritis of left knee, unspecified osteoarthritis type          Treatment Diagnosis:    Insurance/Certification information:  [employer] Medicare Advantage PPO   Referring Physician:  Cassia Ferguson MD  Plan of care signed (Y/N):    Visit# / total visits:    Pain level: 8/10   Time In:  1000  Time Out:      Subjective:  see initial eval     Exercises:  Exercise/Equipment Resistance/Repetitions Other comments     Seated hamstring stretch 2x 30 sec hold      Seated heel slide 10x L LE      LAQ 10x L LE      AAROM shoulder flexion 10x ea side      AAROM shoulder ER 10x ea side      AAROM shoulder abduction 10x ea side                                                                                                    Other Therapeutic Activities:  Pt tolerated all TE's with limited pain and discomfort. Pt stated that during the shoulder exercises she felt a \"click/pop\". Pt was asked to only into move into shoulder motions that were a tolerable pain level. Pt stated that she \"felt that the exercises were helping and that she is hopefully that these will help her get better\".     Home Exercise Program:  Seated hamstring stretch, Seated heel slides, LAQ, AAROM shoulder flexion, AAROM shoulder Abduction, AAROM shoulder ER    Manual Treatments:  n/a    Modalities:  n/a     Time-in Time-out Total Time   25376  Evaluation Low Complexity 1000 1020 20   37948  Evaluation Med Complexity      22571  Evaluation High Complexity      73267  Ther Ex 1020 1040 20   83027 Neuro Re-ed        03739  Ther Activities        01298  Manual Therapy       35111  E-stim       84037  Ultrasound            Session 1729 [id number] [id number]       Treatment/Activity Tolerance:  [x] Patient tolerated treatment well [] Patient limited by fatigue  [] Patient limited by pain  [] Patient limited by other medical complications  [] Other:     Prognosis: [x] Good [] Fair  [] Poor    Patient Requires Follow-up: [x] Yes  [] No    Plan:   [x] Continue per plan of care [] Alter current plan (see comments)  [] Plan of care initiated [] Hold pending MD visit [] Discharge    Plan for Next Session:    Increase strength and ROM of B shoulders and L knee.        Electronically signed by:    Monserrat Ocampo, ERNESTINE Collins, PT, DPT  [id number] 26-Jan-2022 11:43

## 2023-03-09 NOTE — PATIENT PROFILE ADULT - FALL HARM RISK - HARM RISK INTERVENTIONS
Assistance with ambulation/Assistance OOB with selected safe patient handling equipment/Communicate Risk of Fall with Harm to all staff/Monitor gait and stability/Reinforce activity limits and safety measures with patient and family/Sit up slowly, dangle for a short time, stand at bedside before walking/Tailored Fall Risk Interventions/Use of alarms - bed, chair and/or voice tab/Visual Cue: Yellow wristband and red socks/Bed in lowest position, wheels locked, appropriate side rails in place/Call bell, personal items and telephone in reach/Instruct patient to call for assistance before getting out of bed or chair/Non-slip footwear when patient is out of bed/[place] to call system/Physically safe environment - no spills, clutter or unnecessary equipment/Purposeful Proactive Rounding/Room/bathroom lighting operational, light cord in reach Wartpeel Counseling:  I discussed with the patient the risks of Wartpeel including but not limited to erythema, scaling, itching, weeping, crusting, and pain.

## 2023-04-11 VITALS
OXYGEN SATURATION: 94 % | WEIGHT: 156.53 LBS | SYSTOLIC BLOOD PRESSURE: 127 MMHG | RESPIRATION RATE: 15 BRPM | HEART RATE: 83 BPM | HEIGHT: 67.32 IN | TEMPERATURE: 98 F | DIASTOLIC BLOOD PRESSURE: 75 MMHG

## 2023-04-11 PROBLEM — E78.5 HYPERLIPIDEMIA, UNSPECIFIED: Chronic | Status: ACTIVE | Noted: 2022-01-26

## 2023-04-11 PROBLEM — I10 ESSENTIAL (PRIMARY) HYPERTENSION: Chronic | Status: ACTIVE | Noted: 2022-01-26

## 2023-04-11 PROBLEM — I25.10 ATHEROSCLEROTIC HEART DISEASE OF NATIVE CORONARY ARTERY WITHOUT ANGINA PECTORIS: Chronic | Status: ACTIVE | Noted: 2022-01-26

## 2023-04-11 RX ORDER — CHLORHEXIDINE GLUCONATE 213 G/1000ML
1 SOLUTION TOPICAL ONCE
Refills: 0 | Status: DISCONTINUED | OUTPATIENT
Start: 2023-04-12 | End: 2023-04-26

## 2023-04-11 NOTE — H&P ADULT - NSHPLABSRESULTS_GEN_ALL_CORE
14.0   9.11  )-----------( 343      ( 12 Apr 2023 09:54 )             43.9       04-12    142  |  109<H>  |  15  ----------------------------<  96  4.0   |  23  |  0.72    Ca    8.9      12 Apr 2023 09:54  Mg     1.9     04-12    TPro  7.1  /  Alb  4.0  /  TBili  0.2  /  DBili  x   /  AST  15  /  ALT  15  /  AlkPhos  92  04-12      PT/INR - ( 12 Apr 2023 09:54 )   PT: 12.8 sec;   INR: 1.07          PTT - ( 12 Apr 2023 09:54 )  PTT:33.7 sec    CARDIAC MARKERS ( 12 Apr 2023 09:54 )  x     / x     / 102 U/L / x     / 2.0 ng/mL            EKG 4/12/23 : 84 BPM, NSR TWI in V1

## 2023-04-11 NOTE — H&P ADULT - ASSESSMENT
Pt is 52yo Telugu-speaking M  current smoker, with FHx of CAD, / PMHx of HTN, HLD, CAD s/p 4VCABG 2019  (LIMA-LAD, SVG-Ramus, SVG-RPDA, Radial-Diag), s/p subsequent PCI 1/23/22: (JEN pLCx )who presents to [facility] for cardiac cath with possible intervention if clinically indicated in light of patient's risk factors, CCS Class III anginal symptoms, and abnormal NST, patient now presents to [facility] for cardiac catheterization with possible intervention if clinically indicated.     ASA III          Mallampati II    Patient is a candidate for sedation: yes  Sedation: Moderate    Risks & benefits of procedure and alternative therapy have been explained to the patient including but not limited to: allergic reaction, bleeding w/possible need for blood transfusion, infection, renal and vascular compromise, limb damage, arrhythmia, stroke, vessel dissection/perforation, Myocardial infarction, emergent CABG. Informed consent obtained and in chart.       Patient denies bleeding, BRBPR, melena, hematuria, hematemesis.    [] Unclear whether patient takes ASA 81mg at home, will load with  mg/Plavix 600 mg   []  cc Bolus IV x 1 followed by NS 75 cc/hr x 2 hrs per Hydration Protocol.   [] Mg 1.9, replete with MagOx 400 mg x1  [] Consent obtained  (#[id number]) and in chart   Pt is 52yo Yakut-speaking M  current smoker, with FHx of CAD, / PMHx of HTN, HLD, CAD s/p 4VCABG 2019  (LIMA-LAD, SVG-Ramus, SVG-RPDA, Radial-Diag), s/p subsequent PCI 1/23/22: (JEN pLCx )who presents to [facility] for cardiac cath with possible intervention if clinically indicated in light of patient's risk factors, CCS Class III anginal symptoms, and abnormal NST    ASA III          Mallampati II    Patient is a candidate for sedation: yes  Sedation: Moderate    Risks & benefits of procedure and alternative therapy have been explained to the patient including but not limited to: allergic reaction, bleeding w/possible need for blood transfusion, infection, renal and vascular compromise, limb damage, arrhythmia, stroke, vessel dissection/perforation, Myocardial infarction, emergent CABG. Informed consent obtained and in chart.       Patient denies bleeding, BRBPR, melena, hematuria, hematemesis.    [] Unclear whether patient takes ASA 81mg at home, will load with  mg/Plavix 600 mg   []  cc Bolus IV x 1 followed by NS 75 cc/hr x 2 hrs per Hydration Protocol.   [] Mg 1.9, replete with MagOx 400 mg x1  [] Consent obtained  (#[id number]) and in chart

## 2023-04-11 NOTE — H&P ADULT - HISTORY OF PRESENT ILLNESS
CARDIOLOGIST:   COVID:   PHARMACY:   ESCORT:     Pt is 52yo Mohawk-speaking M w/ PMHx of HTN, HLD, CAD (s/p 4VCABG 2019 w/ LIMA-LAD, SVG-Ramus, SVG-RPDA, Radial-Diag) who presented to his cardiologist, Dr. Zhong, endorsing ___________________. Pt denies ___________________.     NST (4/1/23): medium size reversible perfusion defect of moderate intensity in entire (basal-apical) inferior/inferolateral myocardial walls.   Cardiac Cath (1/2022): PTCA/JEN pLCx; LM normal, mLAD 90%, D1 90%, Danisha 80%, OM1 100% , pRCA 100% ; LIMA-LAD patent, Radial-Diag patent graft, SVG-Ramus patent graft, SVG-RPDA patent graft.     In light of patient's risk factors, CCS Class ____ anginal symptoms, and abnormal NST, patient now presents to [facility] for cardiac catheterization with possible intervention if clinically indicated.  CARDIOLOGIST:   COVID:   PHARMACY:   ESCORT:     Pt is 52yo Azeri-speaking M  current smoker, with FHx of CAD, / PMHx of HTN, HLD, CAD s/p 4VCABG 2019  (LIMA-LAD, SVG-Ramus, SVG-RPDA, Radial-Diag), s/p subsequent PCI 1/23/22: (JEN pLCx )who presented to his cardiologist, Dr. Zhong, endorsing ___________________. Pt denies ___________________.   NST (4/1/23): medium size reversible perfusion defect of moderate intensity in entire (basal-apical) inferior/inferolateral myocardial walls.   Cardiac Cath (1/2022): PTCA/JEN pLCx; LM normal, mLAD 90%, D1 90%, Danisha 80%, OM1 100% , pRCA 100% ; LIMA-LAD patent, Radial-Diag patent graft, SVG-Ramus patent graft, SVG-RPDA patent graft.   TTE (01/27/22) Normal LVof 55%-60%, no significant valvular disease or wall motion abnormalities.      In light of patient's risk factors, CCS Class ____ anginal symptoms, and abnormal NST, patient now presents to [facility] for cardiac catheterization with possible intervention if clinically indicated.  CARDIOLOGIST: Dr. Zhong   COVID: asx, will swab if needs to be admitted   PHARMACY: [facility] ([phone])  ESCORT: None     Pt is 54yo Thai-speaking M  current smoker, with FHx of CAD, / PMHx of HTN, HLD, CAD s/p 4VCABG 2019  (LIMA-LAD, SVG-Ramus, SVG-RPDA, Radial-Diag), s/p subsequent PCI 1/23/22: (JEN pLCx )who presented to his cardiologist, Dr. Zhong, endorsing severe left sided CP and SOB that started about 20 days. Also mentions SHAIKH and is only able to walk 2-3 blocks without rest. Endorses PND and orthopnea. Denies HA, dizziness, syncope, diaphoresis, cough, abdominal pain, nausea, vomiting, melena, BRPBPR, LE swelling, fever, chills, or sick contacts. NST (4/1/23): medium size reversible perfusion defect of moderate intensity in entire (basal-apical) inferior/inferolateral myocardial walls. Cardiac Cath (1/2022): PTCA/JEN pLCx; LM normal, mLAD 90%, D1 90%, Danisha 80%, OM1 100% , pRCA 100% ; LIMA-LAD patent, Radial-Diag patent graft, SVG-Ramus patent graft, SVG-RPDA patent graft. TTE (01/27/22) Normal LVof 55%-60%, no significant valvular disease or wall motion abnormalities.      In light of patient's risk factors, CCS Class III anginal symptoms, and abnormal NST, patient now presents to [facility] for cardiac catheterization with possible intervention if clinically indicated.  CARDIOLOGIST: Dr. Zhong   COVID: asx, no swab   PHARMACY: [facility] ([phone])  ESCORT: None     Pt is 54yo Bulgarian-speaking M  current smoker, with FHx of CAD, / PMHx of HTN, HLD, CAD s/p 4VCABG 2019  (LIMA-LAD, SVG-Ramus, SVG-RPDA, Radial-Diag), s/p subsequent PCI 1/23/22: (JEN pLCx )who presented to his cardiologist, Dr. Zhong, endorsing severe left sided CP and SOB that started about 20 days. Also mentions SHAIKH and is only able to walk 2-3 blocks without rest. Endorses PND and orthopnea. Denies HA, dizziness, syncope, diaphoresis, cough, abdominal pain, nausea, vomiting, melena, BRPBPR, LE swelling, fever, chills, or sick contacts. NST (4/1/23): medium size reversible perfusion defect of moderate intensity in entire (basal-apical) inferior/inferolateral myocardial walls. Cardiac Cath (1/2022): PTCA/JEN pLCx; LM normal, mLAD 90%, D1 90%, Danisha 80%, OM1 100% , pRCA 100% ; LIMA-LAD patent, Radial-Diag patent graft, SVG-Ramus patent graft, SVG-RPDA patent graft. TTE (01/27/22) Normal LVof 55%-60%, no significant valvular disease or wall motion abnormalities.      In light of patient's risk factors, CCS Class III anginal symptoms, and abnormal NST, patient now presents to [facility] for cardiac catheterization with possible intervention if clinically indicated.

## 2023-04-12 ENCOUNTER — OUTPATIENT (OUTPATIENT)
Dept: OUTPATIENT SERVICES | Facility: HOSPITAL | Age: 53
LOS: 1 days | Discharge: HOME | End: 2023-04-12
Payer: MEDICAID

## 2023-04-12 DIAGNOSIS — Z95.1 PRESENCE OF AORTOCORONARY BYPASS GRAFT: Chronic | ICD-10-CM

## 2023-04-12 LAB
A1C WITH ESTIMATED AVERAGE GLUCOSE RESULT: 5.5 % — SIGNIFICANT CHANGE UP (ref 4–5.6)
ALBUMIN SERPL ELPH-MCNC: 4 G/DL — SIGNIFICANT CHANGE UP (ref 3.3–5)
ALP SERPL-CCNC: 92 U/L — SIGNIFICANT CHANGE UP (ref 40–120)
ALT FLD-CCNC: 15 U/L — SIGNIFICANT CHANGE UP (ref 10–45)
ANION GAP SERPL CALC-SCNC: 10 MMOL/L — SIGNIFICANT CHANGE UP (ref 5–17)
APTT BLD: 33.7 SEC — SIGNIFICANT CHANGE UP (ref 27.5–35.5)
AST SERPL-CCNC: 15 U/L — SIGNIFICANT CHANGE UP (ref 10–40)
BASOPHILS # BLD AUTO: 0.03 K/UL — SIGNIFICANT CHANGE UP (ref 0–0.2)
BASOPHILS NFR BLD AUTO: 0.3 % — SIGNIFICANT CHANGE UP (ref 0–2)
BILIRUB SERPL-MCNC: 0.2 MG/DL — SIGNIFICANT CHANGE UP (ref 0.2–1.2)
BUN SERPL-MCNC: 15 MG/DL — SIGNIFICANT CHANGE UP (ref 7–23)
CALCIUM SERPL-MCNC: 8.9 MG/DL — SIGNIFICANT CHANGE UP (ref 8.4–10.5)
CHLORIDE SERPL-SCNC: 109 MMOL/L — HIGH (ref 96–108)
CHOLEST SERPL-MCNC: 170 MG/DL — SIGNIFICANT CHANGE UP
CK MB CFR SERPL CALC: 2 NG/ML — SIGNIFICANT CHANGE UP (ref 0–6.7)
CK SERPL-CCNC: 102 U/L — SIGNIFICANT CHANGE UP (ref 30–200)
CO2 SERPL-SCNC: 23 MMOL/L — SIGNIFICANT CHANGE UP (ref 22–31)
CREAT SERPL-MCNC: 0.72 MG/DL — SIGNIFICANT CHANGE UP (ref 0.5–1.3)
EGFR: 111 ML/MIN/1.73M2 — SIGNIFICANT CHANGE UP
EOSINOPHIL # BLD AUTO: 0.14 K/UL — SIGNIFICANT CHANGE UP (ref 0–0.5)
EOSINOPHIL NFR BLD AUTO: 1.5 % — SIGNIFICANT CHANGE UP (ref 0–6)
ESTIMATED AVERAGE GLUCOSE: 111 MG/DL — SIGNIFICANT CHANGE UP (ref 68–114)
GLUCOSE SERPL-MCNC: 96 MG/DL — SIGNIFICANT CHANGE UP (ref 70–99)
HCT VFR BLD CALC: 43.9 % — SIGNIFICANT CHANGE UP (ref 39–50)
HDLC SERPL-MCNC: 35 MG/DL — LOW
HGB BLD-MCNC: 14 G/DL — SIGNIFICANT CHANGE UP (ref 13–17)
IMM GRANULOCYTES NFR BLD AUTO: 0.2 % — SIGNIFICANT CHANGE UP (ref 0–0.9)
INR BLD: 1.07 — SIGNIFICANT CHANGE UP (ref 0.88–1.16)
LIPID PNL WITH DIRECT LDL SERPL: 118 MG/DL — HIGH
LYMPHOCYTES # BLD AUTO: 2.57 K/UL — SIGNIFICANT CHANGE UP (ref 1–3.3)
LYMPHOCYTES # BLD AUTO: 28.2 % — SIGNIFICANT CHANGE UP (ref 13–44)
MAGNESIUM SERPL-MCNC: 1.9 MG/DL — SIGNIFICANT CHANGE UP (ref 1.6–2.6)
MCHC RBC-ENTMCNC: 27.1 PG — SIGNIFICANT CHANGE UP (ref 27–34)
MCHC RBC-ENTMCNC: 31.9 GM/DL — LOW (ref 32–36)
MCV RBC AUTO: 85.1 FL — SIGNIFICANT CHANGE UP (ref 80–100)
MONOCYTES # BLD AUTO: 1.01 K/UL — HIGH (ref 0–0.9)
MONOCYTES NFR BLD AUTO: 11.1 % — SIGNIFICANT CHANGE UP (ref 2–14)
NEUTROPHILS # BLD AUTO: 5.34 K/UL — SIGNIFICANT CHANGE UP (ref 1.8–7.4)
NEUTROPHILS NFR BLD AUTO: 58.7 % — SIGNIFICANT CHANGE UP (ref 43–77)
NON HDL CHOLESTEROL: 135 MG/DL — HIGH
NRBC # BLD: 0 /100 WBCS — SIGNIFICANT CHANGE UP (ref 0–0)
PLATELET # BLD AUTO: 343 K/UL — SIGNIFICANT CHANGE UP (ref 150–400)
POTASSIUM SERPL-MCNC: 4 MMOL/L — SIGNIFICANT CHANGE UP (ref 3.5–5.3)
POTASSIUM SERPL-SCNC: 4 MMOL/L — SIGNIFICANT CHANGE UP (ref 3.5–5.3)
PROT SERPL-MCNC: 7.1 G/DL — SIGNIFICANT CHANGE UP (ref 6–8.3)
PROTHROM AB SERPL-ACNC: 12.8 SEC — SIGNIFICANT CHANGE UP (ref 10.5–13.4)
RBC # BLD: 5.16 M/UL — SIGNIFICANT CHANGE UP (ref 4.2–5.8)
RBC # FLD: 15.2 % — HIGH (ref 10.3–14.5)
SODIUM SERPL-SCNC: 142 MMOL/L — SIGNIFICANT CHANGE UP (ref 135–145)
TRIGL SERPL-MCNC: 83 MG/DL — SIGNIFICANT CHANGE UP
WBC # BLD: 9.11 K/UL — SIGNIFICANT CHANGE UP (ref 3.8–10.5)
WBC # FLD AUTO: 9.11 K/UL — SIGNIFICANT CHANGE UP (ref 3.8–10.5)

## 2023-04-12 PROCEDURE — C1887: CPT

## 2023-04-12 PROCEDURE — 80053 COMPREHEN METABOLIC PANEL: CPT

## 2023-04-12 PROCEDURE — 93010 ELECTROCARDIOGRAM REPORT: CPT

## 2023-04-12 PROCEDURE — 85610 PROTHROMBIN TIME: CPT

## 2023-04-12 PROCEDURE — C1894: CPT

## 2023-04-12 PROCEDURE — 82553 CREATINE MB FRACTION: CPT

## 2023-04-12 PROCEDURE — 85730 THROMBOPLASTIN TIME PARTIAL: CPT

## 2023-04-12 PROCEDURE — 93005 ELECTROCARDIOGRAM TRACING: CPT

## 2023-04-12 PROCEDURE — 99152 MOD SED SAME PHYS/QHP 5/>YRS: CPT

## 2023-04-12 PROCEDURE — 80061 LIPID PANEL: CPT

## 2023-04-12 PROCEDURE — C1769: CPT

## 2023-04-12 PROCEDURE — 93459 L HRT ART/GRFT ANGIO: CPT | Mod: 26

## 2023-04-12 PROCEDURE — 83735 ASSAY OF MAGNESIUM: CPT

## 2023-04-12 PROCEDURE — 93459 L HRT ART/GRFT ANGIO: CPT

## 2023-04-12 PROCEDURE — 83036 HEMOGLOBIN GLYCOSYLATED A1C: CPT

## 2023-04-12 PROCEDURE — 85025 COMPLETE CBC W/AUTO DIFF WBC: CPT

## 2023-04-12 PROCEDURE — C1760: CPT

## 2023-04-12 PROCEDURE — 36415 COLL VENOUS BLD VENIPUNCTURE: CPT

## 2023-04-12 PROCEDURE — 82550 ASSAY OF CK (CPK): CPT

## 2023-04-12 RX ORDER — SODIUM CHLORIDE 9 MG/ML
250 INJECTION INTRAMUSCULAR; INTRAVENOUS; SUBCUTANEOUS ONCE
Refills: 0 | Status: COMPLETED | OUTPATIENT
Start: 2023-04-12 | End: 2023-04-12

## 2023-04-12 RX ORDER — NICOTINE POLACRILEX 2 MG
1 GUM BUCCAL
Qty: 0 | Refills: 0 | DISCHARGE

## 2023-04-12 RX ORDER — FUROSEMIDE 40 MG
1 TABLET ORAL
Refills: 0 | DISCHARGE

## 2023-04-12 RX ORDER — SODIUM CHLORIDE 9 MG/ML
500 INJECTION INTRAMUSCULAR; INTRAVENOUS; SUBCUTANEOUS
Refills: 0 | Status: DISCONTINUED | OUTPATIENT
Start: 2023-04-12 | End: 2023-04-12

## 2023-04-12 RX ORDER — CLOPIDOGREL BISULFATE 75 MG/1
600 TABLET, FILM COATED ORAL ONCE
Refills: 0 | Status: COMPLETED | OUTPATIENT
Start: 2023-04-12 | End: 2023-04-12

## 2023-04-12 RX ORDER — METOPROLOL TARTRATE 50 MG
1 TABLET ORAL
Qty: 0 | Refills: 0 | DISCHARGE

## 2023-04-12 RX ORDER — ATORVASTATIN CALCIUM 80 MG/1
1 TABLET, FILM COATED ORAL
Qty: 0 | Refills: 0 | DISCHARGE

## 2023-04-12 RX ORDER — FUROSEMIDE 40 MG
1 TABLET ORAL
Qty: 0 | Refills: 0 | DISCHARGE

## 2023-04-12 RX ORDER — MAGNESIUM OXIDE 400 MG ORAL TABLET 241.3 MG
400 TABLET ORAL ONCE
Refills: 0 | Status: COMPLETED | OUTPATIENT
Start: 2023-04-12 | End: 2023-04-12

## 2023-04-12 RX ORDER — ASPIRIN/CALCIUM CARB/MAGNESIUM 324 MG
325 TABLET ORAL ONCE
Refills: 0 | Status: COMPLETED | OUTPATIENT
Start: 2023-04-12 | End: 2023-04-12

## 2023-04-12 RX ORDER — SODIUM CHLORIDE 9 MG/ML
500 INJECTION INTRAMUSCULAR; INTRAVENOUS; SUBCUTANEOUS
Refills: 0 | Status: DISCONTINUED | OUTPATIENT
Start: 2023-04-12 | End: 2023-04-26

## 2023-04-12 RX ADMIN — Medication 325 MILLIGRAM(S): at 11:59

## 2023-04-12 RX ADMIN — SODIUM CHLORIDE 210 MILLILITER(S): 9 INJECTION INTRAMUSCULAR; INTRAVENOUS; SUBCUTANEOUS at 14:08

## 2023-04-12 RX ADMIN — MAGNESIUM OXIDE 400 MG ORAL TABLET 400 MILLIGRAM(S): 241.3 TABLET ORAL at 11:59

## 2023-04-12 RX ADMIN — SODIUM CHLORIDE 500 MILLILITER(S): 9 INJECTION INTRAMUSCULAR; INTRAVENOUS; SUBCUTANEOUS at 11:59

## 2023-04-12 RX ADMIN — SODIUM CHLORIDE 75 MILLILITER(S): 9 INJECTION INTRAMUSCULAR; INTRAVENOUS; SUBCUTANEOUS at 12:00

## 2023-04-12 RX ADMIN — CLOPIDOGREL BISULFATE 600 MILLIGRAM(S): 75 TABLET, FILM COATED ORAL at 11:59

## 2023-04-12 NOTE — PROGRESS NOTE ADULT - SUBJECTIVE AND OBJECTIVE BOX
Interventional Cardiology PA SDA Discharge Note    Patient without complaints. Ambulated and voided without difficulties    Afebrile, VSS    Ext:    Right   Groin:    no   hematoma,   no  bruit, dressing; C/D/I      Pulses:    intact DP/PT to baseline     A/P:  52yo Yi-speaking M  current smoker, with FHx of CAD, / PMHx of HTN, HLD, CAD s/p 4VCABG 2019  (LIMA-LAD, SVG-Ramus, SVG-RPDA, Radial-Diag), s/p subsequent PCI 1/23/22: (JEN pLCx )who presents to [facility] for cardiac cath with possible intervention if clinically indicated in light of patient's risk factors, CCS Class III anginal symptoms, and abnormal NST    RFA AS, LM mild, mLAD , pRamus , pLCx stent patent, OM1 , pRCA , patent LIMA-LAD, patent Radial-D1, patent SVG-RPDA, occluded SVG-Ramus, EDP 16,    1.	Stable for discharge as per attending Dr. Zhong after bed rest, pt voids, groin stable and 30 minutes of ambulation.  2.	Follow-up with PMD/Cardiologist Dr. Zhong in 1-2 weeks  3.	Discharged forms signed and copies in chart

## 2023-04-14 DIAGNOSIS — I25.718 ATHEROSCLEROSIS OF AUTOLOGOUS VEIN CORONARY ARTERY BYPASS GRAFT(S) WITH OTHER FORMS OF ANGINA PECTORIS: ICD-10-CM

## 2023-04-14 DIAGNOSIS — R94.39 ABNORMAL RESULT OF OTHER CARDIOVASCULAR FUNCTION STUDY: ICD-10-CM

## 2023-04-14 DIAGNOSIS — I25.82 CHRONIC TOTAL OCCLUSION OF CORONARY ARTERY: ICD-10-CM

## 2023-05-10 NOTE — CONSULT NOTE ADULT - CONSULT REASON
Physical Therapy Evaluation    Visit Type: Initial Evaluation  Visit: 1  Referring Provider: Arnold Silva,*  Medical Diagnosis (from order): Diagnosis Information    Diagnosis  721.0 (ICD-9-CM) - M47.22 (ICD-10-CM) - Cervical radiculopathy due to degenerative joint disease of spine       Treatment Diagnosis: cervical - impaired coordination, impaired posture, headaches, increased pain/symptoms, impaired range of motion, impaired joint play/mobility and impaired tissue mobility.  Onset  - Date of onset: 2/1/2021  Chart reviewed at time of initial evaluation (relevant co-morbidities, allergies, tests and medications listed):   - Diagnostic tests reviewed: X-Ray  Past Medical History:  No date: Arthritis      Comment:  right hip  No date: Essential (primary) hypertension  No date: Hemorrhoids  No date: Kidney function abnormal    Past Surgical History:  No date: Colonoscopy  01/2022: Hemorhoidectomy int ext single column group  1996: Joint replacement; Left      Comment:  replaced and reconstructed  KNEE  08/2022: Total hip replacement; Right        SUBJECTIVE                                                                                                               Patient denies a specific mechanism or date of injury but says over the last few years it has gotten to be worse. He says he feels pressure in his neck that is only relieved when he lies down. He says that during lying down his body is relaxed. He complains of some pain down his right arm that will also cause his arm to go to sleep due to the discomfort. He says he will get pain down both sides of his neck and up into his head. He says that he is sitting a lot with a mild slouching forward of his head and upper back, and he thinks that may be causing more problems. He also does notice that over the last year and a half he has lost about 150 lbs. 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     Pain /  Symptoms  - Pain/symptom is: intermittent  - Pain rating (out of 10): Current: 5   - Location: Middle of neck and up into his head (right side worse than his left)  - Quality / Description: ache, pressure, stiff  - Alleviating Factors: heat, rest     - Lying down    Function:   Limitations / Exacerbation Factors:   - sleep disturbed, driving/riding in a vehicle, lifting/carrying  Prior Level of Function: declining function, therefore referred to therapy,    Patient Goals: decreased pain, independence with ADLs/IADLs and increased motion.    Prior treatment  - outpatient PT Previous therapy for his hip replacement, but no previous treatment for his neck  - Discharged from hospital, home health, or skilled nursing facility in last 30 days: no  Home Environment   - Patient lives with: children and significant other  - Type of home: single level home  - Assistance available: no assist  - Denies 2 or more falls or an unexplained fall with injury in the last year.  - Feel safe at home / work / school: yes      OBJECTIVE                                                                                                                     Range of Motion (ROM)   (degrees unless noted; active unless noted; norms in ( ); negative=lacking to 0, positive=beyond 0)  Cervical:    - Flexion (45-50): 50 (stiffness in neck)%    - Extension (45-60): WFL    - Rotation (60-80):        • Left: 75 (stiff on right side of neck)%        • Right: 80 (stiff on right side of neck)%    - Side Bend (45):        • Left: 75 (ache on the R side)%        • Right: 50 (ache and stiff on the L side)%         Palpation  Left  - Cervical Paraspinals: tenderness  - Sub Occipitals: tenderness  - Anterior Scalenes: no palpable tenderness  Right  - Cervical Paraspinals: tenderness  - Sub Occipitals: tenderness  - Anterior Scalenes: no palpable tenderness  Cervical Spine  - C4-C5:  - Right: tenderness  - C5-C6:  - Right: tenderness  Joint Play   Cervical Spine    - C4-C5:  • Right hypomobile  - C5-C6:  • Right hypomobile              Outcome/Assessments  Outcome Measures:   Neck Disability Index (NDI): Neck Disability Index Score: 21  NDI Total Possible Score: 50  NDI Score Calculated: 42 %  (scored 0-100, higher score indicates higher disability) see flowsheet for additional documentation        Treatment     Therapeutic Exercise  Seated Thoracic Rotation Stretch: 2x10 (each)  Cervical Retraction in Sitting: x15  HEP, POC, anatomy, seated posture    Manual Therapy   Cervical Distraction  Joint Mobilization: Right C4-C5 UPA  Soft Tissue Massage: Bilateral Upper Trapezius     Skilled input: verbal instruction/cues    Writer verbally educated and received verbal consent for hand placement, positioning of patient, and techniques to be performed today from patient     Home Exercise Program  Access Code: [email or web address]  URL: [email or web address]  Date: 05/10/2023  Prepared by: Elicia Ashley    Exercises  - seated thoracic rotation  - 1 x daily - 7 x weekly - 2 sets - 10 reps  - Seated Cervical Retraction  - 1 x daily - 7 x weekly - 2 sets - 10 reps - 3 hold      ASSESSMENT                                                                                                          49 year old patient has reported functional limitations listed above impacted by signs and symptoms consistent with treatment diagnosis below.  Treatment Diagnosis:   - Involved: cervical.  - Symptoms/impairments: impaired coordination, impaired posture, headaches, increased pain/symptoms, impaired range of motion, impaired joint play/mobility and impaired tissue mobility.    Mr. Bates presents to physical therapy today with complaints of neck pain with referral down his right arm and associated headaches. Patient has signs and symptoms consistent with cervical facet stiffness on the right side as evidenced by decreased active range of motion, increased muscle tension, decreased joint  copd mobility, and pain referral patterns. He will benefit from skilled physical therapy addressing his above listed limitations and facilitating full return to previous level of function.    Prognosis: Patient will benefit from skilled therapy.  Rehabilitative potential is: good.  Predicted patient presentation: Low (stable) - Patient comorbidities and complexities, as defined above, will have little effect on progress for prescribed plan of care.  Education:   - Present and ready to learn: patient  - Results of above outlined education: Verbalizes understanding, Demonstrates understanding and Needs reinforcement    PLAN                                                                                                                         The following skilled interventions to be implemented to achieve goals listed below:  Activities of Daily Living/Self Care (01887)  Neuromuscular Re-Education (32125)  Therapeutic Exercise (65983)  Heat/Cold (04112)  Manual Therapy (74082)  Therapeutic Activity (98813)  Electrical Stimulation Unattended (55754 or )    Frequency / Duration  1 times per week tapering as patient progresses for 6 weeks for an estimated total of 6 visits    Patient involved in and agreed to plan of care and goals.  Attendance policy reviewed with patient.    Suggestions for next session as indicated: Progress per plan of care  Cervical endurance training in supine and sitting      Goals  Long Term Goals: to be met by end of plan of care  1. Patient will have full active range of motion of his neck in order to look in all positions while driving without increases in neck pain.  2. Patient will be able to sleep through the night without increases in neck pain.  3. Patient will be able to return to lifting weights in the gym without increases in neck pain or headaches.  4. Neck Disability Index: Patient will complete form to reflect an improved score to less than or equal to 21% to indicate patient  reported improvement in function/disability/impairment (minimal detectable change: 21%).  5. Patient will be independent with progressed and modified home exercise program.      Therapy procedure time and total treatment time can be found documented on the Time Entry flowsheet     GRICELDA Maya MD

## 2025-01-21 NOTE — ED ADULT TRIAGE NOTE - ESI TRIAGE ACUITY LEVEL, MLM
2
Pt in OBSERVATION for CP Protocol  todays plan is echo and serial trops  cardiology has seen pt and made recommendations  agree w care plan
(4) no limitation